# Patient Record
Sex: FEMALE | Race: WHITE | NOT HISPANIC OR LATINO | Employment: STUDENT | URBAN - METROPOLITAN AREA
[De-identification: names, ages, dates, MRNs, and addresses within clinical notes are randomized per-mention and may not be internally consistent; named-entity substitution may affect disease eponyms.]

---
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
## 2019-01-03 ENCOUNTER — HOSPITAL ENCOUNTER (EMERGENCY)
Facility: HOSPITAL | Age: 19
Discharge: HOME | End: 2019-01-04
Attending: EMERGENCY MEDICINE
Payer: COMMERCIAL

## 2019-01-03 ENCOUNTER — APPOINTMENT (EMERGENCY)
Dept: CT IMAGING | Facility: HOSPITAL | Age: 19
End: 2019-01-03
Payer: COMMERCIAL

## 2019-01-03 ENCOUNTER — APPOINTMENT (EMERGENCY)
Dept: RADIOLOGY | Facility: HOSPITAL | Age: 19
End: 2019-01-03
Payer: COMMERCIAL

## 2019-01-03 DIAGNOSIS — K31.84 GASTROPARESIS: Primary | ICD-10-CM

## 2019-01-03 LAB
ALBUMIN SERPL BCP-MCNC: 4 G/DL (ref 3.5–5)
ALP SERPL-CCNC: 80 U/L (ref 46–384)
ALT SERPL W P-5'-P-CCNC: 21 U/L (ref 12–78)
ANION GAP SERPL CALCULATED.3IONS-SCNC: 11 MMOL/L (ref 4–13)
AST SERPL W P-5'-P-CCNC: 13 U/L (ref 5–45)
BASOPHILS # BLD AUTO: 0.05 THOUSANDS/ΜL (ref 0–0.1)
BASOPHILS NFR BLD AUTO: 1 % (ref 0–1)
BILIRUB SERPL-MCNC: 0.2 MG/DL (ref 0.2–1)
BILIRUB UR QL STRIP: NEGATIVE
BUN SERPL-MCNC: 10 MG/DL (ref 5–25)
CALCIUM SERPL-MCNC: 9.6 MG/DL (ref 8.3–10.1)
CHLORIDE SERPL-SCNC: 104 MMOL/L (ref 100–108)
CLARITY UR: CLEAR
CO2 SERPL-SCNC: 25 MMOL/L (ref 21–32)
COLOR UR: YELLOW
CREAT SERPL-MCNC: 0.88 MG/DL (ref 0.6–1.3)
EOSINOPHIL # BLD AUTO: 0.04 THOUSAND/ΜL (ref 0–0.61)
EOSINOPHIL NFR BLD AUTO: 0 % (ref 0–6)
ERYTHROCYTE [DISTWIDTH] IN BLOOD BY AUTOMATED COUNT: 11.8 % (ref 11.6–15.1)
EXT PREG TEST URINE: NEGATIVE
GFR SERPL CREATININE-BSD FRML MDRD: 96 ML/MIN/1.73SQ M
GLUCOSE SERPL-MCNC: 103 MG/DL (ref 65–140)
GLUCOSE UR STRIP-MCNC: NEGATIVE MG/DL
HCT VFR BLD AUTO: 43 % (ref 34.8–46.1)
HGB BLD-MCNC: 14.5 G/DL (ref 11.5–15.4)
HGB UR QL STRIP.AUTO: NEGATIVE
IMM GRANULOCYTES # BLD AUTO: 0.02 THOUSAND/UL (ref 0–0.2)
IMM GRANULOCYTES NFR BLD AUTO: 0 % (ref 0–2)
KETONES UR STRIP-MCNC: NEGATIVE MG/DL
LEUKOCYTE ESTERASE UR QL STRIP: NEGATIVE
LIPASE SERPL-CCNC: 220 U/L (ref 73–393)
LYMPHOCYTES # BLD AUTO: 2.79 THOUSANDS/ΜL (ref 0.6–4.47)
LYMPHOCYTES NFR BLD AUTO: 31 % (ref 14–44)
MCH RBC QN AUTO: 28.7 PG (ref 26.8–34.3)
MCHC RBC AUTO-ENTMCNC: 33.7 G/DL (ref 31.4–37.4)
MCV RBC AUTO: 85 FL (ref 82–98)
MONOCYTES # BLD AUTO: 0.53 THOUSAND/ΜL (ref 0.17–1.22)
MONOCYTES NFR BLD AUTO: 6 % (ref 4–12)
NEUTROPHILS # BLD AUTO: 5.71 THOUSANDS/ΜL (ref 1.85–7.62)
NEUTS SEG NFR BLD AUTO: 62 % (ref 43–75)
NITRITE UR QL STRIP: NEGATIVE
NRBC BLD AUTO-RTO: 0 /100 WBCS
PH UR STRIP.AUTO: 7.5 [PH] (ref 4.5–8)
PLATELET # BLD AUTO: 286 THOUSANDS/UL (ref 149–390)
PMV BLD AUTO: 9.5 FL (ref 8.9–12.7)
POTASSIUM SERPL-SCNC: 3.4 MMOL/L (ref 3.5–5.3)
PROT SERPL-MCNC: 8.3 G/DL (ref 6.4–8.2)
PROT UR STRIP-MCNC: NEGATIVE MG/DL
RBC # BLD AUTO: 5.06 MILLION/UL (ref 3.81–5.12)
SODIUM SERPL-SCNC: 140 MMOL/L (ref 136–145)
SP GR UR STRIP.AUTO: 1.02 (ref 1–1.03)
UROBILINOGEN UR QL STRIP.AUTO: 0.2 E.U./DL
WBC # BLD AUTO: 9.14 THOUSAND/UL (ref 4.31–10.16)

## 2019-01-03 PROCEDURE — 81025 URINE PREGNANCY TEST: CPT | Performed by: PHYSICIAN ASSISTANT

## 2019-01-03 PROCEDURE — 80053 COMPREHEN METABOLIC PANEL: CPT | Performed by: PHYSICIAN ASSISTANT

## 2019-01-03 PROCEDURE — 71046 X-RAY EXAM CHEST 2 VIEWS: CPT

## 2019-01-03 PROCEDURE — 81003 URINALYSIS AUTO W/O SCOPE: CPT

## 2019-01-03 PROCEDURE — 85025 COMPLETE CBC W/AUTO DIFF WBC: CPT | Performed by: PHYSICIAN ASSISTANT

## 2019-01-03 PROCEDURE — 93005 ELECTROCARDIOGRAM TRACING: CPT

## 2019-01-03 PROCEDURE — 99285 EMERGENCY DEPT VISIT HI MDM: CPT

## 2019-01-03 PROCEDURE — 96375 TX/PRO/DX INJ NEW DRUG ADDON: CPT

## 2019-01-03 PROCEDURE — 74176 CT ABD & PELVIS W/O CONTRAST: CPT

## 2019-01-03 PROCEDURE — 83690 ASSAY OF LIPASE: CPT | Performed by: PHYSICIAN ASSISTANT

## 2019-01-03 PROCEDURE — 36415 COLL VENOUS BLD VENIPUNCTURE: CPT | Performed by: PHYSICIAN ASSISTANT

## 2019-01-03 PROCEDURE — 96374 THER/PROPH/DIAG INJ IV PUSH: CPT

## 2019-01-03 RX ORDER — ONDANSETRON 2 MG/ML
4 INJECTION INTRAMUSCULAR; INTRAVENOUS ONCE
Status: COMPLETED | OUTPATIENT
Start: 2019-01-03 | End: 2019-01-03

## 2019-01-03 RX ORDER — KETOROLAC TROMETHAMINE 30 MG/ML
30 INJECTION, SOLUTION INTRAMUSCULAR; INTRAVENOUS ONCE
Status: COMPLETED | OUTPATIENT
Start: 2019-01-03 | End: 2019-01-03

## 2019-01-03 RX ADMIN — KETOROLAC TROMETHAMINE 30 MG: 30 INJECTION, SOLUTION INTRAMUSCULAR at 23:18

## 2019-01-03 RX ADMIN — ONDANSETRON 4 MG: 2 INJECTION INTRAMUSCULAR; INTRAVENOUS at 23:17

## 2019-01-04 ENCOUNTER — TELEPHONE (OUTPATIENT)
Dept: ADMINISTRATIVE | Facility: OTHER | Age: 19
End: 2019-01-04

## 2019-01-04 ENCOUNTER — TELEPHONE (OUTPATIENT)
Dept: GASTROENTEROLOGY | Facility: CLINIC | Age: 19
End: 2019-01-04

## 2019-01-04 VITALS
OXYGEN SATURATION: 98 % | RESPIRATION RATE: 20 BRPM | SYSTOLIC BLOOD PRESSURE: 128 MMHG | TEMPERATURE: 98.6 F | HEART RATE: 84 BPM | DIASTOLIC BLOOD PRESSURE: 83 MMHG

## 2019-01-04 LAB
ATRIAL RATE: 79 BPM
P AXIS: 49 DEGREES
PR INTERVAL: 138 MS
QRS AXIS: 41 DEGREES
QRSD INTERVAL: 92 MS
QT INTERVAL: 364 MS
QTC INTERVAL: 417 MS
T WAVE AXIS: 46 DEGREES
VENTRICULAR RATE: 79 BPM

## 2019-01-04 PROCEDURE — 93010 ELECTROCARDIOGRAM REPORT: CPT | Performed by: INTERNAL MEDICINE

## 2019-01-04 RX ORDER — FAMOTIDINE 20 MG/1
20 TABLET, FILM COATED ORAL ONCE
Status: COMPLETED | OUTPATIENT
Start: 2019-01-04 | End: 2019-01-04

## 2019-01-04 RX ORDER — SUCRALFATE 1 G/1
1 TABLET ORAL 3 TIMES DAILY
Qty: 9 TABLET | Refills: 0 | Status: SHIPPED | OUTPATIENT
Start: 2019-01-04 | End: 2019-01-09 | Stop reason: ALTCHOICE

## 2019-01-04 RX ORDER — OMEPRAZOLE 20 MG/1
20 CAPSULE, DELAYED RELEASE ORAL DAILY
Qty: 10 CAPSULE | Refills: 0 | Status: SHIPPED | OUTPATIENT
Start: 2019-01-04 | End: 2019-01-09 | Stop reason: DRUGHIGH

## 2019-01-04 RX ORDER — SUCRALFATE ORAL 1 G/10ML
1000 SUSPENSION ORAL ONCE
Status: COMPLETED | OUTPATIENT
Start: 2019-01-04 | End: 2019-01-04

## 2019-01-04 RX ORDER — MAGNESIUM HYDROXIDE/ALUMINUM HYDROXICE/SIMETHICONE 120; 1200; 1200 MG/30ML; MG/30ML; MG/30ML
30 SUSPENSION ORAL ONCE
Status: COMPLETED | OUTPATIENT
Start: 2019-01-04 | End: 2019-01-04

## 2019-01-04 RX ADMIN — SUCRALFATE 1000 MG: 1 SUSPENSION ORAL at 01:03

## 2019-01-04 RX ADMIN — LIDOCAINE HYDROCHLORIDE 15 ML: 20 SOLUTION ORAL; TOPICAL at 00:38

## 2019-01-04 RX ADMIN — ALUMINUM HYDROXIDE, MAGNESIUM HYDROXIDE, AND SIMETHICONE 30 ML: 200; 200; 20 SUSPENSION ORAL at 00:38

## 2019-01-04 RX ADMIN — FAMOTIDINE 20 MG: 20 TABLET ORAL at 00:37

## 2019-01-04 NOTE — TELEPHONE ENCOUNTER
Mom called stating pt was in the ER yesterday evening they diagnosed her with Gastroparesis/Gastritis, mom would like to know if you can give her a referral to a GI doctor in [place], [place] while the patient is away at school  She leaves again tomorrow morning for the second semester   Thank you

## 2019-01-04 NOTE — TELEPHONE ENCOUNTER
PT'S DAD CALLED AND WANTED TO FOLLOW UP WITH REQUEST FOR REFERRAL THAT MOM ASKED FOR EARLIER  DAD STATES HE WOULD LIKE TO KNOW A SPECIFIC DOC'S NAME FOR HIS DAUGHTER TO BE SEEN, NOT NECESSARILY A REFERRAL      [phone]

## 2019-01-04 NOTE — TELEPHONE ENCOUNTER
I do not know the adult docs at [facility] at [address]  would be a good person as a resource but I do not know if he would see a 21 yo as new pt  There ar emultiple good docs in that group

## 2019-01-04 NOTE — DISCHARGE INSTRUCTIONS
Gastroparesis, Ambulatory Care   GENERAL INFORMATION:   Gastroparesis  is a condition that causes food to move more slowly than normal from the stomach to the intestines  Gastroparesis is not caused by blockage  Often, the cause may not be known  It may be caused by damage to the vagus nerve  The vagus nerve controls the abigail and relaxing of the stomach and pyloric muscles  These muscles move food through to your small intestines  Common symptoms include:   · Nausea and vomiting    · Feeling full sooner than normal or after eating less than usual    · Abdominal bloating and pain  Seek immediate care for the following symptoms:   · Confusion or trouble thinking clearly    · Faster than usual heartbeat and breathing    · Unable to be awakened  Treatment for gastroparesis  may include medicine to control your nausea and vomiting  You may also need medicines that help food move through your stomach at a more normal rate  Nutrition support may be given as liquid supplements  You may need to eat soft foods, or a tube feeding may be placed past your stomach into your intestine  You may need to see a dietitian for help with a nutrition plan  Gastric electrical stimulation (GES) may be done when symptoms cannot be controlled by other treatments  GES sends electrical pulses to the nerves in your stomach to help food move through to your intestines  It may also help control nausea and vomiting  Manage gastroparesis:  Your healthcare provider may suggest any of the following:  · Chew your food well  Smaller bites of food are easier for your body to digest  You may need to eat several, small, low-fat, low-fiber meals throughout the day  · Avoid raw fruits, vegetables, and whole grains  These can cause you to have undigested food in your stomach  The undigested food can form into a blockage that can become life-threatening  · Drink liquids as directed    Ask your healthcare provider how much liquid to drink each day, and which liquids are best for you  You may also need to drink an oral rehydration solution (ORS)  An ORS has the right amounts of sugar, salt, and minerals in water to replace body fluids  · Do not lie down for 2 hours after your meals  Walking and sitting after meals help with digestion  · Control your blood sugar levels if you have diabetes  High blood sugar levels may make your symptoms worse  Ask your healthcare provider how to control your blood sugar levels  Follow up with your healthcare provider as directed: You may need tests to check if treatment is working  You may need to see a dietitian for help with a nutrition plan  Write down your questions so you remember to ask them during your visits  CARE AGREEMENT:   You have the right to help plan your care  Learn about your health condition and how it may be treated  Discuss treatment options with your caregivers to decide what care you want to receive  You always have the right to refuse treatment  The above information is an  only  It is not intended as medical advice for individual conditions or treatments  Talk to your doctor, nurse or pharmacist before following any medical regimen to see if it is safe and effective for you  © 2014 [address] is for End User's use only and may not be sold, redistributed or otherwise used for commercial purposes  All illustrations and images included in CareNotes® are the copyrighted property of A D A M , Inc  or Abdiaziz Younger

## 2019-01-04 NOTE — ED PROVIDER NOTES
History  Chief Complaint   Patient presents with    Abdominal Pain     pt reports hx gastroparesis and having abdominal pain starting about 7 hours PTA, pt also reports intermittent SOB/CP that has been ongoing "for months", pt reports all symtoms are worse today than before, no meds PTA     23 y/o F presents to ED due to Abdominal Pain  Pt reports Abdominal pain began 7 hours PTA, states this is located on her L side and describes it as dull, with occasional sharp pains  Pt reports no association with food, position, activity, defecation, urination  Pt reports associated nausea, though denies vomiting and states she has been able to eat/drink  Pt also c/o CP, SOB intermittently that has been ongoing for months, states she was previously seen for this issue and was told she had costochondritis, denies any cough, f/c/s, LE swelling, palpitations, lightheadedness, near-syncope, and no specific aggravating/alleviating factors--this pain just resolves on own  Pt reports history of Gastroparesis, states that she has GI, last seen 1 year ago with eval and removal of medications including omeprazole  Pt reports she is sexually active with male partner, states she uses protection, is on BC pills, denies pelvic pain, vaginal bleeding, discharge, pruritis, rashes, flank pain  Patient's last menstrual period was 12/26/2018  Denies new meds, prior abdominal surgery, sick contacts, suspicious food intake, excessive alcohol use, dietary changes, f/c/s, and no other complaints at this time              History provided by:  Patient and parent   used: No    Abdominal Pain   Pain location:  LUQ  Pain quality: dull and sharp    Pain radiates to:  Does not radiate  Pain severity:  Mild  Onset quality:  Gradual  Timing:  Constant  Progression:  Waxing and waning  Chronicity:  New  Context: not alcohol use, not awakening from sleep, not diet changes, not eating, not laxative use, not medication withdrawal, not previous surgeries, not recent illness, not recent sexual activity, not recent travel, not retching, not sick contacts, not suspicious food intake and not trauma    Relieved by:  None tried  Worsened by:  Nothing  Ineffective treatments:  None tried  Associated symptoms: chest pain and nausea    Associated symptoms: no anorexia, no belching, no chills, no constipation, no cough, no diarrhea, no dysuria, no fatigue, no fever, no flatus, no hematemesis, no hematochezia, no hematuria, no melena, no shortness of breath, no sore throat, no vaginal bleeding, no vaginal discharge and no vomiting    Chest pain:     Quality: sharp      Severity:  Mild    Onset quality:  Sudden    Timing:  Intermittent    Progression:  Waxing and waning    Chronicity:  New  Nausea:     Severity:  Mild    Onset quality:  Gradual    Timing:  Constant    Progression:  Unchanged  Risk factors: no alcohol abuse, no aspirin use, not elderly, has not had multiple surgeries, no NSAID use, not obese, not pregnant and no recent hospitalization        Prior to Admission Medications   Prescriptions Last Dose Informant Patient Reported? Taking?    LORazepam (ATIVAN) 1 mg tablet   No Yes   Sig: Take 1 tablet (1 mg total) by mouth daily as needed for anxiety   MARLISSA 0 15-30 MG-MCG per tablet Unknown at Unknown time  No No   Sig: TAKE 1 TABLET BY MOUTH DAILY   albuterol (PROAIR HFA) 90 mcg/act inhaler  Self Yes Yes   Sig: Inhale   levonorgestrel-ethinyl estradiol (NORDETTE) 0 15-30 MG-MCG per tablet  Self No Yes   Sig: Take 1 tablet by mouth daily   sertraline (ZOLOFT) 50 mg tablet   No Yes   Sig: Take half tablet daily for 1 week, then take full tablet daily      Facility-Administered Medications: None       Past Medical History:   Diagnosis Date    Anemia     Anxiety     Depression     Exertional shortness of breath     Resolved 12/23/2015     Gastritis     Gastroparesis     Resolved 10/21/2016     Lyme disease     Syncope     Resolved 11/18/2015 Past Surgical History:   Procedure Laterality Date    ESOPHAGOGASTRODUODENOSCOPY      EGD with bipsy       Family History   Problem Relation Age of Onset    Anxiety disorder Mother     Bipolar disorder Father     Mental illness Father     Anxiety disorder Maternal Grandmother     Hyperlipidemia Maternal Grandmother     Hypertension Maternal Grandmother     Thyroid disease Maternal Grandmother     Schizophrenia Paternal Grandmother     Lung cancer Maternal Grandfather     Kidney disease Paternal Grandfather     Prostate cancer Paternal Grandfather     Substance Abuse Neg Hx      I have reviewed and agree with the history as documented  Social History   Substance Use Topics    Smoking status: Never Smoker    Smokeless tobacco: Never Used    Alcohol use No        Review of Systems   Constitutional: Negative for activity change, appetite change, chills, diaphoresis, fatigue and fever  HENT: Negative for sore throat  Respiratory: Negative for apnea, cough, choking, chest tightness, shortness of breath, wheezing and stridor  Cardiovascular: Positive for chest pain  Negative for palpitations and leg swelling  Gastrointestinal: Positive for abdominal pain and nausea  Negative for anorexia, constipation, diarrhea, flatus, hematemesis, hematochezia, melena and vomiting  Genitourinary: Negative for decreased urine volume, difficulty urinating, dyspareunia, dysuria, flank pain, hematuria, menstrual problem, vaginal bleeding, vaginal discharge and vaginal pain  Musculoskeletal: Negative for arthralgias, joint swelling and myalgias  Skin: Negative for rash and wound  Neurological: Negative for dizziness, light-headedness, numbness and headaches  Physical Exam  Physical Exam   Constitutional: She is oriented to person, place, and time  She appears well-developed and well-nourished  No distress  HENT:   Head: Normocephalic and atraumatic     Right Ear: External ear normal    Left Ear: External ear normal    Mouth/Throat: Oropharynx is clear and moist  No oropharyngeal exudate  Eyes: Pupils are equal, round, and reactive to light  Neck: Normal range of motion  Neck supple  No JVD present  Cardiovascular: Normal rate, regular rhythm, normal heart sounds and intact distal pulses  Exam reveals no gallop and no friction rub  No murmur heard  Pulmonary/Chest: Effort normal and breath sounds normal  No stridor  No respiratory distress  She has no wheezes  She has no rales  She exhibits tenderness (over costochondral junction)  Abdominal: Soft  Bowel sounds are normal  She exhibits no mass  There is no hepatosplenomegaly  There is tenderness in the epigastric area and left upper quadrant  There is no rigidity, no rebound, no guarding, no CVA tenderness, no tenderness at McBurney's point and negative Sandoval's sign  Hernia confirmed negative in the ventral area  Musculoskeletal: Normal range of motion  Lymphadenopathy:     She has no cervical adenopathy  Neurological: She is alert and oriented to person, place, and time  Skin: Skin is warm  Capillary refill takes less than 2 seconds  Nursing note and vitals reviewed        Vital Signs  ED Triage Vitals [01/03/19 2019]   Temperature Pulse Respirations Blood Pressure SpO2   98 6 °F (37 °C) 95 18 140/68 98 %      Temp Source Heart Rate Source Patient Position - Orthostatic VS BP Location FiO2 (%)   Oral Monitor Sitting Left arm --      Pain Score       7           Vitals:    01/04/19 0015 01/04/19 0030 01/04/19 0041 01/04/19 0045   BP:   128/83 128/83   Pulse: 88 88 82 84   Patient Position - Orthostatic VS:   Lying        Visual Acuity      ED Medications  Medications   ketorolac (TORADOL) injection 30 mg (30 mg Intravenous Given 1/3/19 2318)   ondansetron (ZOFRAN) injection 4 mg (4 mg Intravenous Given 1/3/19 2317)   lidocaine viscous (XYLOCAINE) 2 % mucosal solution 15 mL (15 mL Swish & Spit Given 1/4/19 0038) aluminum-magnesium hydroxide-simethicone (MYLANTA) 200-200-20 mg/5 mL oral suspension 30 mL (30 mL Oral Given 1/4/19 0038)   famotidine (PEPCID) tablet 20 mg (20 mg Oral Given 1/4/19 0037)   sucralfate (CARAFATE) oral suspension 1,000 mg (1,000 mg Oral Given 1/4/19 0103)       Diagnostic Studies  Results Reviewed     Procedure Component Value Units Date/Time    CMP [[id number]]  (Abnormal) Collected:  01/03/19 2316    Lab Status:  Final result Specimen:  Blood from Arm, Left Updated:  01/03/19 2344     Sodium 140 mmol/L      Potassium 3 4 (L) mmol/L      Chloride 104 mmol/L      CO2 25 mmol/L      ANION GAP 11 mmol/L      BUN 10 mg/dL      Creatinine 0 88 mg/dL      Glucose 103 mg/dL      Calcium 9 6 mg/dL      AST 13 U/L      ALT 21 U/L      Alkaline Phosphatase 80 U/L      Total Protein 8 3 (H) g/dL      Albumin 4 0 g/dL      Total Bilirubin 0 20 mg/dL      eGFR 96 ml/min/1 73sq m     Narrative:         National Kidney Disease Education Program recommendations are as follows:  GFR calculation is accurate only with a steady state creatinine  Chronic Kidney disease less than 60 ml/min/1 73 sq  meters  Kidney failure less than 15 ml/min/1 73 sq  meters      Lipase [[id number]]  (Normal) Collected:  01/03/19 2316    Lab Status:  Final result Specimen:  Blood from Arm, Left Updated:  01/03/19 2344     Lipase 220 u/L     CBC and differential [[id number]] Collected:  01/03/19 2316    Lab Status:  Final result Specimen:  Blood from Arm, Left Updated:  01/03/19 2327     WBC 9 14 Thousand/uL      RBC 5 06 Million/uL      Hemoglobin 14 5 g/dL      Hematocrit 43 0 %      MCV 85 fL      MCH 28 7 pg      MCHC 33 7 g/dL      RDW 11 8 %      MPV 9 5 fL      Platelets 894 Thousands/uL      nRBC 0 /100 WBCs      Neutrophils Relative 62 %      Immat GRANS % 0 %      Lymphocytes Relative 31 %      Monocytes Relative 6 %      Eosinophils Relative 0 %      Basophils Relative 1 %      Neutrophils Absolute 5 71 Thousands/µL      Immature Grans Absolute 0 02 Thousand/uL      Lymphocytes Absolute 2 79 Thousands/µL      Monocytes Absolute 0 53 Thousand/µL      Eosinophils Absolute 0 04 Thousand/µL      Basophils Absolute 0 05 Thousands/µL     POCT pregnancy, urine [[id number]]  (Normal) Resulted:  01/03/19 2315    Lab Status:  Final result Specimen:  Urine Updated:  01/03/19 2315     EXT PREG TEST UR (Ref: Negative) Negative    ED Urine Macroscopic [[id number]] Collected:  01/03/19 2304    Lab Status:  Final result Specimen:  Urine Updated:  01/03/19 2304     Color, UA Yellow     Clarity, UA Clear     pH, UA 7 5     Leukocytes, UA Negative     Nitrite, UA Negative     Protein, UA Negative mg/dl      Glucose, UA Negative mg/dl      Ketones, UA Negative mg/dl      Urobilinogen, UA 0 2 E U /dl      Bilirubin, UA Negative     Blood, UA Negative     Specific Gravity, UA 1 020    Narrative:       CLIN[facility] RESULT                 XR chest 2 views   Final Result by Dameon Day MD (01/04 9744)      No acute cardiopulmonary disease  Workstation performed: [id number]         CT abdomen pelvis wo contrast   Final Result by Adry Vasquez DO (01/04 0001)      No acute findings               Workstation performed: [id number]                    Procedures  Procedures       Phone Contacts  ED Phone Contact    ED Course  ED Course as of Jan 06 0247   Thu Jan 03, 2019   2353 CT abdomen pelvis wo contrast                               MDM  Number of Diagnoses or Management Options  Gastroparesis: new and requires workup  Diagnosis management comments: 23 y/o F presents to ED due to Abdominal Pain  Pt reports Abdominal pain began 7 hours PTA, states this is located on her L side and describes it as dull, with occasional sharp pains  Pt reports no association with food, position, activity, defecation, urination  Pt reports associated nausea, though denies vomiting and states she has been able to eat/drink   Pt also c/o CP, SOB intermittently that has been ongoing for months, states she was previously seen for this issue and was told she had costochondritis, denies any cough, f/c/s, LE swelling, palpitations, lightheadedness, near-syncope, and no specific aggravating/alleviating factors--this pain just resolves on own  Pt reports history of Gastroparesis, states that she has GI, last seen 1 year ago with eval and removal of medications including omeprazole  Pt reports she is sexually active with male partner, states she uses protection, is on BC pills, denies pelvic pain, vaginal bleeding, discharge, pruritis, rashes, flank pain  Patient's last menstrual period was 12/26/2018  Denies new meds, prior abdominal surgery, sick contacts, suspicious food intake, excessive alcohol use, dietary changes, f/c/s, and no other complaints at this time  VS reviewed and WNL  Exam reveals TTP over costochondral junction, TTP over the LUQ, epigastric region without rebound, guarding, negative reed's negative peritoneal signs and no other significant findings  Will give IVF+analgesia, antiemetic, check labs, CT abd to r/o acute process  Reassess  Labs, CT without significant findings  Discussed course with pt, will attempt GI cocktail, Reassess  Pt improved with GI cocktail, will send home with scripts (recommendations to take Carafate >45 min after PPI) and recommend f/u GI specialist--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 [place], recommended gathering pt records if unable to f/u before return to school and establish care nearby school  Pt sts she understands and agrees with plan, return to ED instructions given regarding new/worsening sxs           Amount and/or Complexity of Data Reviewed  Tests in the radiology section of CPT®: reviewed and ordered  Tests in the medicine section of CPT®: reviewed and ordered  Review and summarize past medical records: yes    Risk of Complications, Morbidity, and/or Mortality  Presenting problems: moderate  Diagnostic procedures: moderate  Management options: moderate    Patient Progress  Patient progress: improved    CritCare Time    Disposition  Final diagnoses:   Gastroparesis     Time reflects when diagnosis was documented in both MDM as applicable and the Disposition within this note     Time User Action Codes Description Comment    1/4/2019 12:52 AM Bennett Diez Add [K31 [id number]] Gastroparesis       ED Disposition     ED Disposition Condition Comment    Discharge  Earnestine Underwood discharge to home/self care  Condition at discharge: Good        Follow-up Information     Follow up With Specialties Details Why Contact Info Additional Information    Yaron Medina MD Family Medicine  As needed [address]       [address] Emergency Department Emergency Medicine  If symptoms worsen [address] [address]  [phone] [facility],  [address], [place], [place], [address]    Your GI Specialist  Schedule an appointment as soon as possible for a visit             Discharge Medication List as of 1/4/2019  1:02 AM      START taking these medications    Details   omeprazole (PriLOSEC) 20 mg delayed release capsule Take 1 capsule (20 mg total) by mouth daily for 10 days, Starting Fri 1/4/2019, Until Mon 1/14/2019, Print      sucralfate (CARAFATE) 1 g tablet Take 1 tablet (1 g total) by mouth 3 (three) times a day for 3 days, Starting Fri 1/4/2019, Until Mon 1/7/2019, Print         CONTINUE these medications which have NOT CHANGED    Details   albuterol (PROAIR HFA) 90 mcg/act inhaler Inhale, Starting Thu 10/15/2015, Historical Med      !! levonorgestrel-ethinyl estradiol (NORDETTE) 0 15-30 MG-MCG per tablet Take 1 tablet by mouth daily, Starting Fri 10/5/2018, Normal      LORazepam (ATIVAN) 1 mg tablet Take 1 tablet (1 mg total) by mouth daily as needed for anxiety, Starting Fri 12/14/2018, Normal      !!  MARLISSA 0 15-30 MG-MCG per tablet TAKE 1 TABLET BY MOUTH DAILY, Starting Thu 12/13/2018, Normal      sertraline (ZOLOFT) 50 mg tablet Take half tablet daily for 1 week, then take full tablet daily, Normal       !! - Potential duplicate medications found  Please discuss with provider  No discharge procedures on file      ED Provider  Electronically Signed by           Isatu Lopez PA-C  01/06/19 6140

## 2019-01-04 NOTE — ED NOTES
Patient resting at bedside with mother   No distress or new complaints noted       [id number] [facility] 28, RN  01/03/19 4596

## 2019-01-04 NOTE — TELEPHONE ENCOUNTER
I know some Pediatric people but they are not likely to see a new pt who is 18  I would suggest she see one of the physicians at the [facility] in [place]

## 2019-01-04 NOTE — TELEPHONE ENCOUNTER
Patient called back and her mom called [facility] gastro to get a referral for a gastro in [place] where her daughter goes to school  Patient returning to [place] tomorrow

## 2019-01-07 ENCOUNTER — TELEPHONE (OUTPATIENT)
Dept: BEHAVIORAL/MENTAL HEALTH CLINIC | Facility: CLINIC | Age: 19
End: 2019-01-07

## 2019-01-07 NOTE — TELEPHONE ENCOUNTER
Kin Aviles,    Patient's father called wanting to get Magdalene Renteria into see Dr Janis robb  I explained he is not in this week and will be back next week  He says that Magdalene Renteria had a crisis over the weekend due to her stomach issues (you can read what is charted at last visit ) She then refused to go back to school which is in [place] and missed her flight  She now says she made a mistake and would like to go back to school  Mother and Father would like for her to be seen sometime this week before she goes back  Would you be willing to see this patient in Dr Pavan Mcallister absence?

## 2019-01-07 NOTE — TELEPHONE ENCOUNTER
Yes, do you think she can come in today? Preferably in the morning, before noon? Please let me know! Thank you so much!

## 2019-01-08 ENCOUNTER — TELEPHONE (OUTPATIENT)
Dept: PSYCHIATRY | Facility: CLINIC | Age: 19
End: 2019-01-08

## 2019-01-08 ENCOUNTER — OFFICE VISIT (OUTPATIENT)
Dept: PSYCHIATRY | Facility: CLINIC | Age: 19
End: 2019-01-08
Payer: COMMERCIAL

## 2019-01-08 DIAGNOSIS — F32.A DEPRESSIVE DISORDER: ICD-10-CM

## 2019-01-08 DIAGNOSIS — F41.9 ANXIETY DISORDER, UNSPECIFIED TYPE: Primary | ICD-10-CM

## 2019-01-08 PROCEDURE — 3725F SCREEN DEPRESSION PERFORMED: CPT | Performed by: PHYSICIAN ASSISTANT

## 2019-01-08 PROCEDURE — 99214 OFFICE O/P EST MOD 30 MIN: CPT | Performed by: PHYSICIAN ASSISTANT

## 2019-01-08 NOTE — PSYCH
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), no significant PPH, no past psychiatric hospitalizations, no past suicide attempts, no h/o self-injurious behaviors, no h/o physical altercations, PMH significant for h/o lyme disease, gastritis, h/o esophageal candidiasis, no active substance use, presents to [place] [address] outpatient clinic on referral from gastroenterologist for psychosomatic contributions to chronic abdominal pain with patient reporting "I need help controlling the pain" with father reporting "she's had stress and anxiety "     On problem-focused interview:  1  Somatic Symptoms- Patient reports that she was in the Emergency Room last week due to stomach pain  She was started on a trial of Prilosec, and is following up with GI tomorrow       2  Mood/Anxiety- Patient reports that life has been "really stressful " She states that last week, she had a severe flare of her gastroparesis, and she went to the Emergency Room  This recent stomach flare "almost completely ruined" her desire to return back to college in [place]  She reports that when it was time for her to go to the airport for her flight back to school, she had a panic attack and began crying uncontrollably  As a result, her father scheduled an emergency appointment for her  She reports that the reason she wants to stay home right now is so that she can be close to her GI doctors  She says that when she was first diagnosed with her stomach conditions, she missed 7 5 months of high school  As a result, she is always fearful of leaving her doctors, in the event that another flare will start       She states that her friends are now back at college and she misses them, and she is currently planning on returning this weekend  She says that if she were to establish care with a GI doctor in [place], she would have to be set up with [facility]  The providers are reportedly 45 minutes from campus and she does not have a car  She is currently studying medical microbiology  She reports that she recently changed into this major last semester, and it is a better fit for her overall  She has aspirations to work in research, and she is interested in vaccines and infectious diseases  She reports that she lives alone in a studio apartment at school and it becomes isolating  She spent most of her time this past semester studying alone  She plans on joining a sorority this upcoming spring semester so that she can "make herself" be social  She states that she "really needs female friends" at college, as "all" of her female friends are located back home  She states that her friends at school are "all frat guys " She states that she does consistently go out to various social events, at least twice per month  She likes attending school football games and parties  She feels safe at school  She occasionally drinks alcohol, and denies drinking to the point of blacking out  She denies smoking cigarettes or marijuana  She states that it is hard for her to fall asleep, but once she falls asleep, she stays deeply asleep for eight or more hours  She says it takes 90 minutes to fall asleep each night  She states that she has racing thoughts, and her "body won't calm down at night," which she attributes to her stomach ailments  She states that she has tried melatonin, without any improvement  She states her appetite had been "fine" prior to this recent flare up of her gastroparesis  She states that she has had difficulty concentrating in her classes, and it became worse at the beginning of the fall semester   She states that she has had to leave class out of frustration that she cannot concentrate  She states that she has trouble focusing because her mind is occupied by several things at one time  She states that the first week of taking the Zoloft was very rough, where she felt like "a zombie" and "not present " She states that shortly after starting it, she had her stomach flare up, and it has been hard to assess her level of response to it  She feels she is getting better, though  She states that her mood is more "leveled " She states that she often gets "depressed at night " She states that at night when she has these depressive episodes, she often has feelings of worthlessness and feeling as though she is a burden  She states that she has had passive feelings of suicidality and wanting to harm herself, however she states that she has never had any active thoughts or intent of wanting to harm herself or kill herself  She states that her anxiety was "pretty good" while she was home over break  She states that she started to "completely worry over everything" over the week leading up to going back to school  She admits to having anxiety anticipating the spring semester  She states that she "likes the Zoloft a lot more" than when she was taking Prozac  She does feel that it is helping her, but she still experiences depressive symptoms  She states that she gets "overwhelmed a lot," but she is able to calm herself down before she gets into a full blown panic attack  She states that she has been taking her Ativan at most once weekly, and it helps improve her anxiety symptoms  On psychiatric review of systems, she denies any auditory or visual hallucinations  She does admit to episodes of racing thoughts and elevated mood and motivation, however she states that these episodes last "half a day" and then she "crashes " She admits to fleeting passive suicidal ideation, but denies active suicidal ideation with intent or plan                  Review Of Systems: Constitutional Negative   ENT Negative   Cardiovascular Negative   Respiratory Negative   Gastrointestinal As Noted in HPI and Abdominal Pain   Genitourinary Negative   Musculoskeletal Negative   Integumentary Negative   Neurological Negative   Endocrine Negative     Past Medical History:   Patient Active Problem List   Diagnosis    Anxiety disorder    Headache, migraine    Irregular menses    Rapid or irregular heartbeat    Functional dyspepsia    Fatigue    Costochondritis    Depressive disorder              Allergies: Allergies   Allergen Reactions    Pineapple Anaphylaxis    Penicillins          Past Surgical History:   Past Surgical History:   Procedure Laterality Date    ESOPHAGOGASTRODUODENOSCOPY      EGD with bipsy         Past Psychiatric History:   No significant PPH, no past psychiatric hospitalizations, no past suicide attempts, no h/o self-injurious behaviors, no h/o physical altercations       Past Med Trials: Fluoxetine up to 40 mg daily (ineffective)       Family Psychiatric History:   Father- Bipolar II d/o (on Prozac)  Pat  Great Grandmother- Schizophrenia  Mother- Panic Attacks (Xanax, previously on Zoloft)  Mat  Grandmother- Anxiety  Maternal Side- Chronic abdominal pain     No FH of suicide       Social History:   Domiciled with parents, brother (15 y/o) in [place]  Mother employed in [employer], father employed as pharmaceutical consultant  Reports infrequent caffeine use  No active substance use  Substance Abuse History:   Alcohol- drinking occasionally every other weekend, a couple of drinks      Traumatic History: Denies any h/o physical or sexual abuse  The following portions of the patient's history were reviewed and updated as appropriate: allergies, current medications, past family history, past medical history, past social history, past surgical history and problem list     Objective: There were no vitals filed for this visit        Weight (last 2 days)     None Mental status:  Appearance sitting comfortably in chair, dressed in casual clothing, adequate hygiene and grooming, cooperative with interview, good eye contact, well related, appeared mildly anxious   Mood "more leveled"   Affect Appears mildly constricted in depressed range, stable, mood-congruent   Speech Normal rate, rhythm, and volume   Thought Processes Linear and goal directed   Associations intact associations   Hallucinations Denies any auditory or visual hallucinations and Denies CAH to harm self or others  Thought Content Fleeting passive suicidal ideation, No active suicidal ideation, intent, or plan, help-seeking and Future-oriented   Orientation Oriented to person, place, time, and situation   Recent and Remote Memory grossly intact   Attention Span concentration intact   Intellect Appears to be of Average Intelligence   Insight Insight intact   Judgement judgment was intact   Muscle Strength Muscle strength and tone were normal   Language Within normal limits   Fund of Knowledge Age appropriate   Pain none     Assessment:       Diagnoses and all orders for this visit:    Anxiety disorder, unspecified type  -     sertraline (ZOLOFT) 50 mg tablet; Take One and One-Half tablets by mouth daily  (Total daily dose is 75 mg)  Depressive disorder  -     sertraline (ZOLOFT) 50 mg tablet; Take One and One-Half tablets by mouth daily  (Total daily dose is 75 mg)              Treatment Recommendations:    19-4 y/o Female, domiciled with parents, brother (15 y/o) in [facility], 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), no significant PPH, no past psychiatric hospitalizations, no past suicide attempts, no h/o self-injurious behaviors, no h/o physical altercations, PMH significant for h/o lyme disease, gastritis, h/o esophageal candidiasis, no active substance use, presents to [place] [address] outpatient clinic on referral from gastroenterologist for psychosomatic contributions to chronic abdominal pain with patient reporting "I need help controlling the pain" with father reporting "she's had stress and anxiety "       On assessment today, patient with continued worsening of depressive and anxiety symptoms since her fall semester, currently in moderate-severe range of depression and moderate anxiety, having abdominal somatic symptoms secondary to stress, using Ativan once per week, in psychosocial context of in sophomore year of college, living far away from home, living alone in own studio apartment off-campus this year  Patient stated that she has felt improvement since starting the Zoloft  Recommended increasing Zoloft from 50 mg to 75 mg  Discussed trying melatonin for her difficulty initiating sleeping  Discussed the importance of setting up consistent and regular psychotherapy when she returns to school  Recommended that she visit the [facility] when she returns for the spring semester  Patient will follow up at her upcoming spring break, but was instructed to call if she experiences any worsening depression or increase in suicidal thoughts  On suicide risk assessment: Fleeting passive SI, no current active SI, intent, or plan  Patient endorses frequent feelings of "worthlessness" and feeling as though she is a burden  She states that at night, she has passive suicidal ideation, but adamantly denies any active suicidal thoughts or desire or intent to harm herself or kill herself  Patient is help-seeking and future-oriented  She is looking forward to the spring semester and joining a sorority  Patient states that she currently feels safe  She states that if she feels any worsening signs of depression or increased thoughts of suicide, she will call her parents and report to the nearest Emergency Room   Based on these criteria, patient is not an imminent risk of harm to self or others, and outpatient level of care is deemed appropriate at this current time  Diagnosis: 1  Unspecified anxiety disorder, r/o generalized anxiety disorder, 2  r/o unspecified somatic symptom disorder  Plan:  1) Depression, Anxiety, Chronic Abdominal Pain- Patient displayed continued severe depressive and anxiety symptoms and admitted to fleeting passive suicidal ideation  Recommended increasing Zoloft from 50 mg to 75 mg daily to target mood and anxiety symptoms  Will continue Ativan up to 1 mg daily prn severe anxiety or panic attacks  PHQ-A score of 21, severe depressive symptoms, (1/8/2019)   JADEN-7 score of 17, severe anxiety symptoms (1/8/2019)  Recommended she establish care with regular and consistent Psychotherapy once she returns to school  Discussed different options and recommended that she consider speaking to a counselor at school when she returns  2) Medical- Continue to f/u with GI doctors for management of abdominal pain  F/u with PCP for on-going medical issues  3) F/u with Psychiatrist on next college break   Advised to call this Provider for further assessment of symptoms if no improvement in about 6-8 weeks  4) Patient endorsed fleeting passive suicidal ideation but adamantly denied any active intent or plan to harm herself or kill herself  Instructed patient to call her parents and report to the Emergency Room if she feels unsafe or experiences any worsening thoughts of wanting to harm herself  Patient agreed and understood  Risks, Benefits And Possible Side Effects Of Medications:  Risks, benefits, and possible side effects of medications explained to patient and family, they verbalize understanding, Reviewed risks/benefits and side effects of antidepressant medications including black box warning on antidepressants, patient and family verbalize understanding  and Reviewed risks/benefits and side effects of benzodiazepine medications including risks of dependence, patient and family verbalize understanding      Controlled Medication Discussion: Discussed with patient the risks of sedation, respiratory depression, impairment of ability to drive and potential for abuse and addiction related to treatment with benzodiazepine medications  The patient understands risk of treatment with benzodiazepine medications, agrees to not drive if feels impaired and agrees to take medications as prescribed   and The patient has been filling controlled prescriptions on time as prescribed to [facility]

## 2019-01-08 NOTE — PSYCH
TREATMENT PLAN (Medication Management Only)        [employer] [facility] [employer]    Name and Date of Birth:  Julia Nolasco 22 y o  2000  Date of Treatment Plan: January 8, 2019  Diagnosis/Diagnoses:    1  Anxiety disorder, unspecified type    2  Depressive disorder      Strengths/Personal Resources for Self-Care: Meditation, exercise, journaling/documenting  Area/Areas of need (in own words): Controlling and identifying triggers to depressive episodes  1  Long Term Goal: Reduce Anxiety and Depression symptoms   Target Date: 1 year - 1/8/2020  Person/Persons responsible for completion of goal: Grace  2  Short Term Objective (s) - How will we reach this goal?:   A  Provider new recommended medication/dosage changes and/or continue medication(s): continue current medications as prescribed (Zoloft)  B   Getting involved in social activities at school  C  Try to see a counselor at school when anxiety or depressive symptoms are increasing  Target Date: 3 months - 4/8/2019  Person/Persons Responsible for Completion of Goal: Grace  Progress Towards Goals: continuing treatment  Treatment Modality: medication management every 3 months  Review due 90 to 120 days from date of this plan: 3 months - 4/8/2019  Expected length of service: ongoing treatment     My Physician/PA/NP and I have developed this plan together and I agree to work on the goals and objectives  I understand the treatment goals that were developed for my treatment      Signature:       Date and time:      Signature of parent/guardian if under age of 15 years: Date and time:      Signature of provider:      Date and time:      Signature of Supervising Physician:    Date and time: 1/8/2019      Traci Whaley PA-C

## 2019-01-09 ENCOUNTER — OFFICE VISIT (OUTPATIENT)
Dept: GASTROENTEROLOGY | Facility: CLINIC | Age: 19
End: 2019-01-09
Payer: COMMERCIAL

## 2019-01-09 VITALS
BODY MASS INDEX: 21.74 KG/M2 | HEART RATE: 78 BPM | HEIGHT: 65 IN | DIASTOLIC BLOOD PRESSURE: 68 MMHG | SYSTOLIC BLOOD PRESSURE: 108 MMHG | RESPIRATION RATE: 12 BRPM | WEIGHT: 130.51 LBS | TEMPERATURE: 98.7 F

## 2019-01-09 DIAGNOSIS — G43.909 MIGRAINE WITHOUT STATUS MIGRAINOSUS, NOT INTRACTABLE, UNSPECIFIED MIGRAINE TYPE: ICD-10-CM

## 2019-01-09 DIAGNOSIS — K30 FUNCTIONAL DYSPEPSIA: Primary | ICD-10-CM

## 2019-01-09 PROCEDURE — 99214 OFFICE O/P EST MOD 30 MIN: CPT | Performed by: PEDIATRICS

## 2019-01-09 RX ORDER — OMEPRAZOLE 40 MG/1
40 CAPSULE, DELAYED RELEASE ORAL DAILY
Qty: 30 CAPSULE | Refills: 2 | Status: SHIPPED | OUTPATIENT
Start: 2019-01-09 | End: 2019-04-24 | Stop reason: SDUPTHER

## 2019-01-09 NOTE — PATIENT INSTRUCTIONS
As we discussed today, I would like to increase the omeprazole dosage to 40 mg a day  I have sent a prescription to the pharmacy that I would like you to  prior to her leaving for [facility] tomorrow  Please continue that dosage until you return home for spring break  At that time, we will likely begin to taper the dosage  If this dosage is not effective or if symptoms intensify, please do not hesitate to give us a call

## 2019-01-09 NOTE — PROGRESS NOTES
Assessment/Plan:    No problem-specific Assessment & Plan notes found for this encounter  Diagnoses and all orders for this visit:    Functional dyspepsia  -     omeprazole (PriLOSEC) 40 MG capsule; Take 1 capsule (40 mg total) by mouth daily    Migraine without status migrainosus, not intractable, unspecified migraine type        I do concur with the emergency department staff that she may have gastritis  I have recommended that we increase the omeprazole dosage to 40 mg daily and that we continue that dose until she returns home for spring break  At that point if she is doing well, we plan to taper the medication and later in the summer taper to a p r n  schedule  If there are difficulties, I have asked family to give us call  Subjective:      Patient ID: Mendel Hoh is a 22 y o  female  Miles Atrah was seen today in follow-up in the GI office regarding abdominal pain  As you know been following for for dyspepsia and migraine for an extended interval   She had a particularly difficult time last week and was started on omeprazole and sucralfate by the emergency department staff  Since beginning those medications, her symptoms have markedly improved  She has since discontinued sucralfate and is feeling much better  She is planning on returning to [place] to resume her schooling tomorrow  Abdominal Pain   Associated symptoms include headaches and vomiting  Pertinent negatives include no arthralgias, constipation, diarrhea, dysuria or nausea  The following portions of the patient's history were reviewed and updated as appropriate: allergies, current medications, past family history, past medical history, past social history, past surgical history and problem list     Review of Systems   Constitutional: Negative for activity change, appetite change and unexpected weight change  HENT: Negative for congestion, mouth sores, rhinorrhea and trouble swallowing      Eyes: Negative for photophobia and visual disturbance  Respiratory: Negative for apnea, cough and wheezing  Cardiovascular: Negative for chest pain and palpitations  Gastrointestinal: Positive for abdominal pain and vomiting  Negative for abdominal distention, anal bleeding, blood in stool, constipation, diarrhea, nausea and rectal pain  Genitourinary: Negative for dysuria, menstrual problem, vaginal bleeding and vaginal discharge  Musculoskeletal: Negative for arthralgias and joint swelling  Skin: Negative for color change  Allergic/Immunologic: Negative for environmental allergies and food allergies  Neurological: Positive for headaches  Negative for seizures  Hematological: Negative for adenopathy  Psychiatric/Behavioral: Negative for behavioral problems and sleep disturbance  Objective:      /68 (BP Location: Left arm, Patient Position: Sitting, Cuff Size: Adult)   Pulse 78   Temp 98 7 °F (37 1 °C) (Temporal)   Resp 12   Ht 5' 4 88" (1 648 m)   Wt 59 2 kg (130 lb 8 2 oz)   LMP 12/26/2018   BMI 21 80 kg/m²          Physical Exam   Constitutional: She appears well-developed and well-nourished  Cardiovascular: Normal rate, regular rhythm and normal heart sounds  Pulmonary/Chest: Effort normal and breath sounds normal    Abdominal: Soft   Bowel sounds are normal

## 2019-03-07 ENCOUNTER — OFFICE VISIT (OUTPATIENT)
Dept: GASTROENTEROLOGY | Facility: CLINIC | Age: 19
End: 2019-03-07
Payer: COMMERCIAL

## 2019-03-07 VITALS
SYSTOLIC BLOOD PRESSURE: 108 MMHG | HEIGHT: 65 IN | WEIGHT: 126.54 LBS | TEMPERATURE: 97.5 F | DIASTOLIC BLOOD PRESSURE: 70 MMHG | BODY MASS INDEX: 21.08 KG/M2

## 2019-03-07 DIAGNOSIS — K30 FUNCTIONAL DYSPEPSIA: Primary | ICD-10-CM

## 2019-03-07 DIAGNOSIS — G43.909 MIGRAINE WITHOUT STATUS MIGRAINOSUS, NOT INTRACTABLE, UNSPECIFIED MIGRAINE TYPE: ICD-10-CM

## 2019-03-07 PROCEDURE — 99213 OFFICE O/P EST LOW 20 MIN: CPT | Performed by: PEDIATRICS

## 2019-03-07 NOTE — PATIENT INSTRUCTIONS
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

## 2019-03-07 NOTE — PROGRESS NOTES
Assessment/Plan:    No problem-specific Assessment & Plan notes found for this encounter  Diagnoses and all orders for this visit:    Functional dyspepsia    Migraine without status migrainosus, not intractable, unspecified migraine type        I am pleased that Tenzin Desir is doing so well  We plan to taper acid suppression after the semester has been completed  If we are not successful, we plan to perform an EGD to make sure that we are not missing any mucosal lesions  Follow-up is scheduled for May  Subjective:      Patient ID: Frederic Garcia is a 22 y o  female  Tenzin Desir was seen today in follow-up in the GI office regarding dyspepsia  She also has migraines  Since her last visit, she has been using omeprazole on a daily basis and with the exception of times where she misses medication doses for has dietary indiscretions, she does extremely well  She has maintained a healthy weight, has kept up with her studies and 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  The following portions of the patient's history were reviewed and updated as appropriate: allergies, current medications, past family history, past medical history, past social history, past surgical history and problem list     Review of Systems   Constitutional: Negative for activity change, appetite change and unexpected weight change  HENT: Negative for congestion, mouth sores, rhinorrhea and trouble swallowing  Eyes: Negative for photophobia and visual disturbance  Respiratory: Negative for apnea, cough and wheezing  Cardiovascular: Negative for chest pain and palpitations  Gastrointestinal: Negative for abdominal distention, abdominal pain, anal bleeding, blood in stool, constipation, diarrhea, nausea, rectal pain and vomiting  Genitourinary: Negative for dysuria, menstrual problem, vaginal bleeding and vaginal discharge  Musculoskeletal: Negative for arthralgias and joint swelling  Skin: Negative for color change  Allergic/Immunologic: Negative for environmental allergies and food allergies  Neurological: Negative for seizures and headaches  Hematological: Negative for adenopathy  Psychiatric/Behavioral: Negative for behavioral problems and sleep disturbance  Objective:      /70   Temp 97 5 °F (36 4 °C)   Ht 5' 4 65" (1 642 m)   Wt 57 4 kg (126 lb 8 7 oz)   BMI 21 29 kg/m²          Physical Exam   Constitutional: She appears well-developed and well-nourished  Cardiovascular: Normal rate, regular rhythm and normal heart sounds  No murmur heard  Pulmonary/Chest: Effort normal and breath sounds normal    Abdominal: Soft   Bowel sounds are normal

## 2019-04-24 DIAGNOSIS — K30 FUNCTIONAL DYSPEPSIA: ICD-10-CM

## 2019-04-24 RX ORDER — OMEPRAZOLE 40 MG/1
40 CAPSULE, DELAYED RELEASE ORAL DAILY
Qty: 30 CAPSULE | Refills: 2 | Status: SHIPPED | OUTPATIENT
Start: 2019-04-24 | End: 2019-07-19 | Stop reason: SDUPTHER

## 2019-05-24 ENCOUNTER — DOCUMENTATION (OUTPATIENT)
Dept: PSYCHIATRY | Facility: CLINIC | Age: 19
End: 2019-05-24

## 2019-05-28 ENCOUNTER — OFFICE VISIT (OUTPATIENT)
Dept: PSYCHIATRY | Facility: CLINIC | Age: 19
End: 2019-05-28
Payer: COMMERCIAL

## 2019-05-28 DIAGNOSIS — F41.9 ANXIETY DISORDER, UNSPECIFIED TYPE: ICD-10-CM

## 2019-05-28 DIAGNOSIS — F32.A DEPRESSIVE DISORDER: Primary | ICD-10-CM

## 2019-05-28 PROCEDURE — 90833 PSYTX W PT W E/M 30 MIN: CPT | Performed by: STUDENT IN AN ORGANIZED HEALTH CARE EDUCATION/TRAINING PROGRAM

## 2019-05-28 PROCEDURE — 99214 OFFICE O/P EST MOD 30 MIN: CPT | Performed by: STUDENT IN AN ORGANIZED HEALTH CARE EDUCATION/TRAINING PROGRAM

## 2019-05-28 RX ORDER — SERTRALINE HYDROCHLORIDE 100 MG/1
100 TABLET, FILM COATED ORAL DAILY
Qty: 90 TABLET | Refills: 0 | Status: SHIPPED | OUTPATIENT
Start: 2019-05-28 | End: 2019-06-27 | Stop reason: SDUPTHER

## 2019-05-29 VITALS — WEIGHT: 126 LBS | HEIGHT: 64 IN | BODY MASS INDEX: 21.51 KG/M2

## 2019-05-31 ENCOUNTER — OFFICE VISIT (OUTPATIENT)
Dept: GASTROENTEROLOGY | Facility: CLINIC | Age: 19
End: 2019-05-31
Payer: COMMERCIAL

## 2019-05-31 VITALS
DIASTOLIC BLOOD PRESSURE: 68 MMHG | HEIGHT: 64 IN | TEMPERATURE: 97.6 F | WEIGHT: 125.44 LBS | SYSTOLIC BLOOD PRESSURE: 98 MMHG | BODY MASS INDEX: 21.42 KG/M2

## 2019-05-31 DIAGNOSIS — R68.81 EARLY SATIETY: ICD-10-CM

## 2019-05-31 DIAGNOSIS — R10.84 GENERALIZED POSTPRANDIAL ABDOMINAL PAIN: Primary | ICD-10-CM

## 2019-05-31 DIAGNOSIS — K30 FUNCTIONAL DYSPEPSIA: ICD-10-CM

## 2019-05-31 PROCEDURE — 99214 OFFICE O/P EST MOD 30 MIN: CPT | Performed by: NURSE PRACTITIONER

## 2019-06-03 ENCOUNTER — TELEPHONE (OUTPATIENT)
Dept: GASTROENTEROLOGY | Facility: CLINIC | Age: 19
End: 2019-06-03

## 2019-06-04 ENCOUNTER — ANESTHESIA (OUTPATIENT)
Dept: GASTROENTEROLOGY | Facility: HOSPITAL | Age: 19
End: 2019-06-04

## 2019-06-04 ENCOUNTER — TELEPHONE (OUTPATIENT)
Dept: OTHER | Facility: OTHER | Age: 19
End: 2019-06-04

## 2019-06-04 ENCOUNTER — ANESTHESIA EVENT (OUTPATIENT)
Dept: GASTROENTEROLOGY | Facility: HOSPITAL | Age: 19
End: 2019-06-04

## 2019-06-04 ENCOUNTER — HOSPITAL ENCOUNTER (OUTPATIENT)
Dept: GASTROENTEROLOGY | Facility: CLINIC | Age: 19
Setting detail: OUTPATIENT SURGERY
Discharge: HOME | End: 2019-06-04
Attending: PEDIATRICS | Admitting: PEDIATRICS
Payer: COMMERCIAL

## 2019-06-04 VITALS
HEIGHT: 64 IN | DIASTOLIC BLOOD PRESSURE: 76 MMHG | OXYGEN SATURATION: 100 % | SYSTOLIC BLOOD PRESSURE: 111 MMHG | HEART RATE: 87 BPM | BODY MASS INDEX: 21.34 KG/M2 | RESPIRATION RATE: 18 BRPM | WEIGHT: 125 LBS | TEMPERATURE: 97.6 F

## 2019-06-04 DIAGNOSIS — G43.909 MIGRAINE WITHOUT STATUS MIGRAINOSUS, NOT INTRACTABLE, UNSPECIFIED MIGRAINE TYPE: ICD-10-CM

## 2019-06-04 DIAGNOSIS — K30 FUNCTIONAL DYSPEPSIA: ICD-10-CM

## 2019-06-04 DIAGNOSIS — R10.84 GENERALIZED POSTPRANDIAL ABDOMINAL PAIN: ICD-10-CM

## 2019-06-04 LAB — EXT PREGNANCY TEST URINE: NEGATIVE

## 2019-06-04 PROCEDURE — 43239 EGD BIOPSY SINGLE/MULTIPLE: CPT | Performed by: PEDIATRICS

## 2019-06-04 PROCEDURE — 88305 TISSUE EXAM BY PATHOLOGIST: CPT | Performed by: PATHOLOGY

## 2019-06-04 PROCEDURE — 81025 URINE PREGNANCY TEST: CPT | Performed by: ANESTHESIOLOGY

## 2019-06-04 RX ORDER — PROPOFOL 10 MG/ML
INJECTION, EMULSION INTRAVENOUS CONTINUOUS PRN
Status: DISCONTINUED | OUTPATIENT
Start: 2019-06-04 | End: 2019-06-04 | Stop reason: SURG

## 2019-06-04 RX ORDER — SODIUM CHLORIDE 9 MG/ML
125 INJECTION, SOLUTION INTRAVENOUS CONTINUOUS
Status: DISCONTINUED | OUTPATIENT
Start: 2019-06-04 | End: 2019-06-08 | Stop reason: HOSPADM

## 2019-06-04 RX ORDER — PROPOFOL 10 MG/ML
INJECTION, EMULSION INTRAVENOUS AS NEEDED
Status: DISCONTINUED | OUTPATIENT
Start: 2019-06-04 | End: 2019-06-04 | Stop reason: SURG

## 2019-06-04 RX ORDER — SODIUM CHLORIDE 9 MG/ML
20 INJECTION, SOLUTION INTRAVENOUS CONTINUOUS
Status: CANCELLED | OUTPATIENT
Start: 2019-06-04

## 2019-06-04 RX ORDER — LIDOCAINE HYDROCHLORIDE 10 MG/ML
INJECTION, SOLUTION INFILTRATION; PERINEURAL AS NEEDED
Status: DISCONTINUED | OUTPATIENT
Start: 2019-06-04 | End: 2019-06-04 | Stop reason: SURG

## 2019-06-04 RX ADMIN — LIDOCAINE HYDROCHLORIDE 50 MG: 10 INJECTION, SOLUTION INFILTRATION; PERINEURAL at 12:13

## 2019-06-04 RX ADMIN — PROPOFOL 150 MG: 10 INJECTION, EMULSION INTRAVENOUS at 12:13

## 2019-06-04 RX ADMIN — PROPOFOL 20 MG: 10 INJECTION, EMULSION INTRAVENOUS at 12:18

## 2019-06-04 RX ADMIN — PROPOFOL 250 MCG/KG/MIN: 10 INJECTION, EMULSION INTRAVENOUS at 12:14

## 2019-06-04 RX ADMIN — SODIUM CHLORIDE 125 ML/HR: 0.9 INJECTION, SOLUTION INTRAVENOUS at 11:24

## 2019-06-05 ENCOUNTER — TELEPHONE (OUTPATIENT)
Dept: GASTROENTEROLOGY | Facility: CLINIC | Age: 19
End: 2019-06-05

## 2019-06-13 ENCOUNTER — OFFICE VISIT (OUTPATIENT)
Dept: GASTROENTEROLOGY | Facility: CLINIC | Age: 19
End: 2019-06-13
Payer: COMMERCIAL

## 2019-06-13 VITALS
SYSTOLIC BLOOD PRESSURE: 106 MMHG | DIASTOLIC BLOOD PRESSURE: 76 MMHG | HEIGHT: 65 IN | TEMPERATURE: 98.1 F | WEIGHT: 125.44 LBS | BODY MASS INDEX: 20.9 KG/M2

## 2019-06-13 DIAGNOSIS — R68.81 EARLY SATIETY: ICD-10-CM

## 2019-06-13 DIAGNOSIS — K30 FUNCTIONAL DYSPEPSIA: Primary | ICD-10-CM

## 2019-06-13 DIAGNOSIS — R63.0 POOR APPETITE: ICD-10-CM

## 2019-06-13 PROCEDURE — 99214 OFFICE O/P EST MOD 30 MIN: CPT | Performed by: NURSE PRACTITIONER

## 2019-06-13 RX ORDER — CYPROHEPTADINE HYDROCHLORIDE 4 MG/1
4 TABLET ORAL
Qty: 30 TABLET | Refills: 2 | Status: SHIPPED | OUTPATIENT
Start: 2019-06-13 | End: 2019-06-27

## 2019-06-17 ENCOUNTER — TELEPHONE (OUTPATIENT)
Dept: GASTROENTEROLOGY | Facility: CLINIC | Age: 19
End: 2019-06-17

## 2019-06-27 ENCOUNTER — OFFICE VISIT (OUTPATIENT)
Dept: PSYCHIATRY | Facility: CLINIC | Age: 19
End: 2019-06-27
Payer: COMMERCIAL

## 2019-06-27 VITALS
DIASTOLIC BLOOD PRESSURE: 80 MMHG | HEART RATE: 86 BPM | SYSTOLIC BLOOD PRESSURE: 117 MMHG | WEIGHT: 127.2 LBS | BODY MASS INDEX: 21.22 KG/M2

## 2019-06-27 DIAGNOSIS — F41.9 ANXIETY DISORDER, UNSPECIFIED TYPE: Primary | ICD-10-CM

## 2019-06-27 DIAGNOSIS — F32.A DEPRESSIVE DISORDER: ICD-10-CM

## 2019-06-27 PROCEDURE — 99214 OFFICE O/P EST MOD 30 MIN: CPT | Performed by: STUDENT IN AN ORGANIZED HEALTH CARE EDUCATION/TRAINING PROGRAM

## 2019-06-27 PROCEDURE — 3725F SCREEN DEPRESSION PERFORMED: CPT | Performed by: STUDENT IN AN ORGANIZED HEALTH CARE EDUCATION/TRAINING PROGRAM

## 2019-06-27 PROCEDURE — 90833 PSYTX W PT W E/M 30 MIN: CPT | Performed by: STUDENT IN AN ORGANIZED HEALTH CARE EDUCATION/TRAINING PROGRAM

## 2019-06-27 RX ORDER — QUETIAPINE FUMARATE 50 MG/1
50 TABLET, EXTENDED RELEASE ORAL
Qty: 30 TABLET | Refills: 1 | Status: SHIPPED | OUTPATIENT
Start: 2019-06-27 | End: 2019-07-15 | Stop reason: SDUPTHER

## 2019-06-27 RX ORDER — SERTRALINE HYDROCHLORIDE 100 MG/1
100 TABLET, FILM COATED ORAL DAILY
Qty: 90 TABLET | Refills: 0 | Status: SHIPPED | OUTPATIENT
Start: 2019-06-27 | End: 2019-10-26 | Stop reason: SDUPTHER

## 2019-07-03 ENCOUNTER — HOSPITAL ENCOUNTER (OUTPATIENT)
Dept: RADIOLOGY | Facility: HOSPITAL | Age: 19
Discharge: HOME | End: 2019-07-03
Payer: COMMERCIAL

## 2019-07-03 ENCOUNTER — TRANSCRIBE ORDERS (OUTPATIENT)
Dept: RADIOLOGY | Facility: HOSPITAL | Age: 19
End: 2019-07-03

## 2019-07-03 DIAGNOSIS — R68.81 EARLY SATIETY: ICD-10-CM

## 2019-07-03 DIAGNOSIS — K31.84 GASTROPARESIS: Primary | ICD-10-CM

## 2019-07-03 DIAGNOSIS — R10.84 GENERALIZED POSTPRANDIAL ABDOMINAL PAIN: ICD-10-CM

## 2019-07-03 PROCEDURE — 78264 GASTRIC EMPTYING IMG STUDY: CPT

## 2019-07-03 PROCEDURE — A9541 TC99M SULFUR COLLOID: HCPCS

## 2019-07-03 RX ORDER — BETHANECHOL CHLORIDE 5 MG
5 TABLET ORAL
Qty: 60 TABLET | Refills: 3 | Status: SHIPPED | OUTPATIENT
Start: 2019-07-03 | End: 2019-07-19 | Stop reason: SDUPTHER

## 2019-07-03 NOTE — TELEPHONE ENCOUNTER
Left a detailed message on Rimma Mcnamara is answering machine regarding new onset gastroparesis and to take bethanechol 5 milligrams twice daily before lunch and before dinner

## 2019-07-15 ENCOUNTER — OFFICE VISIT (OUTPATIENT)
Dept: PSYCHIATRY | Facility: CLINIC | Age: 19
End: 2019-07-15
Payer: COMMERCIAL

## 2019-07-15 VITALS
SYSTOLIC BLOOD PRESSURE: 118 MMHG | HEART RATE: 116 BPM | HEIGHT: 65 IN | DIASTOLIC BLOOD PRESSURE: 88 MMHG | BODY MASS INDEX: 21.13 KG/M2 | WEIGHT: 126.8 LBS

## 2019-07-15 DIAGNOSIS — F32.A DEPRESSIVE DISORDER: Primary | ICD-10-CM

## 2019-07-15 DIAGNOSIS — F41.9 ANXIETY DISORDER, UNSPECIFIED TYPE: ICD-10-CM

## 2019-07-15 PROCEDURE — 90833 PSYTX W PT W E/M 30 MIN: CPT | Performed by: STUDENT IN AN ORGANIZED HEALTH CARE EDUCATION/TRAINING PROGRAM

## 2019-07-15 PROCEDURE — 99214 OFFICE O/P EST MOD 30 MIN: CPT | Performed by: STUDENT IN AN ORGANIZED HEALTH CARE EDUCATION/TRAINING PROGRAM

## 2019-07-15 PROCEDURE — 3725F SCREEN DEPRESSION PERFORMED: CPT | Performed by: STUDENT IN AN ORGANIZED HEALTH CARE EDUCATION/TRAINING PROGRAM

## 2019-07-15 RX ORDER — QUETIAPINE 150 MG/1
150 TABLET, FILM COATED, EXTENDED RELEASE ORAL
Qty: 30 TABLET | Refills: 1 | Status: SHIPPED | OUTPATIENT
Start: 2019-07-15 | End: 2019-09-02 | Stop reason: SDUPTHER

## 2019-07-15 NOTE — PSYCH
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), no significant PPH, no past psychiatric hospitalizations, no past suicide attempts, no h/o self-injurious behaviors, no h/o physical altercations, PMH significant for h/o lyme disease, gastritis, h/o esophageal candidiasis, no active substance use, presents to [place] [address] outpatient clinic on referral from gastroenterologist for psychosomatic contributions to chronic abdominal pain with patient reporting "I need help controlling the pain" with father reporting "she's had stress and anxiety "      On problem-focused interview:  1  Somatic Symptoms- She reports that she has a lot nausea lately, recently had a gastric emptying study showing that her gastroparesis has been worse  She reports still having chest pains        2  Mood/Anxiety- She reports that it has been harder to fall asleep and stay asleep  She reports that she uses a CBD pen to help fall asleep  She reports generally sleeping about 6 hours per night  Patient reports her mood has been "irritable, annoyed, about the same," father reports that she continues to get annoyed  Father reports that mother-in-law has been a trigger for the some of the stress at home  Patient reports that she is triggered by small things  She reports that she still feels very anxious especially when she isn't doing anything  Patient rates her anxiety about 7/10 intensity on most days  Patient reports that the visual hallucinations have gotten better, reports that she feels she sees people in corners at staring at her, happens during the day or night     She reports that she hears jibberish at times  Patient reports her appetite has bene okay  Patient denies any passive or active suicidal ideation, intent, or plan  She reports that she helps out with chores at home  Medication helping with symptoms, family and school stressors are main exacerbating factors  Review Of Systems:     Constitutional Negative   ENT Negative   Cardiovascular Palpitations   Respiratory Negative   Gastrointestinal Negative   Genitourinary Negative   Musculoskeletal Negative   Integumentary Negative   Neurological Headache   Endocrine Negative     Past Medical History:   Patient Active Problem List   Diagnosis    Anxiety disorder    Headache, migraine    Irregular menses    Rapid or irregular heartbeat    Functional dyspepsia    Fatigue    Depressive disorder    Gastroparesis       Allergies: Allergies   Allergen Reactions    Pineapple Anaphylaxis    Penicillins Hives and Vomiting       Past Surgical History:   Past Surgical History:   Procedure Laterality Date    ESOPHAGOGASTRODUODENOSCOPY      EGD with bipsy       Past Psychiatric History:  No significant PPH, no past psychiatric hospitalizations, no past suicide attempts, no h/o self-injurious behaviors, no h/o physical altercations       Past Med Trials: Fluoxetine up to 40 mg daily (ineffective)      Family Psychiatric History:   Father- Bipolar II d/o (on Prozac, Oxcarbazepine)  Pat  Great Grandmother- Schizophrenia  Mother- Panic Attacks (Xanax, previously on Zoloft)  Mat  Grandmother- Anxiety  Maternal Side- Chronic abdominal pain     No FH of suicide      Social History:   Domiciled with parents, brother (15 y/o) in [place]  Mother employed in [email or web address], father employed as pharmaceutical consultant  Reports infrequent caffeine use   No active substance use       Substance Abuse History:   Alcohol- drinking occasionally on weekends, a couple of drinks  Cannabis- occasional use, about once per month     Traumatic History: Denies any h/o physical or sexual abuse      The following portions of the patient's history were reviewed and updated as appropriate: allergies, current medications, past family history, past medical history, past social history, past surgical history and problem list     Objective:  Vitals:    07/15/19 1031   BP: 118/88   Pulse: (!) 116     Height: 5' 5" (165 1 cm)   Weight (last 2 days)     Date/Time   Weight    07/15/19 1031   57 5 (126 8)              Mental status:  Appearance sitting comfortably in chair, dressed in casual clothing, cooperative with interview, fairly well related   Mood "irritable, annoyed, about the same"   Affect Appears mildly constricted in depressed range, stable, mood-congruent   Speech Normal rate, rhythm, and volume   Thought Processes Linear and goal directed   Associations intact associations   Hallucinations Denies any auditory or visual hallucinations   Thought Content No passive or active suicidal or homicidal ideation, intent, or plan  Orientation Oriented to person, place, time, and situation   Recent and Remote Memory grossly intact   Attention Span and Concentration concentration intact   Intellect Appears to be Above Average Intelligence   Insight Insight intact   Judgement judgment was intact   Muscle Strength Muscle strength and tone were normal   Language Within normal limits   Fund of Knowledge Age appropriate   Pain none     Assessment/Plan:       Diagnoses and all orders for this visit:    Depressive disorder  -     ECG 12 lead; Future  -     QUEtiapine (SEROquel XR) 150 mg 24 hr tablet; Take 1 tablet (150 mg total) by mouth daily at bedtime          Diagnosis: 1   Unspecified anxiety disorder, r/o generalized anxiety disorder, 2  Unspecified Depressive Disorder, r/o bipolar disorder, 3  r/o unspecified somatic symptom disorder       Treatment Recommendations:    19-12 y/o Female, domiciled with parents, brother (15 y/o) in [address] in microbiology), no significant PPH, no past psychiatric hospitalizations, no past suicide attempts, no h/o self-injurious behaviors, no h/o physical altercations, PMH significant for h/o lyme disease, gastritis, h/o esophageal candidiasis, no active substance use, [facility] on referral from gastroenterologist for psychosomatic contributions to chronic abdominal pain with patient reporting "I need help controlling the pain" with father reporting "she's had stress and anxiety "      On assessment today, patient continues to have worsening of mood and anxiety symptoms, frequently irritable, continues to have vague visual and auditory perceptual disturbances, difficulty sleeping, in psychosocial context of strong FH of mental health problems, living far away from home   No current passive or active SI, intent, or plan  Currently, patient is not an imminent risk of harm to self or others and is appropriate for outpatient level of care at this time      Plan:  1  Depression, Anxiety, Chronic Abdominal Pain- Will continue Zoloft 100 mg daily for mood and anxiety symptoms   Continue titration of Seroquel XR to 150 mg qhs to help with mood stabilization, anxiety, perceptual disturbances  Will continue Ativan up to 1 mg daily prn severe anxiety or panic attacks  PHQ-A score of 19, severe depression, (7/15/19), JADEN-7 score of 20,severe anxiety symptoms (7/15/19)  2  Medical- Encouraged to obtain metabolic labwork  Ordered EKG given concerns about tachycardia, QTc monitoring  Continue to f/u with GI doctors for management of abdominal pain  F/u with PCP for on-going medical issues  3  F/u with this provider in 3 weeks      Risks, Benefits And Possible Side Effects Of Medications:  Reviewed risks/benefits and side effects of antidepressant medications including black box warning on antidepressants, patient and family verbalize understanding       Psychotherapy Provided: Family psychotherapy provided  Counseling was provided during the session today for 20 minutes  Medications, treatment progress and treatment plan reviewed with Tesfaye Booth  Recent stressor including family problems, school stress, everyday stressors and ongoing anxiety discussed with Tesfaye Booth  Coping strategies including deep/slow breathing, improving self-esteem, prioritize important tasks, relaxation, stress reduction, spending time with family and spending time with friends reviewed with Tesfaye Booth  Reassurance and supportive therapy provided

## 2019-07-15 NOTE — BH TREATMENT PLAN
TREATMENT PLAN (Medication Management Only)        [address]    Name and Date of Birth:  Joelle Meadows 22 y o  2000  Date of Treatment Plan: July 15, 2019  Diagnosis/Diagnoses:    1  Depressive disorder    2  Anxiety disorder, unspecified type      Strengths/Personal Resources for Self-Care: "Insight into feelings and anxiety, smart, intelligent"  Area/Areas of need (in own words): "Controlling irritability, depression"  1  Long Term Goal: Re-gain control of emotional state  Target Date: 1 year - 7/15/2020  Person/Persons responsible for completion of goal: RONALD Rendon   2   Short Term Objective (s) - How will we reach this goal?:   A  Provider new recommended medication/dosage changes and/or continue medication(s): Will titrate Seroquel XR, continue Zoloft  B   Work on sleep hygiene, prepare for return to school       Target Date: 3 months - 10/15/2019  Person/Persons Responsible for Completion of Goal: RONALD Rendon  Progress Towards Goals: continuing treatment  Treatment Modality: medication management every 3 months  Review due 90 to 120 days from date of this plan: 3 months - 10/15/2019  Expected length of service: maintenance  My Physician/PA/NP and I have developed this plan together and I agree to work on the goals and objectives  I understand the treatment goals that were developed for my treatment

## 2019-07-19 ENCOUNTER — OFFICE VISIT (OUTPATIENT)
Dept: GASTROENTEROLOGY | Facility: CLINIC | Age: 19
End: 2019-07-19
Payer: COMMERCIAL

## 2019-07-19 VITALS
WEIGHT: 127.87 LBS | HEART RATE: 82 BPM | BODY MASS INDEX: 21.83 KG/M2 | TEMPERATURE: 97.5 F | RESPIRATION RATE: 12 BRPM | SYSTOLIC BLOOD PRESSURE: 110 MMHG | DIASTOLIC BLOOD PRESSURE: 68 MMHG | HEIGHT: 64 IN

## 2019-07-19 DIAGNOSIS — K31.84 GASTROPARESIS: Primary | ICD-10-CM

## 2019-07-19 DIAGNOSIS — K21.9 GASTROESOPHAGEAL REFLUX DISEASE WITHOUT ESOPHAGITIS: ICD-10-CM

## 2019-07-19 DIAGNOSIS — K30 FUNCTIONAL DYSPEPSIA: ICD-10-CM

## 2019-07-19 PROCEDURE — 99214 OFFICE O/P EST MOD 30 MIN: CPT | Performed by: NURSE PRACTITIONER

## 2019-07-19 RX ORDER — OMEPRAZOLE 40 MG/1
40 CAPSULE, DELAYED RELEASE ORAL DAILY
Qty: 30 CAPSULE | Refills: 5 | Status: SHIPPED | OUTPATIENT
Start: 2019-07-19 | End: 2019-12-30

## 2019-07-19 RX ORDER — BETHANECHOL CHLORIDE 10 MG/1
10 TABLET ORAL
Qty: 60 TABLET | Refills: 5 | Status: SHIPPED | OUTPATIENT
Start: 2019-07-19 | End: 2020-06-18

## 2019-07-19 NOTE — PROGRESS NOTES
Assessment/Plan:    Tricia Tucker has new onset gastroparesis  She had a good response to the bethanechol which was started over the interval and today we would like to increase her to 10 mg twice daily before meals  We have asked that she continue omeprazole 40 mg daily  Follow-up is planned in December during her [place] break from college  Diagnoses and all orders for this visit:    Gastroparesis  -     bethanechol (URECHOLINE) 10 mg tablet; Take 1 tablet (10 mg total) by mouth 2 (two) times a day before meals - lunch and dinner    Gastroesophageal reflux disease without esophagitis    Functional dyspepsia  -     omeprazole (PriLOSEC) 40 MG capsule; Take 1 capsule (40 mg total) by mouth daily          Subjective:      Patient ID: Keyshawn Vora is a 20 y o  female  Tricia Tucker was seen in follow-up after a 1 month interval for dyspepsia, poor appetite pots, and early satiety  Her nuclear medicine scan returned indicating that her gastroparesis has been exacerbated  She is having slower gastric emptying than her last nuclear medicine scan only emptying 35% at 2:00 a m  and 67% by 4 hours  She was started on bethanechol when we receive the results and she reports that she is eating much easier although still having somewhat of a difficult time with abdominal pain after meals  She has had no vomiting  She has continued to take omeprazole daily  As you know, her EGD was performed can was negative  Today we discussed that we can increase the dose of the bethanechol to assist with better emptying  Her bowel movements have remained regular  Today she reports that she will be going back to college in [place] in 2 weeks        The following portions of the patient's history were reviewed and updated as appropriate: allergies, current medications, past family history, past medical history, past social history, past surgical history and problem list     Review of Systems   Constitutional: Negative for activity change, appetite change (improved), fatigue and unexpected weight change (2 lb weight gain over the past month)  HENT: Negative for congestion, rhinorrhea and trouble swallowing  Eyes: Negative  Respiratory: Negative for cough and wheezing  Gastrointestinal: Positive for abdominal pain ( improving since starting bethanechol)  Negative for abdominal distention, blood in stool, constipation, diarrhea, nausea and vomiting  Genitourinary: Negative  Musculoskeletal: Negative for arthralgias and myalgias  Skin: Negative for pallor  Allergic/Immunologic: Negative for environmental allergies and food allergies  Neurological: Negative for light-headedness and headaches  Psychiatric/Behavioral: Negative for behavioral problems and sleep disturbance  The patient is not nervous/anxious  Objective:      /68 (BP Location: Left arm, Patient Position: Sitting, Cuff Size: Adult)   Pulse 82   Temp 97 5 °F (36 4 °C) (Temporal)   Resp 12   Ht 5' 4 49" (1 638 m)   Wt 58 kg (127 lb 13 9 oz)   BMI 21 62 kg/m²          Physical Exam   Constitutional: She is oriented to person, place, and time  She appears well-developed and well-nourished  No distress  HENT:   Head: Normocephalic and atraumatic  Eyes: Conjunctivae are normal    Cardiovascular: Normal rate, regular rhythm and normal heart sounds  No murmur heard  Pulmonary/Chest: Effort normal and breath sounds normal  No respiratory distress  Abdominal: Soft  She exhibits no distension  There is no hepatomegaly  There is no tenderness  There is no guarding  Neurological: She is alert and oriented to person, place, and time  Skin: Skin is warm and dry  No rash noted  No pallor  Psychiatric: She has a normal mood and affect  Her behavior is normal    Nursing note and vitals reviewed

## 2019-07-19 NOTE — PATIENT INSTRUCTIONS
Drake Patient has new onset gastroparesis  She had a good response to the bethanechol which was started over the interval and today we would like to increase her to 10 mg twice daily before meals  We have asked that she continue omeprazole 40 mg daily  Follow-up is planned in December during her [employer] break from [facility]

## 2019-07-26 ENCOUNTER — APPOINTMENT (OUTPATIENT)
Dept: LAB | Facility: CLINIC | Age: 19
End: 2019-07-26
Payer: COMMERCIAL

## 2019-07-26 DIAGNOSIS — F32.A DEPRESSIVE DISORDER: ICD-10-CM

## 2019-07-26 LAB
ALBUMIN SERPL BCP-MCNC: 3.9 G/DL (ref 3.5–5)
ALP SERPL-CCNC: 63 U/L (ref 46–116)
ALT SERPL W P-5'-P-CCNC: 13 U/L (ref 12–78)
ANION GAP SERPL CALCULATED.3IONS-SCNC: 7 MMOL/L (ref 4–13)
AST SERPL W P-5'-P-CCNC: 12 U/L (ref 5–45)
BASOPHILS # BLD AUTO: 0.04 THOUSANDS/ΜL (ref 0–0.1)
BASOPHILS NFR BLD AUTO: 1 % (ref 0–1)
BILIRUB SERPL-MCNC: 0.32 MG/DL (ref 0.2–1)
BUN SERPL-MCNC: 11 MG/DL (ref 5–25)
CALCIUM SERPL-MCNC: 9.2 MG/DL (ref 8.3–10.1)
CHLORIDE SERPL-SCNC: 108 MMOL/L (ref 100–108)
CHOLEST SERPL-MCNC: 188 MG/DL (ref 50–200)
CO2 SERPL-SCNC: 26 MMOL/L (ref 21–32)
CREAT SERPL-MCNC: 0.93 MG/DL (ref 0.6–1.3)
EOSINOPHIL # BLD AUTO: 0.06 THOUSAND/ΜL (ref 0–0.61)
EOSINOPHIL NFR BLD AUTO: 1 % (ref 0–6)
ERYTHROCYTE [DISTWIDTH] IN BLOOD BY AUTOMATED COUNT: 12.7 % (ref 11.6–15.1)
EST. AVERAGE GLUCOSE BLD GHB EST-MCNC: 97 MG/DL
GFR SERPL CREATININE-BSD FRML MDRD: 89 ML/MIN/1.73SQ M
GLUCOSE P FAST SERPL-MCNC: 100 MG/DL (ref 65–99)
HBA1C MFR BLD: 5 % (ref 4.2–6.3)
HCT VFR BLD AUTO: 40.6 % (ref 34.8–46.1)
HDLC SERPL-MCNC: 67 MG/DL (ref 40–60)
HGB BLD-MCNC: 12.9 G/DL (ref 11.5–15.4)
IMM GRANULOCYTES # BLD AUTO: 0.03 THOUSAND/UL (ref 0–0.2)
IMM GRANULOCYTES NFR BLD AUTO: 0 % (ref 0–2)
LDLC SERPL CALC-MCNC: 97 MG/DL (ref 0–100)
LYMPHOCYTES # BLD AUTO: 2.69 THOUSANDS/ΜL (ref 0.6–4.47)
LYMPHOCYTES NFR BLD AUTO: 30 % (ref 14–44)
MCH RBC QN AUTO: 28 PG (ref 26.8–34.3)
MCHC RBC AUTO-ENTMCNC: 31.8 G/DL (ref 31.4–37.4)
MCV RBC AUTO: 88 FL (ref 82–98)
MONOCYTES # BLD AUTO: 0.55 THOUSAND/ΜL (ref 0.17–1.22)
MONOCYTES NFR BLD AUTO: 6 % (ref 4–12)
NEUTROPHILS # BLD AUTO: 5.48 THOUSANDS/ΜL (ref 1.85–7.62)
NEUTS SEG NFR BLD AUTO: 62 % (ref 43–75)
NONHDLC SERPL-MCNC: 121 MG/DL
NRBC BLD AUTO-RTO: 0 /100 WBCS
PLATELET # BLD AUTO: 285 THOUSANDS/UL (ref 149–390)
PMV BLD AUTO: 10.5 FL (ref 8.9–12.7)
POTASSIUM SERPL-SCNC: 3.6 MMOL/L (ref 3.5–5.3)
PROT SERPL-MCNC: 7.5 G/DL (ref 6.4–8.2)
RBC # BLD AUTO: 4.6 MILLION/UL (ref 3.81–5.12)
SODIUM SERPL-SCNC: 141 MMOL/L (ref 136–145)
TRIGL SERPL-MCNC: 121 MG/DL
TSH SERPL DL<=0.05 MIU/L-ACNC: 3.93 UIU/ML (ref 0.46–3.98)
WBC # BLD AUTO: 8.85 THOUSAND/UL (ref 4.31–10.16)

## 2019-07-26 PROCEDURE — 84443 ASSAY THYROID STIM HORMONE: CPT

## 2019-07-26 PROCEDURE — 80053 COMPREHEN METABOLIC PANEL: CPT

## 2019-07-26 PROCEDURE — 85025 COMPLETE CBC W/AUTO DIFF WBC: CPT

## 2019-07-26 PROCEDURE — 36415 COLL VENOUS BLD VENIPUNCTURE: CPT

## 2019-07-26 PROCEDURE — 83036 HEMOGLOBIN GLYCOSYLATED A1C: CPT

## 2019-07-26 PROCEDURE — 80061 LIPID PANEL: CPT

## 2019-08-08 ENCOUNTER — OFFICE VISIT (OUTPATIENT)
Dept: URGENT CARE | Facility: CLINIC | Age: 19
End: 2019-08-08
Payer: COMMERCIAL

## 2019-08-08 VITALS
HEIGHT: 65 IN | HEART RATE: 91 BPM | BODY MASS INDEX: 21.69 KG/M2 | OXYGEN SATURATION: 98 % | SYSTOLIC BLOOD PRESSURE: 124 MMHG | DIASTOLIC BLOOD PRESSURE: 81 MMHG | WEIGHT: 130.2 LBS | TEMPERATURE: 97.5 F

## 2019-08-08 DIAGNOSIS — L23.89 ALLERGIC CONTACT DERMATITIS DUE TO OTHER AGENTS: Primary | ICD-10-CM

## 2019-08-08 PROCEDURE — 99213 OFFICE O/P EST LOW 20 MIN: CPT | Performed by: PHYSICIAN ASSISTANT

## 2019-08-08 RX ORDER — PREDNISONE 20 MG/1
40 TABLET ORAL DAILY
Qty: 8 TABLET | Refills: 0 | Status: SHIPPED | OUTPATIENT
Start: 2019-08-08 | End: 2019-08-12

## 2019-08-08 NOTE — PROGRESS NOTES
[facility] [facility]        NAME: Eldon Abel is a 20 y o  female  : 2000    MRN: [id number]  DATE: 2019  TIME: 7:49 PM    Assessment and Plan   Allergic contact dermatitis due to other agents [L23 89]  1  Allergic contact dermatitis due to other agents  predniSONE 20 mg tablet         Patient Instructions    Please take steroids as directed   may continue to use hydrocortisone cream on the rash   may continue to use Benadryl for itching    Follow up with PCP in 3-5 days  Proceed to  ER if symptoms worsen  Chief Complaint     Chief Complaint   Patient presents with    Rash     pt c o x2days wore a necklace that she hasnt worn  Yesterday at 5pm noticed a rash around neck  Burning, slight itching and tender touch  History of Present Illness        Patient is a 24-year-old female who comes in with complaints of rash around her neck  She states it is very itchy  She wore her grandmother's costume jewelry yesterday and then the rash became at present  She has tried Benadryl, oatmeal, Cortaid with little relief  She denies any lip or tongue swelling, shortness of breath, chest tightness  Review of Systems   Review of Systems   Constitutional: Negative for fever  HENT: Negative for trouble swallowing  Respiratory: Negative for shortness of breath  Cardiovascular: Negative for chest pain  Gastrointestinal: Negative for abdominal pain  Skin: Positive for rash           Current Medications       Current Outpatient Medications:     albuterol (PROAIR HFA) 90 mcg/act inhaler, Inhale, Disp: , Rfl:     bethanechol (URECHOLINE) 10 mg tablet, Take 1 tablet (10 mg total) by mouth 2 (two) times a day before meals - lunch and dinner, Disp: 60 tablet, Rfl: 5    LORazepam (ATIVAN) 1 mg tablet, Take 1 tablet (1 mg total) by mouth daily as needed for anxiety, Disp: 30 tablet, Rfl: 2    MARLISSA 0 15-30 MG-MCG per tablet, TAKE 1 TABLET BY MOUTH DAILY, Disp: 84 tablet, Rfl: 3   omeprazole (PriLOSEC) 40 MG capsule, Take 1 capsule (40 mg total) by mouth daily, Disp: 30 capsule, Rfl: 5    QUEtiapine (SEROquel XR) 150 mg 24 hr tablet, Take 1 tablet (150 mg total) by mouth daily at bedtime, Disp: 30 tablet, Rfl: 1    sertraline (ZOLOFT) 100 mg tablet, Take 1 tablet (100 mg total) by mouth daily, Disp: 90 tablet, Rfl: 0    predniSONE 20 mg tablet, Take 2 tablets (40 mg total) by mouth daily for 4 days, Disp: 8 tablet, Rfl: 0    Current Allergies     Allergies as of 08/08/2019 - Reviewed 08/08/2019   Allergen Reaction Noted    Pineapple Anaphylaxis 01/03/2019    Penicillins Hives and Vomiting 01/20/2016            The following portions of the patient's history were reviewed and updated as appropriate: allergies, current medications, past family history, past medical history, past social history, past surgical history and problem list      Past Medical History:   Diagnosis Date    Anemia     Anxiety     Depression     Exertional shortness of breath     Resolved 12/23/2015     Gastritis     Gastroparesis     Resolved 10/21/2016     Lyme disease     Syncope     Resolved 11/18/2015        Past Surgical History:   Procedure Laterality Date    ESOPHAGOGASTRODUODENOSCOPY      EGD with bipsy       Family History   Problem Relation Age of Onset    Anxiety disorder Mother     Bipolar disorder Father     Sleep disorder Father     Anxiety disorder Maternal Grandmother     Hyperlipidemia Maternal Grandmother     Hypertension Maternal Grandmother     Thyroid disease Maternal Grandmother     Kidney disease Paternal Grandmother     Lung cancer Maternal Grandfather     Prostate cancer Paternal Grandfather     Substance Abuse Neg Hx          Medications have been verified          Objective   /81 (BP Location: Right arm, Patient Position: Sitting, Cuff Size: Standard)   Pulse 91   Temp 97 5 °F (36 4 °C)   Ht 5' 5" (1 651 m)   Wt 59 1 kg (130 lb 3 2 oz)   SpO2 98%   BMI 21 67 kg/m²        Physical Exam     Physical Exam   Constitutional: She appears well-developed and well-nourished  HENT:   Head: Normocephalic and atraumatic  Mouth/Throat: Oropharynx is clear and moist    Eyes: Pupils are equal, round, and reactive to light  Conjunctivae are normal    Cardiovascular: Normal rate and regular rhythm  Pulmonary/Chest: Effort normal and breath sounds normal    Skin: Skin is warm and dry  Rash noted

## 2019-08-08 NOTE — PATIENT INSTRUCTIONS
Please take steroids as directed   may continue to use hydrocortisone cream on the rash   may continue to use Benadryl for itching    Contact Dermatitis   WHAT YOU NEED TO KNOW:   Contact dermatitis is a skin rash  It develops when you touch something that irritates your skin or causes an allergic reaction  DISCHARGE INSTRUCTIONS:   Call 911 for any of the following:   · You have sudden trouble breathing  · Your throat swells and you have trouble eating  · Your face is swollen  Contact your healthcare provider if:   · You have a fever  · Your blisters are draining pus  · Your rash spreads or does not get better, even after treatment  · You have questions or concerns about your condition or care  Medicines:   · Medicines  help decrease itching and swelling  They will be given as a topical medicine to apply to your rash or as a pill  · Take your medicine as directed  Contact your healthcare provider if you think your medicine is not helping or if you have side effects  Tell him or her if you are allergic to any medicine  Keep a list of the medicines, vitamins, and herbs you take  Include the amounts, and when and why you take them  Bring the list or the pill bottles to follow-up visits  Carry your medicine list with you in case of an emergency  Manage contact dermatitis:   · Take short baths or showers in cool water  Use mild soap or soap-free cleansers  Add oatmeal, baking soda, or cornstarch to the bath water to help decrease skin irritation  · Avoid skin irritants , such as makeup, hair products, soaps, and cleansers  Use products that do not contain perfume or dye  · Apply a cool compress to your rash  This will help soothe your skin  · Keep your skin moist   Rub unscented cream or lotion on your skin to prevent dryness and itching  Do this right after a bath or shower when your skin is still damp    Follow up with your healthcare provider or dermatologist in 2 to 3 days: Write down your questions so you remember to ask them during your visits  © 2017 [address] [employer] Information is for End User's use only and may not be sold, redistributed or otherwise used for commercial purposes  All illustrations and images included in CareNotes® are the copyrighted property of A D A M , Inc  or [employer]iaz[employer] Younger  The above information is an  only  It is not intended as medical advice for individual conditions or treatments  Talk to your doctor, nurse or pharmacist before following any medical regimen to see if it is safe and effective for you

## 2019-08-13 ENCOUNTER — TELEPHONE (OUTPATIENT)
Dept: GASTROENTEROLOGY | Facility: CLINIC | Age: 19
End: 2019-08-13

## 2019-08-13 NOTE — TELEPHONE ENCOUNTER
Father states that omeprazole 40mgs needs a prior [facility]  Call[facility][phone]  Mother aware

## 2019-09-02 DIAGNOSIS — F32.A DEPRESSIVE DISORDER: ICD-10-CM

## 2019-09-03 RX ORDER — QUETIAPINE 150 MG/1
150 TABLET, FILM COATED, EXTENDED RELEASE ORAL
Qty: 30 TABLET | Refills: 1 | Status: SHIPPED | OUTPATIENT
Start: 2019-09-03 | End: 2019-11-27 | Stop reason: SDUPTHER

## 2019-10-26 DIAGNOSIS — F41.9 ANXIETY DISORDER, UNSPECIFIED TYPE: ICD-10-CM

## 2019-10-26 DIAGNOSIS — F32.A DEPRESSIVE DISORDER: ICD-10-CM

## 2019-10-27 RX ORDER — SERTRALINE HYDROCHLORIDE 100 MG/1
TABLET, FILM COATED ORAL
Qty: 90 TABLET | Refills: 0 | Status: SHIPPED | OUTPATIENT
Start: 2019-10-27 | End: 2020-03-22

## 2019-11-27 DIAGNOSIS — F32.A DEPRESSIVE DISORDER: ICD-10-CM

## 2019-11-27 RX ORDER — QUETIAPINE 150 MG/1
150 TABLET, FILM COATED, EXTENDED RELEASE ORAL
Qty: 30 TABLET | Refills: 0 | Status: SHIPPED | OUTPATIENT
Start: 2019-11-27 | End: 2020-01-27 | Stop reason: SDUPTHER

## 2019-12-30 ENCOUNTER — TELEPHONE (OUTPATIENT)
Dept: GASTROENTEROLOGY | Facility: CLINIC | Age: 19
End: 2019-12-30

## 2019-12-30 DIAGNOSIS — K30 FUNCTIONAL DYSPEPSIA: ICD-10-CM

## 2019-12-30 RX ORDER — OMEPRAZOLE 40 MG/1
40 CAPSULE, DELAYED RELEASE ORAL DAILY
Qty: 90 CAPSULE | Refills: 1 | Status: SHIPPED | OUTPATIENT
Start: 2019-12-30 | End: 2020-05-08 | Stop reason: SDUPTHER

## 2020-01-20 DIAGNOSIS — Z30.41 USES ORAL CONTRACEPTION: ICD-10-CM

## 2020-01-21 RX ORDER — LEVONORGESTREL AND ETHINYL ESTRADIOL 0.15-0.03
1 KIT ORAL DAILY
Qty: 84 TABLET | Refills: 0 | Status: SHIPPED | OUTPATIENT
Start: 2020-01-21 | End: 2020-04-13

## 2020-01-24 DIAGNOSIS — F32.A DEPRESSIVE DISORDER: ICD-10-CM

## 2020-01-27 DIAGNOSIS — F32.A DEPRESSIVE DISORDER: ICD-10-CM

## 2020-01-27 RX ORDER — QUETIAPINE 150 MG/1
TABLET, FILM COATED, EXTENDED RELEASE ORAL
Qty: 30 TABLET | Refills: 0 | OUTPATIENT
Start: 2020-01-27

## 2020-01-27 RX ORDER — QUETIAPINE 150 MG/1
150 TABLET, FILM COATED, EXTENDED RELEASE ORAL
Qty: 30 TABLET | Refills: 2 | Status: SHIPPED | OUTPATIENT
Start: 2020-01-27 | End: 2020-05-05 | Stop reason: SDUPTHER

## 2020-01-27 RX ORDER — QUETIAPINE 150 MG/1
150 TABLET, FILM COATED, EXTENDED RELEASE ORAL
Qty: 30 TABLET | Refills: 0 | Status: CANCELLED | OUTPATIENT
Start: 2020-01-27

## 2020-01-27 NOTE — PROGRESS NOTES
A refill was provided for the patient's Seroquel  mg to cover until upcoming scheduled appointment on 5/11/2020 with Dr Jeanie Finn

## 2020-01-27 NOTE — TELEPHONE ENCOUNTER
She is away for school so she needs it sent to the [facility] [address]  The phone number is [phone]

## 2020-03-22 DIAGNOSIS — F32.A DEPRESSIVE DISORDER: ICD-10-CM

## 2020-03-22 DIAGNOSIS — F41.9 ANXIETY DISORDER, UNSPECIFIED TYPE: ICD-10-CM

## 2020-03-22 RX ORDER — SERTRALINE HYDROCHLORIDE 100 MG/1
TABLET, FILM COATED ORAL
Qty: 90 TABLET | Refills: 0 | Status: SHIPPED | OUTPATIENT
Start: 2020-03-22 | End: 2020-05-11 | Stop reason: SDUPTHER

## 2020-04-13 DIAGNOSIS — Z30.41 USES ORAL CONTRACEPTION: ICD-10-CM

## 2020-04-13 RX ORDER — LEVONORGESTREL AND ETHINYL ESTRADIOL 0.15-0.03
KIT ORAL
Qty: 84 TABLET | Refills: 0 | Status: SHIPPED | OUTPATIENT
Start: 2020-04-13 | End: 2020-07-21

## 2020-04-16 DIAGNOSIS — F32.A DEPRESSIVE DISORDER: ICD-10-CM

## 2020-04-16 RX ORDER — QUETIAPINE 150 MG/1
TABLET, FILM COATED, EXTENDED RELEASE ORAL
Qty: 30 TABLET | Refills: 0 | OUTPATIENT
Start: 2020-04-16

## 2020-04-27 DIAGNOSIS — F32.A DEPRESSIVE DISORDER: ICD-10-CM

## 2020-04-27 RX ORDER — QUETIAPINE 150 MG/1
TABLET, FILM COATED, EXTENDED RELEASE ORAL
Qty: 30 TABLET | Refills: 2 | OUTPATIENT
Start: 2020-04-27

## 2020-05-01 ENCOUNTER — TELEPHONE (OUTPATIENT)
Dept: OTHER | Facility: OTHER | Age: 20
End: 2020-05-01

## 2020-05-05 DIAGNOSIS — F32.A DEPRESSIVE DISORDER: ICD-10-CM

## 2020-05-05 RX ORDER — QUETIAPINE 150 MG/1
150 TABLET, FILM COATED, EXTENDED RELEASE ORAL
Qty: 30 TABLET | Refills: 0 | Status: SHIPPED | OUTPATIENT
Start: 2020-05-05 | End: 2020-05-11

## 2020-05-07 ENCOUNTER — DOCUMENTATION (OUTPATIENT)
Dept: PSYCHIATRY | Facility: CLINIC | Age: 20
End: 2020-05-07

## 2020-05-08 DIAGNOSIS — K30 FUNCTIONAL DYSPEPSIA: ICD-10-CM

## 2020-05-11 ENCOUNTER — TELEMEDICINE (OUTPATIENT)
Dept: PSYCHIATRY | Facility: CLINIC | Age: 20
End: 2020-05-11
Payer: COMMERCIAL

## 2020-05-11 DIAGNOSIS — F32.A DEPRESSIVE DISORDER: ICD-10-CM

## 2020-05-11 DIAGNOSIS — F41.9 ANXIETY DISORDER, UNSPECIFIED TYPE: Primary | ICD-10-CM

## 2020-05-11 PROCEDURE — 99214 OFFICE O/P EST MOD 30 MIN: CPT | Performed by: STUDENT IN AN ORGANIZED HEALTH CARE EDUCATION/TRAINING PROGRAM

## 2020-05-11 RX ORDER — OMEPRAZOLE 40 MG/1
40 CAPSULE, DELAYED RELEASE ORAL DAILY
Qty: 90 CAPSULE | Refills: 0 | Status: SHIPPED | OUTPATIENT
Start: 2020-05-11 | End: 2020-12-22

## 2020-05-11 RX ORDER — SERTRALINE HYDROCHLORIDE 100 MG/1
100 TABLET, FILM COATED ORAL DAILY
Qty: 90 TABLET | Refills: 0 | Status: SHIPPED | OUTPATIENT
Start: 2020-05-11 | End: 2020-10-20 | Stop reason: SDUPTHER

## 2020-05-11 RX ORDER — ARIPIPRAZOLE 2 MG/1
2 TABLET ORAL DAILY
Qty: 30 TABLET | Refills: 1 | Status: SHIPPED | OUTPATIENT
Start: 2020-05-11 | End: 2020-07-13 | Stop reason: SDUPTHER

## 2020-05-11 RX ORDER — HYDROXYZINE 50 MG/1
50 TABLET, FILM COATED ORAL
Qty: 30 TABLET | Refills: 1 | Status: SHIPPED | OUTPATIENT
Start: 2020-05-11 | End: 2020-12-22

## 2020-05-22 ENCOUNTER — TELEPHONE (OUTPATIENT)
Dept: GASTROENTEROLOGY | Facility: AMBULARY SURGERY CENTER | Age: 20
End: 2020-05-22

## 2020-06-04 ENCOUNTER — TELEMEDICINE (OUTPATIENT)
Dept: GASTROENTEROLOGY | Facility: AMBULARY SURGERY CENTER | Age: 20
End: 2020-06-04
Payer: COMMERCIAL

## 2020-06-04 DIAGNOSIS — K31.84 GASTROPARESIS: Primary | ICD-10-CM

## 2020-06-04 DIAGNOSIS — K21.9 GASTROESOPHAGEAL REFLUX DISEASE, ESOPHAGITIS PRESENCE NOT SPECIFIED: ICD-10-CM

## 2020-06-04 PROCEDURE — 99213 OFFICE O/P EST LOW 20 MIN: CPT | Performed by: INTERNAL MEDICINE

## 2020-06-12 ENCOUNTER — TELEPHONE (OUTPATIENT)
Dept: GASTROENTEROLOGY | Facility: AMBULARY SURGERY CENTER | Age: 20
End: 2020-06-12

## 2020-06-12 NOTE — TELEPHONE ENCOUNTER
Left message with Central Scheduling number, so she can schedule EKG and get number to a dietician, asked for patient to give us a call to schedule 3 month f/u with Miguel Douglas

## 2020-06-12 NOTE — TELEPHONE ENCOUNTER
----- Message from [facility], MD sent at 6/4/2020 11:34 PM EDT -----  Please schedule pt for follow up in GI clinic in 3 mos  Please help pt set up EKG appt  Please schedule appt with dietician        Thank you,  Saniya Romeo

## 2020-06-18 ENCOUNTER — OFFICE VISIT (OUTPATIENT)
Dept: FAMILY MEDICINE CLINIC | Facility: CLINIC | Age: 20
End: 2020-06-18
Payer: COMMERCIAL

## 2020-06-18 ENCOUNTER — TELEMEDICINE (OUTPATIENT)
Dept: PSYCHIATRY | Facility: CLINIC | Age: 20
End: 2020-06-18
Payer: COMMERCIAL

## 2020-06-18 VITALS
BODY MASS INDEX: 22.82 KG/M2 | DIASTOLIC BLOOD PRESSURE: 78 MMHG | RESPIRATION RATE: 16 BRPM | TEMPERATURE: 98 F | OXYGEN SATURATION: 98 % | WEIGHT: 137 LBS | SYSTOLIC BLOOD PRESSURE: 120 MMHG | HEART RATE: 94 BPM | HEIGHT: 65 IN

## 2020-06-18 DIAGNOSIS — Z11.3 SCREENING EXAMINATION FOR STD (SEXUALLY TRANSMITTED DISEASE): ICD-10-CM

## 2020-06-18 DIAGNOSIS — Z11.4 SCREENING FOR HIV (HUMAN IMMUNODEFICIENCY VIRUS): ICD-10-CM

## 2020-06-18 DIAGNOSIS — Z00.00 ANNUAL PHYSICAL EXAM: Primary | ICD-10-CM

## 2020-06-18 DIAGNOSIS — R07.9 CHEST PAIN, UNSPECIFIED TYPE: ICD-10-CM

## 2020-06-18 DIAGNOSIS — F32.A DEPRESSIVE DISORDER: ICD-10-CM

## 2020-06-18 DIAGNOSIS — F41.9 ANXIETY DISORDER, UNSPECIFIED TYPE: Primary | ICD-10-CM

## 2020-06-18 DIAGNOSIS — Z23 NEED FOR HPV VACCINATION: ICD-10-CM

## 2020-06-18 PROCEDURE — 90471 IMMUNIZATION ADMIN: CPT | Performed by: FAMILY MEDICINE

## 2020-06-18 PROCEDURE — 90651 9VHPV VACCINE 2/3 DOSE IM: CPT | Performed by: FAMILY MEDICINE

## 2020-06-18 PROCEDURE — 99214 OFFICE O/P EST MOD 30 MIN: CPT | Performed by: STUDENT IN AN ORGANIZED HEALTH CARE EDUCATION/TRAINING PROGRAM

## 2020-06-18 PROCEDURE — 93000 ELECTROCARDIOGRAM COMPLETE: CPT | Performed by: FAMILY MEDICINE

## 2020-06-18 PROCEDURE — 99395 PREV VISIT EST AGE 18-39: CPT | Performed by: FAMILY MEDICINE

## 2020-06-18 RX ORDER — LORAZEPAM 1 MG/1
1 TABLET ORAL DAILY PRN
Qty: 30 TABLET | Refills: 2 | Status: SHIPPED | OUTPATIENT
Start: 2020-06-18 | End: 2022-05-31 | Stop reason: SDUPTHER

## 2020-06-30 ENCOUNTER — APPOINTMENT (OUTPATIENT)
Dept: LAB | Facility: CLINIC | Age: 20
End: 2020-06-30
Payer: COMMERCIAL

## 2020-06-30 DIAGNOSIS — F32.A DEPRESSIVE DISORDER: ICD-10-CM

## 2020-06-30 LAB
ALBUMIN SERPL BCP-MCNC: 3.7 G/DL (ref 3.5–5)
ALP SERPL-CCNC: 68 U/L (ref 46–116)
ALT SERPL W P-5'-P-CCNC: 16 U/L (ref 12–78)
ANION GAP SERPL CALCULATED.3IONS-SCNC: 7 MMOL/L (ref 4–13)
AST SERPL W P-5'-P-CCNC: 15 U/L (ref 5–45)
BASOPHILS # BLD AUTO: 0.04 THOUSANDS/ΜL (ref 0–0.1)
BASOPHILS NFR BLD AUTO: 1 % (ref 0–1)
BILIRUB SERPL-MCNC: 0.27 MG/DL (ref 0.2–1)
BUN SERPL-MCNC: 9 MG/DL (ref 5–25)
CALCIUM SERPL-MCNC: 9.2 MG/DL (ref 8.3–10.1)
CHLORIDE SERPL-SCNC: 104 MMOL/L (ref 100–108)
CHOLEST SERPL-MCNC: 243 MG/DL (ref 50–200)
CO2 SERPL-SCNC: 26 MMOL/L (ref 21–32)
CREAT SERPL-MCNC: 0.78 MG/DL (ref 0.6–1.3)
EOSINOPHIL # BLD AUTO: 0.05 THOUSAND/ΜL (ref 0–0.61)
EOSINOPHIL NFR BLD AUTO: 1 % (ref 0–6)
ERYTHROCYTE [DISTWIDTH] IN BLOOD BY AUTOMATED COUNT: 12.4 % (ref 11.6–15.1)
EST. AVERAGE GLUCOSE BLD GHB EST-MCNC: 100 MG/DL
GFR SERPL CREATININE-BSD FRML MDRD: 110 ML/MIN/1.73SQ M
GLUCOSE P FAST SERPL-MCNC: 68 MG/DL (ref 65–99)
HBA1C MFR BLD: 5.1 %
HCT VFR BLD AUTO: 42.9 % (ref 34.8–46.1)
HDLC SERPL-MCNC: 69 MG/DL
HGB BLD-MCNC: 13.6 G/DL (ref 11.5–15.4)
IMM GRANULOCYTES # BLD AUTO: 0.01 THOUSAND/UL (ref 0–0.2)
IMM GRANULOCYTES NFR BLD AUTO: 0 % (ref 0–2)
LDLC SERPL CALC-MCNC: 147 MG/DL (ref 0–100)
LYMPHOCYTES # BLD AUTO: 2.64 THOUSANDS/ΜL (ref 0.6–4.47)
LYMPHOCYTES NFR BLD AUTO: 43 % (ref 14–44)
MCH RBC QN AUTO: 27.8 PG (ref 26.8–34.3)
MCHC RBC AUTO-ENTMCNC: 31.7 G/DL (ref 31.4–37.4)
MCV RBC AUTO: 88 FL (ref 82–98)
MONOCYTES # BLD AUTO: 0.43 THOUSAND/ΜL (ref 0.17–1.22)
MONOCYTES NFR BLD AUTO: 7 % (ref 4–12)
NEUTROPHILS # BLD AUTO: 2.97 THOUSANDS/ΜL (ref 1.85–7.62)
NEUTS SEG NFR BLD AUTO: 48 % (ref 43–75)
NONHDLC SERPL-MCNC: 174 MG/DL
NRBC BLD AUTO-RTO: 0 /100 WBCS
PLATELET # BLD AUTO: 300 THOUSANDS/UL (ref 149–390)
PMV BLD AUTO: 9.9 FL (ref 8.9–12.7)
POTASSIUM SERPL-SCNC: 3.8 MMOL/L (ref 3.5–5.3)
PROT SERPL-MCNC: 8.1 G/DL (ref 6.4–8.2)
RBC # BLD AUTO: 4.89 MILLION/UL (ref 3.81–5.12)
SODIUM SERPL-SCNC: 137 MMOL/L (ref 136–145)
TRIGL SERPL-MCNC: 135 MG/DL
TSH SERPL DL<=0.05 MIU/L-ACNC: 2.58 UIU/ML (ref 0.46–3.98)
WBC # BLD AUTO: 6.14 THOUSAND/UL (ref 4.31–10.16)

## 2020-06-30 PROCEDURE — 36415 COLL VENOUS BLD VENIPUNCTURE: CPT

## 2020-06-30 PROCEDURE — 80061 LIPID PANEL: CPT

## 2020-06-30 PROCEDURE — 83036 HEMOGLOBIN GLYCOSYLATED A1C: CPT

## 2020-06-30 PROCEDURE — 84443 ASSAY THYROID STIM HORMONE: CPT

## 2020-06-30 PROCEDURE — 85025 COMPLETE CBC W/AUTO DIFF WBC: CPT

## 2020-06-30 PROCEDURE — 80053 COMPREHEN METABOLIC PANEL: CPT

## 2020-07-01 ENCOUNTER — CLINICAL SUPPORT (OUTPATIENT)
Dept: NUTRITION | Facility: HOSPITAL | Age: 20
End: 2020-07-01
Attending: INTERNAL MEDICINE
Payer: COMMERCIAL

## 2020-07-01 VITALS — BODY MASS INDEX: 23.5 KG/M2 | WEIGHT: 141.2 LBS

## 2020-07-01 DIAGNOSIS — K31.84 GASTROPARESIS: ICD-10-CM

## 2020-07-01 PROCEDURE — 97802 MEDICAL NUTRITION INDIV IN: CPT | Performed by: DIETITIAN, REGISTERED

## 2020-07-01 NOTE — PROGRESS NOTES
Initial Nutrition Assessment Form    Patient Name: Kashmir Moseley    YOB: 2000    Sex: Female     Assessment Date: 7/1/2020  Start Time: 1:05 Stop Time: 1:35 Total Minutes: 30      Data:  Present at session: self   Parent/Patient Concerns: Gastroparesis   Medical Dx/Reason for Referral: K31 [id number] Gastroparesis   Past Medical History:   Diagnosis Date    Anemia     Anxiety     Depression     Exertional shortness of breath     Resolved 12/23/2015     Gastritis     Gastroparesis     Resolved 10/21/2016     Lyme disease     Syncope     Resolved 11/18/2015        Current Outpatient Medications   Medication Sig Dispense Refill    ARIPiprazole (ABILIFY) 2 mg tablet Take 1 tablet (2 mg total) by mouth daily 30 tablet 1    hydrOXYzine HCL (ATARAX) 50 mg tablet Take 1 tablet (50 mg total) by mouth daily at bedtime as needed (Take up to full tablet qhs prn insomnia) 30 tablet 1    LORazepam (ATIVAN) 1 mg tablet Take 1 tablet (1 mg total) by mouth daily as needed for anxiety 30 tablet 2    MARLISSA 0 15-30 MG-MCG per tablet TAKE 1 TABLET BY MOUTH EVERY DAY 84 tablet 0    omeprazole (PriLOSEC) 40 MG capsule Take 1 capsule (40 mg total) by mouth daily 90 capsule 0    sertraline (ZOLOFT) 100 mg tablet Take 1 tablet (100 mg total) by mouth daily 90 tablet 0     No current facility-administered medications for this visit           Additional Meds/Supplements:    Special Learning Needs:    Height: HC Readings from Last 3 Encounters:   No data found for [facility]       Weight: Wt Readings from Last 10 Encounters:   07/01/20 64 kg (141 lb 3 2 oz)   06/18/20 62 1 kg (137 lb)   08/08/19 59 1 kg (130 lb 3 2 oz)   07/19/19 58 kg (127 lb 13 9 oz) (49 %, Z= -0 02)*   06/13/19 56 9 kg (125 lb 7 1 oz) (45 %, Z= -0 13)*   06/04/19 56 7 kg (125 lb) (44 %, Z= -0 16)*   05/31/19 56 9 kg (125 lb 7 1 oz) (45 %, Z= -0 13)*   03/07/19 57 4 kg (126 lb 8 7 oz) (48 %, Z= -0 06)*   01/09/19 59 2 kg (130 lb 8 2 oz) (56 %, Z= 0 14)* 12/11/18 59 9 kg (132 lb) (58 %, Z= 0 21)*     * Growth percentiles are based on CDC (Girls, 2-20 Years) data  Estimated body mass index is 23 5 kg/m² as calculated from the following:    Height as of 6/18/20: 5' 5" (1 651 m)  Weight as of this encounter: 64 kg (141 lb 3 2 oz)  Recent Weight Change: []Yes     [x]No  Amount:       Energy Needs: No calculation needed   Allergies   Allergen Reactions    Pineapple Anaphylaxis    Penicillins Hives and Vomiting       Social History     Substance and Sexual Activity   Alcohol Use Yes    Frequency: 2-4 times a month       Social History     Tobacco Use   Smoking Status Passive Smoke Exposure - Never Smoker   Smokeless Tobacco Never Used       Who shops? patient and mother   Who cooks? patient and mother   Exercise: Pt exercises daily   Prior Counseling? []Yes     [x]No  When:      Why:         Diet Hx:  Breakfast:  a m  Everything bagel with veggie cream cheese and black iced coffee   Lunch:  p m  Spinach wrap with luncheon meat, hot mustard and green peppers or leftovers with water or iced green tea     Dinner: p m  Pork/chicken/salmon with vegetable and potato       Snacks: Pickle flavored potato chips or fruit        Nutrition Diagnosis:   Food and nutrition related knowledge deficit  related to Lack of prior exposure to accurate nutrition related information as evidenced by [employer] inaccurate or incomplete information       Medical Nutrition Therapy Intervention:  []Individualized Meal Plan []Understanding Lab Values   []Basic Pathophysiology of Disease []Food/Medication Interactions   [x]Food Diary []Exercise   []Lifestyle/Behavior Modification Techniques []Medication, Mechanism of Action   []Label Reading []Self Blood Glucose Monitoring   [x]Weight/BMI Goals []Other -    Other Notes: Pt states she was diagnosed with gastroparesis at 11years old after bout with gastritis  She states that she lost a lot of weight at that time    She states that currently she has been able to maintain her weight  Pt states that she can have pain after eating or the feeling like a lead ball is in stomach or overall chest or stomach pain  She states that she has been avoiding dairy, raw fruits and veggies and is more conscious overall of what she eats  Provided her with a handout on gastroparesis and what to avoid  Also recommended eating several small meals per day as opposed to 3 large  Also discussed watching her fat and fiber intake  Also discussed watching her weight as if she can't eat enough due to feeling full, it might cause her to lose weight  If she starts to lose weight, suggested she consider adding shakes or [place] Instant Breakfast        Comprehension: []Excellent  [x]Very Good  []Good  []Fair   []Poor    Receptivity: []Excellent  [x]Very Good  []Good  []Fair   []Poor    Expected Compliance: []Excellent  [x]Very Good  []Good  []Fair   []Poor        Goals:  1  Journal intake daily for at least a month   2  Weigh self weekly or every other week to ensure weight maintainance   3  No follow-ups on file    Labs:  CMP  Lab Results   Component Value Date     01/04/2016    K 3 8 06/30/2020     06/30/2020    CO2 26 06/30/2020    ANIONGAP 9 01/04/2016    BUN 9 06/30/2020    CREATININE 0 78 06/30/2020    GLUCOSE 75 01/04/2016    GLUF 68 06/30/2020    CALCIUM 9 2 06/30/2020    AST 15 06/30/2020    ALT 16 06/30/2020    ALKPHOS 68 06/30/2020    PROT 8 3 (H) 01/04/2016    BILITOT 0 23 01/04/2016    EGFR 110 06/30/2020       BMP  Lab Results   Component Value Date    GLUCOSE 75 01/04/2016    CALCIUM 9 2 06/30/2020     01/04/2016    K 3 8 06/30/2020    CO2 26 06/30/2020     06/30/2020    BUN 9 06/30/2020    CREATININE 0 78 06/30/2020       Lipids  No results found for: CHOL  Lab Results   Component Value Date    HDL 69 06/30/2020    HDL 67 (H) 07/26/2019     Lab Results   Component Value Date    LDLCALC 147 (H) 06/30/2020    LDLCALC 97 07/26/2019     Lab Results   Component Value Date    TRIG 135 06/30/2020    TRIG 121 07/26/2019     No results found for: CHOLHDL    Hemoglobin A1C  Lab Results   Component Value Date    HGBA1C 5 1 06/30/2020       Fasting Glucose  Lab Results   Component Value Date    GLUF 68 06/30/2020       Insulin     Thyroid  No results found for: TSH, W6QSATC, W3EEYCS, THYROIDAB    Hepatic Function Panel  Lab Results   Component Value Date    ALT 16 06/30/2020    AST 15 06/30/2020    ALKPHOS 68 06/30/2020    BILITOT 0 23 01/04/2016       Celiac Disease Antibody Panel  GAMMAGLOBULIN; IGA   Date Value Ref Range Status   01/04/2016 185 77 - 278 mg/dL Final     Comment:     Performed at:  [facility]  [place] [address], [facility], [address] [place] [address]  [id number]  : Fuad Barton MD, Phone:  [phone]  The above 1 analytes were performed by [facility]  Account # [de-identified] [facility] Garth Aviles [id number]        Iron  Lab Results   Component Value Date    IRON 37 04/20/2017    FERRITIN 11 (L) 04/20/2017       Vitamins  No results found for: VITAMIN B2   No results found for: NICOTINAMIDE, NICOTINIC ACID   No results found for: Maine Eis  No results found for: Espinoza Bates  No results found for: VITB5  No results found for: G7SEFQGZ  No results found for: Hunter Watts  No results found for: VITAMIN K   No results found for: 25-HYDROXY VIT D   No components found for: VITAMINE     Erica Gan, MS, RDN, LDN  [address] [id number]

## 2020-07-13 DIAGNOSIS — F32.A DEPRESSIVE DISORDER: ICD-10-CM

## 2020-07-13 RX ORDER — ARIPIPRAZOLE 2 MG/1
2 TABLET ORAL DAILY
Qty: 90 TABLET | Refills: 0 | Status: SHIPPED | OUTPATIENT
Start: 2020-07-13 | End: 2020-10-04

## 2020-07-13 NOTE — TELEPHONE ENCOUNTER
Received a call from the patient stating that her pharmacy will not refill her prescription for Abilify 2mg tablet  She uses he [facility] pharmacy on [address] in [facility]

## 2020-07-13 NOTE — TELEPHONE ENCOUNTER
I spoke with the pharmacist at [facility] in [address] [place] [address]  He said the notes indicated it was the pharmacy in [place] that was working on this and I should follow up with them  I [facility]  The medication needs a refill  I spoke with Tenzin Desir to clarify where she needed the prescription to be sent  Pleas send to [facility] in [facility]  I verified her next office visit for 7/24/20  Tenzin Desir gets a notification when a prescription is ready and does not need an additional call if prescription renewed as previously prescribed

## 2020-07-21 DIAGNOSIS — Z30.41 USES ORAL CONTRACEPTION: ICD-10-CM

## 2020-07-21 RX ORDER — LEVONORGESTREL AND ETHINYL ESTRADIOL 0.15-0.03
KIT ORAL
Qty: 84 TABLET | Refills: 0 | Status: SHIPPED | OUTPATIENT
Start: 2020-07-21 | End: 2020-10-14

## 2020-07-24 ENCOUNTER — OFFICE VISIT (OUTPATIENT)
Dept: PSYCHIATRY | Facility: CLINIC | Age: 20
End: 2020-07-24
Payer: COMMERCIAL

## 2020-07-24 VITALS
WEIGHT: 141 LBS | HEIGHT: 65 IN | DIASTOLIC BLOOD PRESSURE: 77 MMHG | SYSTOLIC BLOOD PRESSURE: 115 MMHG | BODY MASS INDEX: 23.49 KG/M2 | HEART RATE: 105 BPM

## 2020-07-24 DIAGNOSIS — F32.A DEPRESSIVE DISORDER: ICD-10-CM

## 2020-07-24 DIAGNOSIS — F41.9 ANXIETY DISORDER, UNSPECIFIED TYPE: Primary | ICD-10-CM

## 2020-07-24 PROCEDURE — 3008F BODY MASS INDEX DOCD: CPT | Performed by: FAMILY MEDICINE

## 2020-07-24 PROCEDURE — 1036F TOBACCO NON-USER: CPT | Performed by: STUDENT IN AN ORGANIZED HEALTH CARE EDUCATION/TRAINING PROGRAM

## 2020-07-24 PROCEDURE — 90833 PSYTX W PT W E/M 30 MIN: CPT | Performed by: STUDENT IN AN ORGANIZED HEALTH CARE EDUCATION/TRAINING PROGRAM

## 2020-07-24 PROCEDURE — 3008F BODY MASS INDEX DOCD: CPT | Performed by: STUDENT IN AN ORGANIZED HEALTH CARE EDUCATION/TRAINING PROGRAM

## 2020-07-24 PROCEDURE — 99213 OFFICE O/P EST LOW 20 MIN: CPT | Performed by: STUDENT IN AN ORGANIZED HEALTH CARE EDUCATION/TRAINING PROGRAM

## 2020-07-24 NOTE — Clinical Note
Met with Dannie Encarnacion today  She is doing well but has gained a little weight and elevated lipids since starting Abilify in 5/2019  She was asking if a statin was warranted, told her I'd defer to you, looks like the lipids probably can just be monitored with lifestyle changes at this point  Let her know if you feel differently  Will re-check the labs in 12/2020  Thanks

## 2020-07-24 NOTE — PSYCH
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), no significant PPH, no past psychiatric hospitalizations, no past suicide attempts, no h/o self-injurious behaviors, no h/o physical altercations, PMH significant for h/o lyme disease, gastritis, h/o esophageal candidiasis, no active substance use, presents to [facility] outpatient clinic on referral from gastroenterologist for psychosomatic contributions to chronic abdominal pain with patient reporting "I need help controlling the pain" with father reporting "she's had stress and anxiety "      On problem-focused interview:  1  Somatic Symptoms- She reports that she saw a dietician recently regarding her diet  She reports that she may have gained weight  She continues to have issues with gastroparesis        2  Mood/Anxiety- Patient reports spending most of her time studying for the [employer], reports that she took it a few days ago  She reports that the spring semester went well, was an adjustment to the virtual classes  She reports that she took some classes over the summer  She reports her grades were in the A's and B's range  She reports some weight gain  She reports her mood has been "a lot better "  She reports her irritability has been better, reports can be on edge when she is stressed  She reports using the medical cannabis to help her to sleep, reports that she sleeps 8-9 hours per night  She reports that she has been painting a lot  She reports her interest have been about the same  She reports that she has energy level lowers in the afternoon, usually normal energy levels    She reports her focus could be better, feels distractible at times  She reports that she has eating problems, has gastroparesis  She reports that she has been a bit more anxious recently, feels it may have been due to recent MCAT exam, rates her anxiety about 8/10 intensity on most day, has been lower since taking the MCAT  She reports occasional whispers but tolerating them okay  She reports that she had one panic attack recently  She reports spending time with friends  She denies working over the summer  Medication helping with symptoms, academic stressors is main exacerbating factor  Patient reports her relationship is going well  Medication helping with symptom, academic stressors is main exacerbating factor  Review Of Systems:     Constitutional Negative   ENT Negative   Cardiovascular Negative   Respiratory Negative   Gastrointestinal Abdominal Pain   Genitourinary Negative   Musculoskeletal Negative   Integumentary Negative   Neurological Negative   Endocrine Negative     Past Medical History:   Patient Active Problem List   Diagnosis    Anxiety disorder    Headache, migraine    Irregular menses    Rapid or irregular heartbeat    Fatigue    Depressive disorder    Gastroparesis    Gastroesophageal reflux disease    Chest pain    Annual physical exam    Need for HPV vaccination       Allergies: Allergies   Allergen Reactions    Pineapple Anaphylaxis    Penicillins Hives and Vomiting       Past Surgical History:   Past Surgical History:   Procedure Laterality Date    ESOPHAGOGASTRODUODENOSCOPY      EGD with bipsy       Past Psychiatric History:  No significant PPH, no past psychiatric hospitalizations, no past suicide attempts, no h/o self-injurious behaviors, no h/o physical altercations       Past Med Trials: Fluoxetine up to 40 mg daily (ineffective), Seroquel XR up to 150 mg qhs (drowsiness)      Family Psychiatric History:   Father- Bipolar II d/o (on Prozac, Oxcarbazepine)  Pat   Great Grandmother- Schizophrenia  Mother- Panic Attacks (Xanax, previously on Zoloft)  Mat  Grandmother- Anxiety  Maternal Side- Chronic abdominal pain     No FH of suicide      Social History:   Domiciled with parents, brother (15 y/o) in [place]  Mother employed in [employer], father employed as pharmaceutical consultant  Reports infrequent caffeine use  No active substance use  Continues to be with boyfriend of about 3 years        Substance Abuse History:   Alcohol- drinking occasionally on weekends, a couple of drinks, about once per week, limited alcohol use  Cannabis- occasional use, about once per month, currently using medical marijuana     Traumatic History: Denies any h/o physical or sexual abuse    The following portions of the patient's history were reviewed and updated as appropriate: allergies, current medications, past family history, past medical history, past social history, past surgical history and problem list     Objective:  Vitals:    07/24/20 1457   BP: 115/77   Pulse: 105     Height: 5' 4 5" (163 8 cm)   Weight (last 2 days)     Date/Time   Weight    07/24/20 1457   64 (141)              Mental status:  Appearance sitting comfortably in chair, dressed in casual clothing, adequate hygiene and grooming, cooperative with interview, fairly well related   Mood "a lot better"   Affect Appears generally euthymic, stable, mood-congruent   Speech Normal rate, rhythm, and volume   Thought Processes Linear and goal directed   Associations intact associations   Hallucinations Some whispers at times   Thought Content No passive or active suicidal or homicidal ideation, intent, or plan     Orientation Oriented to person, place, time, and situation   Recent and Remote Memory Grossly intact   Attention Span and Concentration Concentration intact   Intellect Appears to have above average Intelligence   Insight Insight intact   Judgement judgment was intact   Muscle Strength Muscle strength and tone were normal   Language Within normal limits   Fund of Knowledge Age appropriate   Pain None     Assessment/Plan:       Diagnoses and all orders for this visit:    Anxiety disorder, unspecified type    Depressive disorder          [age] y/o Female, domiciled with parents, brother (15 y/o) in [address] [place] [address] (majoring in microbiology), no significant PPH, no past psychiatric hospitalizations, no past suicide attempts, no h/o self-injurious behaviors, no h/o physical altercations, PMH significant for h/o lyme disease, gastritis, h/o esophageal candidiasis, no active substance use, presents to [place] [id number] outpatient clinic on referral from gastroenterologist for psychosomatic contributions to chronic abdominal pain with patient reporting "I need help controlling the pain" with father reporting "she's had stress and anxiety "      On assessment today, patient with continued stability of mood symptoms with mild depressive symptoms, mild-moderate anxiety, doing well academically, recently taking MCAT which caused some increased stress, in psychosocial context of strong FH of mental health problems, living far away from home   No current passive or active SI, intent, or plan  Currently, patient is not an imminent risk of harm to self or others and is appropriate for outpatient level of care at this time      Plan:  1  Depression, Anxiety, Chronic Abdominal Pain- Will continue Zoloft 100 mg daily for mood and anxiety symptoms   Will continue Abilify 2 mg daily to help with augmentation treatment for depression, perceptual disturbances   Continue Hydroxyzine up to 50 mg qhs prn insomnia  Randolm [place] continue Ativan up to 1 mg daily prn severe anxiety or panic attacks  PHQ-A score of 12, moderate depression, (7/24/20), JADEN-7 score of 14, moderate anxiety symptoms (7/24/20)  2  Medical- Encouraged to obtain metabolic labwork (next labwork in 12/2020)   Continue to f/u with GI doctors for management of abdominal pain   F/u with PCP for on-going medical issues  3  F/u with this provider in 1 month    Risks, Benefits And Possible Side Effects Of Medications:  Risks, benefits, and possible side effects of medications explained to patient and family, they verbalize understanding and Reviewed risks/benefits and side effects of antidepressant medications including black box warning on antidepressants, patient and family verbalize understanding  Psychotherapy Provided: Supportive psychotherapy provided  Counseling was provided during the session today for 16 minutes  Medications, treatment progress and treatment plan reviewed with [facility]  Recent stressor including family issues, school stress, social difficulties, everyday stressors and occasional anxiety discussed with Arnaud Donato  Coping strategies including getting into a good routine, improving self-esteem, maintain healthy diet, maintain heathy sleeping hygiene, relaxation, spending time with family and spending time with friends reviewed with [facility]  Reassurance and supportive therapy provided  I spent 30 minutes directly with the patient during this visit    VIRTUAL VISIT DISCLAIMER    Julia Nolasco acknowledges that she has consented to an online visit or consultation  She understands that the online visit is based solely on information provided by her, and that, in the absence of a face-to-face physical evaluation by the physician, the diagnosis she receives is both limited and provisional in terms of accuracy and completeness  This is not intended to replace a full medical face-to-face evaluation by the physician  Julia Nolasco understands and accepts these terms

## 2020-07-27 NOTE — PROGRESS NOTES
Please inform the patient she can do lifestyle modification low-fat diet exercise and she does not need statins, her labs should be repeated in few months and her doctor is planning to do that as they are monitoring her lipid

## 2020-10-04 DIAGNOSIS — F32.A DEPRESSIVE DISORDER: ICD-10-CM

## 2020-10-04 RX ORDER — ARIPIPRAZOLE 2 MG/1
TABLET ORAL
Qty: 90 TABLET | Refills: 0 | Status: SHIPPED | OUTPATIENT
Start: 2020-10-04 | End: 2020-12-22 | Stop reason: SDUPTHER

## 2020-10-14 DIAGNOSIS — Z30.41 USES ORAL CONTRACEPTION: ICD-10-CM

## 2020-10-14 RX ORDER — LEVONORGESTREL AND ETHINYL ESTRADIOL 0.15-0.03
KIT ORAL
Qty: 84 TABLET | Refills: 0 | Status: SHIPPED | OUTPATIENT
Start: 2020-10-14 | End: 2021-01-25

## 2020-10-20 DIAGNOSIS — F32.A DEPRESSIVE DISORDER: ICD-10-CM

## 2020-10-20 DIAGNOSIS — F41.9 ANXIETY DISORDER, UNSPECIFIED TYPE: ICD-10-CM

## 2020-10-20 RX ORDER — SERTRALINE HYDROCHLORIDE 100 MG/1
100 TABLET, FILM COATED ORAL DAILY
Qty: 90 TABLET | Refills: 0 | Status: SHIPPED | OUTPATIENT
Start: 2020-10-20 | End: 2020-12-22 | Stop reason: SDUPTHER

## 2020-12-22 ENCOUNTER — TELEMEDICINE (OUTPATIENT)
Dept: PSYCHIATRY | Facility: CLINIC | Age: 20
End: 2020-12-22
Payer: COMMERCIAL

## 2020-12-22 ENCOUNTER — TELEPHONE (OUTPATIENT)
Dept: PSYCHIATRY | Facility: CLINIC | Age: 20
End: 2020-12-22

## 2020-12-22 DIAGNOSIS — F32.A DEPRESSIVE DISORDER: ICD-10-CM

## 2020-12-22 DIAGNOSIS — F41.9 ANXIETY DISORDER, UNSPECIFIED TYPE: Primary | ICD-10-CM

## 2020-12-22 PROCEDURE — 99214 OFFICE O/P EST MOD 30 MIN: CPT | Performed by: STUDENT IN AN ORGANIZED HEALTH CARE EDUCATION/TRAINING PROGRAM

## 2020-12-22 PROCEDURE — 90833 PSYTX W PT W E/M 30 MIN: CPT | Performed by: STUDENT IN AN ORGANIZED HEALTH CARE EDUCATION/TRAINING PROGRAM

## 2020-12-22 RX ORDER — ARIPIPRAZOLE 2 MG/1
2 TABLET ORAL DAILY
Qty: 90 TABLET | Refills: 1 | Status: SHIPPED | OUTPATIENT
Start: 2020-12-22 | End: 2021-07-21

## 2020-12-22 RX ORDER — SERTRALINE HYDROCHLORIDE 25 MG/1
25 TABLET, FILM COATED ORAL DAILY
Qty: 90 TABLET | Refills: 1 | Status: SHIPPED | OUTPATIENT
Start: 2020-12-22 | End: 2021-07-21

## 2020-12-22 RX ORDER — SERTRALINE HYDROCHLORIDE 100 MG/1
100 TABLET, FILM COATED ORAL DAILY
Qty: 90 TABLET | Refills: 1 | Status: SHIPPED | OUTPATIENT
Start: 2020-12-22 | End: 2021-07-21

## 2021-01-24 DIAGNOSIS — Z30.41 USES ORAL CONTRACEPTION: ICD-10-CM

## 2021-01-25 RX ORDER — LEVONORGESTREL AND ETHINYL ESTRADIOL 0.15-0.03
KIT ORAL
Qty: 84 TABLET | Refills: 0 | Status: SHIPPED | OUTPATIENT
Start: 2021-01-25 | End: 2021-04-13

## 2021-04-13 DIAGNOSIS — Z30.41 USES ORAL CONTRACEPTION: ICD-10-CM

## 2021-04-13 RX ORDER — LEVONORGESTREL AND ETHINYL ESTRADIOL 0.15-0.03
KIT ORAL
Qty: 84 TABLET | Refills: 0 | Status: SHIPPED | OUTPATIENT
Start: 2021-04-13 | End: 2021-06-29

## 2021-04-22 DIAGNOSIS — Z30.41 USES ORAL CONTRACEPTION: ICD-10-CM

## 2021-04-22 RX ORDER — LEVONORGESTREL AND ETHINYL ESTRADIOL 0.15-0.03
1 KIT ORAL DAILY
Qty: 84 TABLET | Refills: 0 | OUTPATIENT
Start: 2021-04-22

## 2021-04-22 NOTE — TELEPHONE ENCOUNTER
FYI  this was filled on 04/13/2021 by you,  This patient has a different PCP and had her cpx with them on 06/18/2020  She has not seen us since 12/2018

## 2021-04-30 ENCOUNTER — TELEPHONE (OUTPATIENT)
Dept: GASTROENTEROLOGY | Facility: AMBULARY SURGERY CENTER | Age: 21
End: 2021-04-30

## 2021-04-30 NOTE — TELEPHONE ENCOUNTER
Regarding: Non-Urgent Medical Question  ----- Message from Adrienne Mae sent at 4/30/2021  1:07 PM EDT -----       ----- Message from Romi sow [facility], MD sent at 4/22/2021  2:25 PM -----   Hello,  I am currently filling out a scholarship for my graduate studies and it is from [employer]  They are asking for my gastro to fill out this form to prove I have been diagnosed with a GI disorder  Is there any way you can fill this out in the next week and send it back so I can finish applying for this scholarship? Thank you!

## 2021-04-30 NOTE — TELEPHONE ENCOUNTER
Returned pts call  [facility] [address] [facility] [address]  [facility] [id number] per pt

## 2021-06-29 ENCOUNTER — ANNUAL EXAM (OUTPATIENT)
Dept: OBGYN CLINIC | Facility: CLINIC | Age: 21
End: 2021-06-29
Payer: COMMERCIAL

## 2021-06-29 VITALS
SYSTOLIC BLOOD PRESSURE: 120 MMHG | DIASTOLIC BLOOD PRESSURE: 70 MMHG | WEIGHT: 144 LBS | BODY MASS INDEX: 23.99 KG/M2 | HEIGHT: 65 IN

## 2021-06-29 DIAGNOSIS — Z01.419 WELL FEMALE EXAM WITH ROUTINE GYNECOLOGICAL EXAM: Primary | ICD-10-CM

## 2021-06-29 DIAGNOSIS — Z11.3 SCREENING FOR STDS (SEXUALLY TRANSMITTED DISEASES): ICD-10-CM

## 2021-06-29 DIAGNOSIS — Z23 NEED FOR HPV VACCINE: ICD-10-CM

## 2021-06-29 DIAGNOSIS — Z30.41 USES ORAL CONTRACEPTION: ICD-10-CM

## 2021-06-29 PROCEDURE — G0145 SCR C/V CYTO,THINLAYER,RESCR: HCPCS | Performed by: OBSTETRICS & GYNECOLOGY

## 2021-06-29 PROCEDURE — 87491 CHLMYD TRACH DNA AMP PROBE: CPT | Performed by: OBSTETRICS & GYNECOLOGY

## 2021-06-29 PROCEDURE — 87591 N.GONORRHOEAE DNA AMP PROB: CPT | Performed by: OBSTETRICS & GYNECOLOGY

## 2021-06-29 PROCEDURE — 90471 IMMUNIZATION ADMIN: CPT | Performed by: OBSTETRICS & GYNECOLOGY

## 2021-06-29 PROCEDURE — 90651 9VHPV VACCINE 2/3 DOSE IM: CPT | Performed by: OBSTETRICS & GYNECOLOGY

## 2021-06-29 PROCEDURE — 99385 PREV VISIT NEW AGE 18-39: CPT | Performed by: OBSTETRICS & GYNECOLOGY

## 2021-06-29 RX ORDER — LEVONORGESTREL AND ETHINYL ESTRADIOL 0.15-0.03
1 KIT ORAL DAILY
Qty: 84 TABLET | Refills: 3 | Status: SHIPPED | OUTPATIENT
Start: 2021-06-29 | End: 2022-06-02

## 2021-07-01 NOTE — PROGRESS NOTES
ASSESSMENT & PLAN: Aarti Diaz is a 23 y o  Yoegsh Govea with normal gynecologic exam     1   Routine well woman exam done today  2   Pap  was done today  Current ASCCP Guidelines reviewed  3   The patient accepted STD testing  chlamydia and gonorrhea testing performed  Safe sex practices have been discussed  4   Gardasil is recommended for patients from 526 years of age  The patient has had the Gardasil vaccine series x2, third was given today  5  The patient is sexually active  She uses OCPs for contraception and options have been discussed  6  The following were reviewed in today's visit: breast self exam, STD testing, use and side effects of OCPs, adequate intake of calcium and vitamin D, exercise and healthy diet  7  Patient to return to office in 12 months for annual      All questions have been answered to her satisfaction  CC:  Annual Gynecologic Examination    HPI: Aarti Diaz is a 23 y o  Yogesh Govea who presents for annual gynecologic examination  She has the following concerns:  none    Health Maintenance:    She exercises 3 days per week  She wears her seatbelt routinely  She does perform irregular monthly self breast exams  She feels safe at home  Patients does follow a reg diet  Past Medical History:   Diagnosis Date    Anemia     Anxiety     Depression     Exertional shortness of breath     Resolved 2015     Gastritis     Gastroparesis     Resolved 10/21/2016     Lyme disease     Syncope     Resolved 2015        Past Surgical History:   Procedure Laterality Date    ESOPHAGOGASTRODUODENOSCOPY      EGD with bipsy       Past OB/Gyn History:  Period Cycle (Days): 30  Period Duration (Days): 4-5  Period Pattern: Regular  Menstrual Flow: Moderate, Light  Dysmenorrhea: (!) Moderate  Dysmenorrhea Symptoms: Cramping, NauseaPatient's last menstrual period was 2021    Menstrual History:  OB History        0    Para   0    Term   0       0    AB 0    Living   0       SAB   0    TAB   0    Ectopic   0    Multiple   0    Live Births   0               History of sexually transmitted infection No  Patient is currently sexually active  heterosexual Birth control: combination OCPs  Family History   Problem Relation Age of Onset    Anxiety disorder Mother     Bipolar disorder Father     Sleep disorder Father     Anxiety disorder Maternal Grandmother     Hyperlipidemia Maternal Grandmother     Hypertension Maternal Grandmother     Thyroid disease Maternal Grandmother     Kidney disease Paternal Grandmother     Lung cancer Maternal Grandfather     Prostate cancer Paternal Grandfather     Substance Abuse Neg Hx        Social History:  Social History     Socioeconomic History    Marital status: Single     Spouse name: Not on file    Number of children: Not on file    Years of education: Not on file    Highest education level: Not on file   Occupational History    Not on file   Tobacco Use    Smoking status: Never Smoker    Smokeless tobacco: Never Used   Vaping Use    Vaping Use: Never used   Substance and Sexual Activity    Alcohol use: Yes     Comment: social    Drug use: Yes     Types: Marijuana     Comment: medical marijuana    Sexual activity: Yes     Partners: Male     Birth control/protection: OCP   Other Topics Concern    Not on file   Social History Narrative    Caffeine use    Currently in college    Poor dental hygiene    Tea      Social Determinants of Health     Financial Resource Strain:     Difficulty of Paying Living Expenses:    Food Insecurity:     Worried About Running Out of Food in the Last Year:     Ran Out of Food in the Last Year:    Transportation Needs:     Lack of Transportation (Medical):      Lack of Transportation (Non-Medical):    Physical Activity:     Days of Exercise per Week:     Minutes of Exercise per Session:    Stress:     Feeling of Stress :    Social Connections:     Frequency of Communication with Friends and Family:     Frequency of Social Gatherings with Friends and Family:     Attends Muslim Services:     Active Member of Clubs or Organizations:     Attends Club or Organization Meetings:     Marital Status:    Intimate Partner Violence:     Fear of Current or Ex-Partner:     Emotionally Abused:     Physically Abused:     Sexually Abused:      Presently lives with her family  Patient is single  Patient is currently employed   Allergies   Allergen Reactions    Pineapple - Food Allergy Anaphylaxis    Penicillins Hives and Vomiting       Current Outpatient Medications:     ARIPiprazole (ABILIFY) 2 mg tablet, Take 1 tablet (2 mg total) by mouth daily, Disp: 90 tablet, Rfl: 1    levonorgestrel-ethinyl estradiol (Marlissa) 0 15-30 MG-MCG per tablet, Take 1 tablet by mouth daily, Disp: 84 tablet, Rfl: 3    LORazepam (ATIVAN) 1 mg tablet, Take 1 tablet (1 mg total) by mouth daily as needed for anxiety, Disp: 30 tablet, Rfl: 2    sertraline (ZOLOFT) 100 mg tablet, Take 1 tablet (100 mg total) by mouth daily, Disp: 90 tablet, Rfl: 1    sertraline (ZOLOFT) 25 mg tablet, Take 1 tablet (25 mg total) by mouth daily, Disp: 90 tablet, Rfl: 1    Review of Systems:  Review of Systems   Constitutional: Negative  HENT: Negative  Respiratory: Negative  Cardiovascular: Negative  Gastrointestinal: Negative  Genitourinary: Negative  Neurological: Negative  Psychiatric/Behavioral: Negative  Physical Exam:  /70   Ht 5' 4 5" (1 638 m)   Wt 65 3 kg (144 lb)   LMP 06/13/2021   BMI 24 34 kg/m²    Physical Exam  Constitutional:       Appearance: Normal appearance  She is normal weight  Genitourinary:      Vulva, urethra, bladder, vagina, cervix, uterus, right adnexa and left adnexa normal       No vulval tenderness or Bartholin's cyst noted  No signs of labial injury  Vaginal rugosity present  No vaginal discharge, tenderness or bleeding  Cervix is nulliparous  Cervical pinkness present  No cervical polyp  Uterus is mobile  Uterus is not enlarged or tender  HENT:      Head: Normocephalic and atraumatic  Eyes:      Extraocular Movements: Extraocular movements intact  Conjunctiva/sclera: Conjunctivae normal       Pupils: Pupils are equal, round, and reactive to light  Cardiovascular:      Rate and Rhythm: Normal rate and regular rhythm  Heart sounds: Normal heart sounds  No murmur heard  Pulmonary:      Effort: Pulmonary effort is normal  No respiratory distress  Breath sounds: Normal breath sounds  No wheezing or rales  Chest:      Breasts:         Right: Normal          Left: Normal    Abdominal:      General: Abdomen is flat  There is no distension  Palpations: Abdomen is soft  Tenderness: There is no abdominal tenderness  There is no guarding  Neurological:      General: No focal deficit present  Mental Status: She is alert and oriented to person, place, and time  Skin:     General: Skin is warm and dry  Vitals and nursing note reviewed

## 2021-07-05 LAB
C TRACH DNA SPEC QL NAA+PROBE: NEGATIVE
N GONORRHOEA DNA SPEC QL NAA+PROBE: NEGATIVE

## 2021-07-06 ENCOUNTER — OFFICE VISIT (OUTPATIENT)
Dept: GASTROENTEROLOGY | Facility: CLINIC | Age: 21
End: 2021-07-06
Payer: COMMERCIAL

## 2021-07-06 VITALS
BODY MASS INDEX: 24.32 KG/M2 | HEART RATE: 91 BPM | DIASTOLIC BLOOD PRESSURE: 73 MMHG | SYSTOLIC BLOOD PRESSURE: 104 MMHG | WEIGHT: 146 LBS | HEIGHT: 65 IN

## 2021-07-06 DIAGNOSIS — R10.84 GENERALIZED ABDOMINAL PAIN: ICD-10-CM

## 2021-07-06 DIAGNOSIS — K31.84 GASTROPARESIS: Primary | ICD-10-CM

## 2021-07-06 PROCEDURE — 99214 OFFICE O/P EST MOD 30 MIN: CPT | Performed by: INTERNAL MEDICINE

## 2021-07-06 PROCEDURE — 1036F TOBACCO NON-USER: CPT | Performed by: INTERNAL MEDICINE

## 2021-07-06 PROCEDURE — 3008F BODY MASS INDEX DOCD: CPT | Performed by: INTERNAL MEDICINE

## 2021-07-06 RX ORDER — PANTOPRAZOLE SODIUM 20 MG/1
20 TABLET, DELAYED RELEASE ORAL DAILY
Qty: 30 TABLET | Refills: 3 | Status: SHIPPED | OUTPATIENT
Start: 2021-07-06 | End: 2021-08-06 | Stop reason: HOSPADM

## 2021-07-06 NOTE — PROGRESS NOTES
[facility] - Outpatient Follow-up Note  Job Vielka 23 y o  female MRN: [id number]  Encounter: [id number]          ASSESSMENT AND PLAN:      1  Gastroparesis  2  Generalized abdominal pain    Unclear if this is the entire cause of her current symptoms, though she feels that it is  Differential diagnosis would include peptic ulcer disease, functional dyspepsia, gastritis, small intestinal bacterial overgrowth, etc     - EGD; Future  - CBC; Future  - Comprehensive metabolic panel; Future  - Lipase; Future  - pantoprazole (PROTONIX) 20 mg tablet; Take 1 tablet (20 mg total) by mouth daily  Dispense: 30 tablet; Refill: 3    ______________________________________________________________________    SUBJECTIVE:   Patient presents with history of gastroparesis diagnosed on gastric emptying scan after an episode of acute gastroenteritis 5 years ago  She has had worsening symptoms in recent months comprising vomiting approximately 30 minutes or so after any meal    Does not vomit food eaten hours before, however  She  Had been following a gastroparesis diet but had strayed away from this to some degree  She has chronic, primarily lower abdominal pain which is intermittent but has been present since her diagnosis and is essentially unchanged  REVIEW OF SYSTEMS:    Review of Systems   Constitutional: Positive for weight gain  Negative for chills, decreased appetite, fever, malaise/fatigue and night sweats  Gastrointestinal: Positive for bloating, abdominal pain, change in bowel habit, diarrhea, nausea and vomiting  Negative for anorexia, dysphagia and jaundice            Historical Information   Past Medical History:   Diagnosis Date    Anemia     Anxiety     Depression     Exertional shortness of breath     Resolved 12/23/2015     Gastritis     Gastroparesis     Resolved 10/21/2016     GERD (gastroesophageal reflux disease)     Lyme disease     Syncope     Resolved 11/18/2015 Past Surgical History:   Procedure Laterality Date    ESOPHAGOGASTRODUODENOSCOPY      EGD with bipsy    UPPER GASTROINTESTINAL ENDOSCOPY       Social History   Social History     Substance and Sexual Activity   Alcohol Use Yes    Comment: social     Social History     Substance and Sexual Activity   Drug Use Yes    Types: Marijuana    Comment: medical marijuana     Social History     Tobacco Use   Smoking Status Never Smoker   Smokeless Tobacco Never Used   Tobacco Comment    medical marijuana     Family History   Problem Relation Age of Onset    Anxiety disorder Mother     Bipolar disorder Father     Sleep disorder Father     Anxiety disorder Maternal Grandmother     Hyperlipidemia Maternal Grandmother     Hypertension Maternal Grandmother     Thyroid disease Maternal Grandmother     Kidney disease Paternal Grandmother     Lung cancer Maternal Grandfather     Prostate cancer Paternal Grandfather     Substance Abuse Neg Hx        Meds/Allergies       Current Outpatient Medications:     ARIPiprazole (ABILIFY) 2 mg tablet    levonorgestrel-ethinyl estradiol (Marlissa) 0 15-30 MG-MCG per tablet    LORazepam (ATIVAN) 1 mg tablet    sertraline (ZOLOFT) 100 mg tablet    sertraline (ZOLOFT) 25 mg tablet    pantoprazole (PROTONIX) 20 mg tablet    Allergies   Allergen Reactions    Pineapple - Food Allergy Anaphylaxis    Penicillins Hives and Vomiting           Objective     Blood pressure 104/73, pulse 91, height 5' 5" (1 651 m), weight 66 2 kg (146 lb), last menstrual period 06/13/2021  Body mass index is 24 3 kg/m²  PHYSICAL EXAM:      Physical Exam  Vitals and nursing note reviewed  Constitutional:       General: She is not in acute distress  Appearance: She is not ill-appearing  HENT:      Head: Normocephalic and atraumatic  Eyes:      General: No scleral icterus  Extraocular Movements: Extraocular movements intact     Cardiovascular:      Rate and Rhythm: Normal rate and regular rhythm  Pulmonary:      Effort: Pulmonary effort is normal  No respiratory distress  Abdominal:      General: There is no distension  Palpations: Abdomen is soft  Tenderness: There is abdominal tenderness  There is no guarding or rebound  Comments: Mild tenderness in the lower abdomen right greater than left  No significant pain at McBurney's point   Skin:     General: Skin is warm and dry  Coloration: Skin is not cyanotic  Findings: No erythema  Neurological:      General: No focal deficit present  Mental Status: She is alert and oriented to person, place, and time  Psychiatric:         Mood and Affect: Mood normal          Behavior: Behavior normal           Lab Results:   No visits with results within 1 Day(s) from this visit  Latest known visit with results is:   Annual Exam on 06/29/2021   Component Date Value    N gonorrhoeae, DNA Probe 06/29/2021 Negative     Chlamydia trachomatis, D* 06/29/2021 Negative          Radiology Results:   No results found

## 2021-07-09 LAB
LAB AP GYN PRIMARY INTERPRETATION: NORMAL
Lab: NORMAL
PATH INTERP SPEC-IMP: NORMAL

## 2021-07-21 DIAGNOSIS — F41.9 ANXIETY DISORDER, UNSPECIFIED TYPE: ICD-10-CM

## 2021-07-21 DIAGNOSIS — F32.A DEPRESSIVE DISORDER: ICD-10-CM

## 2021-07-21 RX ORDER — SERTRALINE HYDROCHLORIDE 25 MG/1
TABLET, FILM COATED ORAL
Qty: 90 TABLET | Refills: 1 | Status: SHIPPED | OUTPATIENT
Start: 2021-07-21 | End: 2021-11-11 | Stop reason: SDUPTHER

## 2021-07-21 RX ORDER — SERTRALINE HYDROCHLORIDE 100 MG/1
TABLET, FILM COATED ORAL
Qty: 90 TABLET | Refills: 1 | Status: SHIPPED | OUTPATIENT
Start: 2021-07-21 | End: 2021-11-11 | Stop reason: SDUPTHER

## 2021-07-21 RX ORDER — ARIPIPRAZOLE 2 MG/1
TABLET ORAL
Qty: 90 TABLET | Refills: 1 | Status: SHIPPED | OUTPATIENT
Start: 2021-07-21 | End: 2022-03-11 | Stop reason: SDUPTHER

## 2021-08-06 ENCOUNTER — ANESTHESIA EVENT (OUTPATIENT)
Dept: GASTROENTEROLOGY | Facility: AMBULATORY SURGERY CENTER | Age: 21
End: 2021-08-06

## 2021-08-06 ENCOUNTER — HOSPITAL ENCOUNTER (OUTPATIENT)
Dept: GASTROENTEROLOGY | Facility: AMBULATORY SURGERY CENTER | Age: 21
Discharge: HOME | End: 2021-08-06
Payer: COMMERCIAL

## 2021-08-06 ENCOUNTER — ANESTHESIA (OUTPATIENT)
Dept: GASTROENTEROLOGY | Facility: AMBULATORY SURGERY CENTER | Age: 21
End: 2021-08-06

## 2021-08-06 VITALS
HEIGHT: 65 IN | OXYGEN SATURATION: 100 % | DIASTOLIC BLOOD PRESSURE: 77 MMHG | WEIGHT: 142 LBS | TEMPERATURE: 98 F | RESPIRATION RATE: 18 BRPM | BODY MASS INDEX: 23.66 KG/M2 | SYSTOLIC BLOOD PRESSURE: 107 MMHG | HEART RATE: 88 BPM

## 2021-08-06 DIAGNOSIS — K31.84 GASTROPARESIS: ICD-10-CM

## 2021-08-06 LAB
EXT PREGNANCY TEST URINE: NEGATIVE
EXT. CONTROL: NORMAL

## 2021-08-06 PROCEDURE — 43239 EGD BIOPSY SINGLE/MULTIPLE: CPT | Performed by: INTERNAL MEDICINE

## 2021-08-06 PROCEDURE — 00731 ANES UPR GI NDSC PX NOS: CPT | Performed by: NURSE ANESTHETIST, CERTIFIED REGISTERED

## 2021-08-06 RX ORDER — SODIUM CHLORIDE 9 MG/ML
20 INJECTION, SOLUTION INTRAVENOUS CONTINUOUS
Status: DISCONTINUED | OUTPATIENT
Start: 2021-08-06 | End: 2021-08-10 | Stop reason: HOSPADM

## 2021-08-06 RX ORDER — LIDOCAINE HYDROCHLORIDE 10 MG/ML
INJECTION, SOLUTION EPIDURAL; INFILTRATION; INTRACAUDAL; PERINEURAL AS NEEDED
Status: DISCONTINUED | OUTPATIENT
Start: 2021-08-06 | End: 2021-08-06

## 2021-08-06 RX ORDER — SODIUM CHLORIDE 9 MG/ML
INJECTION, SOLUTION INTRAVENOUS CONTINUOUS PRN
Status: DISCONTINUED | OUTPATIENT
Start: 2021-08-06 | End: 2021-08-06

## 2021-08-06 RX ORDER — SODIUM CHLORIDE 9 MG/ML
30 INJECTION, SOLUTION INTRAVENOUS CONTINUOUS
Status: DISCONTINUED | OUTPATIENT
Start: 2021-08-06 | End: 2021-08-10 | Stop reason: HOSPADM

## 2021-08-06 RX ORDER — PROPOFOL 10 MG/ML
INJECTION, EMULSION INTRAVENOUS AS NEEDED
Status: DISCONTINUED | OUTPATIENT
Start: 2021-08-06 | End: 2021-08-06

## 2021-08-06 RX ADMIN — PROPOFOL 150 MG: 10 INJECTION, EMULSION INTRAVENOUS at 11:47

## 2021-08-06 RX ADMIN — PROPOFOL 150 MG: 10 INJECTION, EMULSION INTRAVENOUS at 11:52

## 2021-08-06 RX ADMIN — SODIUM CHLORIDE: 9 INJECTION, SOLUTION INTRAVENOUS at 11:47

## 2021-08-06 RX ADMIN — LIDOCAINE HYDROCHLORIDE 50 MG: 10 INJECTION, SOLUTION EPIDURAL; INFILTRATION; INTRACAUDAL; PERINEURAL at 11:47

## 2021-08-06 NOTE — H&P
History and Physical - [facility]  Damien Lozoya 24 y o  female MRN: [id number]                  HPI: Damien Lozoya is a 24y o  year old female who presents for EGD for evaluation of abdominal pain, nausea, history of gastroparesis      REVIEW OF SYSTEMS: Per the HPI, and otherwise unremarkable      Historical Information   Past Medical History:   Diagnosis Date    Anemia     Anxiety     Asthma     hx of as child - outgrew     Depression     Exertional shortness of breath     Resolved 12/23/2015     Gastritis     Gastroparesis     Resolved 10/21/2016     GERD (gastroesophageal reflux disease)     Lyme disease     Syncope     Resolved 11/18/2015      Past Surgical History:   Procedure Laterality Date    ESOPHAGOGASTRODUODENOSCOPY      EGD with bipsy    UPPER GASTROINTESTINAL ENDOSCOPY       Social History   Social History     Substance and Sexual Activity   Alcohol Use Yes    Comment: social     Social History     Substance and Sexual Activity   Drug Use Yes    Types: Marijuana    Comment: medical marijuana - smokes      Social History     Tobacco Use   Smoking Status Never Smoker   Smokeless Tobacco Never Used   Tobacco Comment    medical marijuana     Family History   Problem Relation Age of Onset    Anxiety disorder Mother     Bipolar disorder Father     Sleep disorder Father     Anxiety disorder Maternal Grandmother     Hyperlipidemia Maternal Grandmother     Hypertension Maternal Grandmother     Thyroid disease Maternal Grandmother     Kidney disease Paternal Grandmother     Lung cancer Maternal Grandfather     Prostate cancer Paternal Grandfather     Substance Abuse Neg Hx        Meds/Allergies       Current Outpatient Medications:     ARIPiprazole (ABILIFY) 2 mg tablet    levonorgestrel-ethinyl estradiol (Marlissa) 0 15-30 MG-MCG per tablet    LORazepam (ATIVAN) 1 mg tablet    pantoprazole (PROTONIX) 20 mg tablet    sertraline (ZOLOFT) 100 mg tablet   sertraline (ZOLOFT) 25 mg tablet    Current Facility-Administered Medications:     sodium chloride 0 9 % infusion, 30 mL/hr, Intravenous, Continuous    Allergies   Allergen Reactions    Pineapple - Food Allergy Anaphylaxis    Penicillins Hives and Vomiting       Objective     /82   Pulse 92   Temp (!) 97 °F (36 1 °C) (Temporal)   Resp 18   Ht 5' 5" (1 651 m)   Wt 64 4 kg (142 lb)   SpO2 98%   BMI 23 63 kg/m²       PHYSICAL EXAM    Gen: NAD  Head: NCAT  CV: RRR  CHEST: Clear  ABD: soft, NT/ND  EXT: no edema      ASSESSMENT/PLAN:  This is a 24y o  year old female here for EGD for evaluation of nausea, abdominal pain, history of gastroparesis, and she is stable and optimized for her procedure

## 2021-08-06 NOTE — ANESTHESIA POSTPROCEDURE EVALUATION
Post-Op Assessment Note    CV Status:  Stable  Pain Score: 0    Pain management: adequate     Mental Status:  Sleepy   Hydration Status:  Stable   PONV Controlled:  None   Airway Patency:  Patent      Post Op Vitals Reviewed: Yes      Staff: CRNA         No complications documented      BP 90/50 (08/06/21 1156)    Temp 98 °F (36 7 °C) (08/06/21 1156)    Pulse 82 (08/06/21 1156)   Resp 20 (08/06/21 1156)    SpO2 99 % (08/06/21 1156)

## 2021-08-06 NOTE — ANESTHESIA PREPROCEDURE EVALUATION
Procedure:  EGD    Relevant Problems   CARDIO   (+) Chest pain      GI/HEPATIC   (+) Gastroesophageal reflux disease      NEURO/PSYCH   (+) Anxiety disorder   (+) Depressive disorder        Physical Exam    Airway    Mallampati score: II  TM Distance: <3 FB  Neck ROM: full     Dental   No notable dental hx     Cardiovascular  Rhythm: regular, Rate: normal, Cardiovascular exam normal    Pulmonary  Pulmonary exam normal     Other Findings        Anesthesia Plan  ASA Score- 2     Anesthesia Type- IV sedation with anesthesia with ASA Monitors  Additional Monitors:   Airway Plan:           Plan Factors-    Induction- intravenous  Postoperative Plan-     Informed Consent- Anesthetic plan and risks discussed with patient

## 2021-11-09 ENCOUNTER — TELEPHONE (OUTPATIENT)
Dept: PSYCHIATRY | Facility: CLINIC | Age: 21
End: 2021-11-09

## 2021-11-11 DIAGNOSIS — F41.9 ANXIETY DISORDER, UNSPECIFIED TYPE: ICD-10-CM

## 2021-11-11 DIAGNOSIS — F32.A DEPRESSIVE DISORDER: ICD-10-CM

## 2021-11-11 RX ORDER — SERTRALINE HYDROCHLORIDE 100 MG/1
100 TABLET, FILM COATED ORAL DAILY
Qty: 90 TABLET | Refills: 1 | Status: SHIPPED | OUTPATIENT
Start: 2021-11-11 | End: 2022-03-11

## 2021-11-15 ENCOUNTER — TELEPHONE (OUTPATIENT)
Dept: GASTROENTEROLOGY | Facility: CLINIC | Age: 21
End: 2021-11-15

## 2021-12-14 ENCOUNTER — APPOINTMENT (OUTPATIENT)
Dept: LAB | Facility: CLINIC | Age: 21
End: 2021-12-14
Payer: COMMERCIAL

## 2021-12-14 DIAGNOSIS — R10.84 GENERALIZED ABDOMINAL PAIN: ICD-10-CM

## 2021-12-14 LAB
ALBUMIN SERPL BCP-MCNC: 4.1 G/DL (ref 3.5–5)
ALP SERPL-CCNC: 80 U/L (ref 46–116)
ALT SERPL W P-5'-P-CCNC: 17 U/L (ref 12–78)
ANION GAP SERPL CALCULATED.3IONS-SCNC: 5 MMOL/L (ref 4–13)
AST SERPL W P-5'-P-CCNC: 12 U/L (ref 5–45)
BILIRUB SERPL-MCNC: 0.62 MG/DL (ref 0.2–1)
BUN SERPL-MCNC: 12 MG/DL (ref 5–25)
CALCIUM SERPL-MCNC: 10.6 MG/DL (ref 8.3–10.1)
CHLORIDE SERPL-SCNC: 108 MMOL/L (ref 100–108)
CO2 SERPL-SCNC: 27 MMOL/L (ref 21–32)
CREAT SERPL-MCNC: 0.85 MG/DL (ref 0.6–1.3)
ERYTHROCYTE [DISTWIDTH] IN BLOOD BY AUTOMATED COUNT: 13 % (ref 11.6–15.1)
GFR SERPL CREATININE-BSD FRML MDRD: 97 ML/MIN/1.73SQ M
GLUCOSE P FAST SERPL-MCNC: 86 MG/DL (ref 65–99)
HCT VFR BLD AUTO: 43.2 % (ref 34.8–46.1)
HGB BLD-MCNC: 13.5 G/DL (ref 11.5–15.4)
LIPASE SERPL-CCNC: 124 U/L (ref 73–393)
MCH RBC QN AUTO: 28.2 PG (ref 26.8–34.3)
MCHC RBC AUTO-ENTMCNC: 31.3 G/DL (ref 31.4–37.4)
MCV RBC AUTO: 90 FL (ref 82–98)
PLATELET # BLD AUTO: 274 THOUSANDS/UL (ref 149–390)
PMV BLD AUTO: 10.1 FL (ref 8.9–12.7)
POTASSIUM SERPL-SCNC: 4.2 MMOL/L (ref 3.5–5.3)
PROT SERPL-MCNC: 8.5 G/DL (ref 6.4–8.2)
RBC # BLD AUTO: 4.79 MILLION/UL (ref 3.81–5.12)
SODIUM SERPL-SCNC: 140 MMOL/L (ref 136–145)
WBC # BLD AUTO: 6.33 THOUSAND/UL (ref 4.31–10.16)

## 2021-12-14 PROCEDURE — 80053 COMPREHEN METABOLIC PANEL: CPT

## 2021-12-14 PROCEDURE — 36415 COLL VENOUS BLD VENIPUNCTURE: CPT

## 2021-12-14 PROCEDURE — 83690 ASSAY OF LIPASE: CPT

## 2021-12-14 PROCEDURE — 85027 COMPLETE CBC AUTOMATED: CPT

## 2021-12-15 ENCOUNTER — OFFICE VISIT (OUTPATIENT)
Dept: GASTROENTEROLOGY | Facility: CLINIC | Age: 21
End: 2021-12-15
Payer: COMMERCIAL

## 2021-12-15 VITALS
HEART RATE: 97 BPM | SYSTOLIC BLOOD PRESSURE: 132 MMHG | HEIGHT: 65 IN | WEIGHT: 148 LBS | BODY MASS INDEX: 24.66 KG/M2 | DIASTOLIC BLOOD PRESSURE: 78 MMHG

## 2021-12-15 DIAGNOSIS — R10.84 GENERALIZED ABDOMINAL PAIN: ICD-10-CM

## 2021-12-15 DIAGNOSIS — K31.84 GASTROPARESIS: Primary | ICD-10-CM

## 2021-12-15 PROCEDURE — 3008F BODY MASS INDEX DOCD: CPT | Performed by: INTERNAL MEDICINE

## 2021-12-15 PROCEDURE — 99214 OFFICE O/P EST MOD 30 MIN: CPT | Performed by: INTERNAL MEDICINE

## 2021-12-15 PROCEDURE — 1036F TOBACCO NON-USER: CPT | Performed by: INTERNAL MEDICINE

## 2021-12-15 RX ORDER — ONDANSETRON 4 MG/1
4 TABLET, FILM COATED ORAL EVERY 8 HOURS PRN
Qty: 20 TABLET | Refills: 0 | Status: SHIPPED | OUTPATIENT
Start: 2021-12-15 | End: 2022-06-17

## 2021-12-20 ENCOUNTER — HOSPITAL ENCOUNTER (OUTPATIENT)
Dept: ULTRASOUND IMAGING | Facility: HOSPITAL | Age: 21
Discharge: HOME | End: 2021-12-20
Attending: INTERNAL MEDICINE
Payer: COMMERCIAL

## 2021-12-20 DIAGNOSIS — R10.84 GENERALIZED ABDOMINAL PAIN: ICD-10-CM

## 2021-12-20 PROCEDURE — 76705 ECHO EXAM OF ABDOMEN: CPT

## 2022-01-10 ENCOUNTER — TELEPHONE (OUTPATIENT)
Dept: GASTROENTEROLOGY | Facility: CLINIC | Age: 22
End: 2022-01-10

## 2022-01-10 NOTE — TELEPHONE ENCOUNTER
MESSAGE FROM LUIS,  AUTHORIZATION NEEDS TO BE OBTAINED FOR CPT # 34091 SCHEDULED FOR TOMORROW 1/11/22  SHE CAN BE REACHED [phone]  THANKS

## 2022-01-11 ENCOUNTER — TELEMEDICINE (OUTPATIENT)
Dept: PSYCHIATRY | Facility: CLINIC | Age: 22
End: 2022-01-11
Payer: COMMERCIAL

## 2022-01-11 ENCOUNTER — HOSPITAL ENCOUNTER (OUTPATIENT)
Dept: RADIOLOGY | Facility: HOSPITAL | Age: 22
Discharge: HOME | End: 2022-01-11
Attending: INTERNAL MEDICINE
Payer: COMMERCIAL

## 2022-01-11 DIAGNOSIS — F41.9 ANXIETY DISORDER, UNSPECIFIED TYPE: Primary | ICD-10-CM

## 2022-01-11 DIAGNOSIS — F32.2 CURRENT SEVERE EPISODE OF MAJOR DEPRESSIVE DISORDER WITHOUT PSYCHOTIC FEATURES WITHOUT PRIOR EPISODE (HCC): ICD-10-CM

## 2022-01-11 DIAGNOSIS — R10.84 GENERALIZED ABDOMINAL PAIN: ICD-10-CM

## 2022-01-11 PROCEDURE — 90833 PSYTX W PT W E/M 30 MIN: CPT | Performed by: STUDENT IN AN ORGANIZED HEALTH CARE EDUCATION/TRAINING PROGRAM

## 2022-01-11 PROCEDURE — 99214 OFFICE O/P EST MOD 30 MIN: CPT | Performed by: STUDENT IN AN ORGANIZED HEALTH CARE EDUCATION/TRAINING PROGRAM

## 2022-01-11 PROCEDURE — 78227 HEPATOBIL SYST IMAGE W/DRUG: CPT

## 2022-01-11 PROCEDURE — A9537 TC99M MEBROFENIN: HCPCS

## 2022-01-11 RX ORDER — DULOXETIN HYDROCHLORIDE 30 MG/1
CAPSULE, DELAYED RELEASE ORAL
Qty: 21 CAPSULE | Refills: 0 | Status: SHIPPED | OUTPATIENT
Start: 2022-01-11 | End: 2022-03-11

## 2022-01-11 RX ORDER — DULOXETIN HYDROCHLORIDE 60 MG/1
CAPSULE, DELAYED RELEASE ORAL
Qty: 30 CAPSULE | Refills: 2 | Status: SHIPPED | OUTPATIENT
Start: 2022-01-11 | End: 2022-03-11 | Stop reason: SDUPTHER

## 2022-01-11 NOTE — PSYCH
Virtual Regular Visit    Verification of patient location:    Patient is located in the following state in which I hold an active license [place]      Assessment/Plan:    Problem List Items Addressed This Visit        Other    Anxiety disorder - Primary    Current severe episode of major depressive disorder without psychotic features without prior episode [facility])          Reason for visit is   Chief Complaint   Patient presents with    Anxiety    Depression        Encounter provider Aamir Ly MD    Provider located at [address] [address] [phone]      Recent Visits  No visits were found meeting these conditions  Showing recent visits within past 7 days and meeting all other requirements  Today's Visits  Date Type Provider Dept   01/11/22 Telemedicine Ananya Irene today's visits and meeting all other requirements  Future Appointments  No visits were found meeting these conditions  Showing future appointments within next 150 days and meeting all other requirements       The patient was identified by name and date of birth  Marnie Pinto was informed that this is a telemedicine visit and that the visit is being conducted through [facility] and patient was informed this is a secure, HIPAA-complaint platform  She agrees to proceed     My office door was closed  No one else was in the room  She acknowledged consent and understanding of privacy and security of the video platform  The patient has agreed to participate and understands they can discontinue the visit at any time  Patient is aware this is a billable service       Psychiatric Medication Management - Maddie 496 24 y o  female MRN: [id number]    Reason for Visit:   Chief Complaint   Patient presents with    Anxiety    Depression       Subjective:  [age] y/o Female, domiciled with parents, brother (20 y/o), mat  grandmother in [place], graduated from [place] in 5/2021 with a bachelor's in medical microbiology, started a [employer] program in [employer] at [address] [place] [address] in fall 2021 semester- withdrew after the semester, no significant PPH, no past psychiatric hospitalizations, no past suicide attempts, no h/o self-injurious behaviors, no h/o physical altercations, PMH significant for h/o lyme disease, gastritis, h/o esophageal candidiasis, no active substance use, presents to [facility] outpatient clinic on referral from gastroenterologist for psychosomatic contributions to chronic abdominal pain with patient reporting "I need help controlling the pain" with father reporting "she's had stress and anxiety "      On problem-focused interview:  1  Somatic Symptoms-  She reports still having a lot medical problems with stomach upset, denying other physical problems      2  Mood/Anxiety- She reports that she planned on doing a master's program in the fall semester at [address] [place] [address]  Patient reports that she was "very depressed" during the fall semester  She reports that it was challenging being in a new place, reports her health getting worse  She reports that she has frequent abdominal pain, nausea, vomiting, diarrhea/constipation, reports that she has been diagnosed with gastroparesis  Patient reports that her grades ranged from A's-C's  Patient reports that she reports that she has officially withdrawn from the semester  Patient reports that she will be getting a job but doesn't have anything lined up yet  Patient reports that she hasn't been as down but still feels irritable a lot of the time, describes her mood as "60% irritable, 40% depressed "  She reports that most of her days are low  She reports that she has been journaling, face masks, doing nails  She reports that she has been hanging out with friends    Patient reports that she would like to get started with a therapist   She reports that she has been having a lot of anxiety, reports that she has intrusive thoughts of people breaking in  Patient denies a significant change in her behavior  She denies any perceptual disturbances  Patient rates her anxiety about 6-7/10 intensity  Patient reports that she has one panic attack in past month  Patient reports that she engaged in self-injurious cutting behavior, first time she cut herself in a long time, to find relief from the stress she was feeling  She reports that she sleeps less than 6 hours per night, has trouble staying asleep  Patient reported weight: 146 lbs      Review Of Systems:     Constitutional Negative   ENT Negative   Cardiovascular Negative   Respiratory Negative   Gastrointestinal Abdominal Pain, Nausea and Diarrhea   Genitourinary Negative   Musculoskeletal Negative   Integumentary Negative   Neurological Negative   Endocrine Negative     Past Medical History:   Patient Active Problem List   Diagnosis    Anxiety disorder    Headache, migraine    Irregular menses    Rapid or irregular heartbeat    Fatigue    Current severe episode of major depressive disorder without psychotic features without prior episode ([facility] [address] )    Gastroparesis    Gastroesophageal reflux disease    Chest pain    Annual physical exam    Need for HPV vaccination       Allergies:    Allergies   Allergen Reactions    Pineapple - Food Allergy Anaphylaxis    Penicillins Hives and Vomiting       Past Surgical History:   Past Surgical History:   Procedure Laterality Date    ESOPHAGOGASTRODUODENOSCOPY      EGD with bipsy    UPPER GASTROINTESTINAL ENDOSCOPY         Past Psychiatric History:  No significant PPH, no past psychiatric hospitalizations, no past suicide attempts, no h/o self-injurious behaviors, no h/o physical altercations       Past Med Trials: Fluoxetine up to 40 mg daily (ineffective), Seroquel XR up to 150 mg qhs (drowsiness)      Family Psychiatric History:   Father- Bipolar I d/o (on Prozac, Seroquel)  Pat  Great Grandmother- Schizophrenia  Mother- Panic Attacks (Xanax, previously on Zoloft)  Mat  Grandmother- Anxiety  Maternal Side- Chronic abdominal pain     No FH of suicide      Social History:   Domiciled with parents, brother (15 y/o) in [place]  Mother employed in [employer], father employed as pharmaceutical consultant  Reports infrequent caffeine use  No active substance use  Reports that she broke up with boyfriend in 2/2021     Substance Abuse History:   Alcohol- drinking occasionally on weekends, a couple of drinks, about once per week, limited alcohol use  Cannabis- occasional use, about once per month, currently using medical marijuana     Traumatic History: Denies any h/o physical or sexual abuse    The following portions of the patient's history were reviewed and updated as appropriate: allergies, current medications, past family history, past medical history, past social history, past surgical history and problem list     Objective: There were no vitals filed for this visit        Weight (last 2 days)     None          Mental status:  Appearance sitting comfortably in chair, dressed in casual clothing, adequate hygiene and grooming, cooperative with interview, fairly well related   Mood  "60% irritable, 40% depressed "   Affect Appears constricted in depressed range, stable, mood-congruent   Speech Normal rate, rhythm, and volume   Thought Processes Linear and goal directed   Associations intact associations   Hallucinations Denies any auditory or visual hallucinations   Thought Content Fleeting passive suicidal ideation and No active suicidal ideation, intent, or plan   Orientation Oriented to person, place, time, and situation   Recent and Remote Memory Grossly intact   Attention Span and Concentration Concentration intact   Intellect Appears to have above average Intelligence   Insight Insight intact   Judgement judgment was intact   Muscle Strength Muscle strength and tone were normal   Language Within normal limits   Fund of Knowledge Age appropriate   Pain None     PHQ-A Depression Screening    Feeling down, depressed, irritable or hopeless: 2 - more than half the days  Little interest or pleasure in doing things: 3 - nearly every day  Trouble falling or staying asleep, or sleeping too much: 3 - nearly every day  Poor appetite or overeating: 3 - nearly every day  Feeling tired or having little energy: 3 - nearly every day  Feeling bad about yourself - or that you are a failure or have let yourself or your family down: 3 - nearly every day  Trouble concentrating on things, such as reading the newspaper or watching television: 3 - nearly every day  Moving or speaking so slowly that other people could have noticed   Or the opposite - being so fidgety or restless that you have been moving around a lot more than usual: 3 - nearly every day  Thoughts that you would be better off dead, or of hurting yourself in some way: 3 - nearly every day                 Assessment/Plan:       Diagnoses and all orders for this visit:    Anxiety disorder, unspecified type    Current severe episode of major depressive disorder without psychotic features without prior episode ([facility] )          [age] y/o Female, domiciled with parents, brother (20 y/o), mat  grandmother in [place], graduated from [place] in 5/2021 with a bachelor's in medical microbiology, started a [employer] program in [address] [place] [address] at [address] [place] [address] in fall 2021 semester- withdrew after the semester, no significant PPH, no past psychiatric hospitalizations, no past suicide attempts, no h/o self-injurious behaviors, no h/o physical altercations, PMH significant for h/o lyme disease, gastritis, h/o esophageal candidiasis, no active substance use, presents to [place] [address] outpatient clinic on referral from gastroenterologist for psychosomatic contributions to chronic abdominal pain with patient reporting "I need help controlling the pain" with father reporting "she's had stress and anxiety "      On assessment today, patient continues to have worsening of mood and anxiety symptoms currently in moderate-severe range, recent episode of self-injurious cutting behavior, took a leave from [employer] to due to mood and anxiety, intermittent passive SI, in psychosocial context of strong FH of mental health problems, living far away from home, transitional stressors   Intermittent passive SI, no current active SI, intent, or plan  On suicide risk assessment, patient with risk factors with moderate-severe depressive symptoms, intermittent passive SI, one episode of recent self-injurious behavior; however, no past suicide attempts, currently future-oriented and goal-directed, no access to firearms, no FH of suicide, no active substance abuse, no global insomnia or psychic anxiety  Therefore, despite risk factors, patient is not an imminent risk of harm to self and does not require inpatient psychiatric hospitalization at this time  Provider discussed a PHP level of care to help manage symptoms- patient declined at this time        Plan:  1  Depression, Anxiety, Chronic Abdominal Pain- Given limited benefit from increased dosages of Zoloft, will cross titrate to Cymbalta at this time  Will do a slow taper of Zoloft given past discontinuation side effects decreasing by 25 mg increments until off medication  Will start Cymbalta 30 mg daily for 3 weeks (once on Zoloft 75 mg), then titrate to Cymbalta 60 mg daily for mood and anxiety symptoms   Will continue Abilify 2 mg daily to help with augmentation treatment for depression, perceptual disturbances   Placed a referral for individual psychotherapy for mood and anxiety symptoms  Will continue Ativan up to 1 mg daily prn severe anxiety or panic attacks  PHQ-A score of 26, severe depression, (1/11/22), JADEN-7 score of 19, severe anxiety symptoms (1/11/22)  2  Medical- Will order metabolic labwork at next visit- continue to closely monitor lipid panel   Continue to f/u with GI doctors for management of abdominal pain  F/u with PCP for on-going medical issues  3  F/u with this provider in 2 months    Risks, Benefits And Possible Side Effects Of Medications:  Risks, benefits, and possible side effects of medications explained to patient and family, they verbalize understanding and Reviewed risks/benefits and side effects of antidepressant medications including black box warning on antidepressants, patient and family verbalize understanding  Psychotherapy Provided: Supportive psychotherapy provided  Counseling was provided during the session today for 16 minutes  Medications, treatment progress and treatment plan reviewed with nAgela Emmanuel  Recent stressor including school stress, social difficulties, everyday stressors and ongoing anxiety discussed with Angela Emmanuel  Coping strategies including getting into a good routine, improving self-esteem, increasing motivation, stress reduction, spending time with family and spending time with friends reviewed with Angela Emmanuel  Reassurance and supportive therapy provided  I spent 30 minutes directly with the patient during this visit    VIRTUAL VISIT DISCLAIMER      Marco Nkechi verbally agrees to participate in [employer]  Pt is aware that [employer] could be limited without vital signs or the ability to perform a full hands-on physical Mer Phipps understands she or the provider may request at any time to terminate the video visit and request the patient to seek care or treatment in person

## 2022-01-11 NOTE — BH TREATMENT PLAN
TREATMENT PLAN (Medication Management Only)        [facility]    Name and Date of Birth:  Pat Gomez 24 y o  2000  Date of Treatment Plan: January 11, 2022  Diagnosis/Diagnoses:    1  Anxiety disorder, unspecified type    2  Current severe episode of major depressive disorder without psychotic features without prior episode [facility])      Strengths/Personal Resources for Self-Care: supportive family, taking medications as prescribed, ability to adapt to life changes, ability to listen, ability to reason  Area/Areas of need (in own words): anxiety symptoms, depressive symptoms  1  Long Term Goal: improve anxiety, improve depression  Target Date: 1 year - 1/11/2023  Person/Persons responsible for completion of goal: RONALD Fang   2   Short Term Objective (s) - How will we reach this goal?:   A  Provider new recommended medication/dosage changes and/or continue medication(s): continue current medications as prescribed  B   Will refer for individual therapy  Target Date: 3 months - 4/11/2022  Person/Persons Responsible for Completion of Goal: RONALD Fang  Progress Towards Goals: continuing treatment  Treatment Modality: medication management every 3 months  Review due 6 months from date of this plan: 6 months - 7/11/2022  Expected length of service: maintenance unless revised  My Physician/PA/NP and I have developed this plan together and I agree to work on the goals and objectives  I understand the treatment goals that were developed for my treatment      Treatment Plan done but not signed at time of office visit due to:  Plan reviewed by phone or in person  and verbal consent given due to [place] social distancing

## 2022-01-20 NOTE — RESULT ENCOUNTER NOTE
Please call the patient regarding her result  [facility] HIDA scan was normal   No evidence of gallbladder abnormalities    Her prior blood work did show  slightly elevated calcium level and I will order labs to recheck this

## 2022-03-11 ENCOUNTER — TELEMEDICINE (OUTPATIENT)
Dept: PSYCHIATRY | Facility: CLINIC | Age: 22
End: 2022-03-11
Payer: COMMERCIAL

## 2022-03-11 DIAGNOSIS — F41.9 ANXIETY DISORDER, UNSPECIFIED TYPE: ICD-10-CM

## 2022-03-11 DIAGNOSIS — F32.2 CURRENT SEVERE EPISODE OF MAJOR DEPRESSIVE DISORDER WITHOUT PSYCHOTIC FEATURES WITHOUT PRIOR EPISODE (HCC): Primary | ICD-10-CM

## 2022-03-11 DIAGNOSIS — Z13.228 SCREENING FOR METABOLIC DISORDER: ICD-10-CM

## 2022-03-11 DIAGNOSIS — F32.A DEPRESSIVE DISORDER: ICD-10-CM

## 2022-03-11 PROBLEM — Z23 NEED FOR HPV VACCINATION: Status: RESOLVED | Noted: 2020-06-18 | Resolved: 2022-03-11

## 2022-03-11 PROCEDURE — 90833 PSYTX W PT W E/M 30 MIN: CPT | Performed by: STUDENT IN AN ORGANIZED HEALTH CARE EDUCATION/TRAINING PROGRAM

## 2022-03-11 PROCEDURE — 99214 OFFICE O/P EST MOD 30 MIN: CPT | Performed by: STUDENT IN AN ORGANIZED HEALTH CARE EDUCATION/TRAINING PROGRAM

## 2022-03-11 RX ORDER — DULOXETIN HYDROCHLORIDE 60 MG/1
60 CAPSULE, DELAYED RELEASE ORAL DAILY
Qty: 90 CAPSULE | Refills: 0 | Status: SHIPPED | OUTPATIENT
Start: 2022-03-11 | End: 2022-05-31 | Stop reason: SDUPTHER

## 2022-03-11 RX ORDER — ARIPIPRAZOLE 2 MG/1
2 TABLET ORAL DAILY
Qty: 90 TABLET | Refills: 1 | Status: SHIPPED | OUTPATIENT
Start: 2022-03-11 | End: 2022-05-31 | Stop reason: SDUPTHER

## 2022-03-11 NOTE — PSYCH
Virtual Regular Visit    Verification of patient location:    Patient is located in the following state in which I hold an active license [place]      Assessment/Plan:    Problem List Items Addressed This Visit        Other    Anxiety disorder    Relevant Medications    DULoxetine (CYMBALTA) 60 mg delayed release capsule    Current severe episode of major depressive disorder without psychotic features without prior episode ([facility] [address] ) - Primary    Relevant Medications    DULoxetine (CYMBALTA) 60 mg delayed release capsule               Reason for visit is   Chief Complaint   Patient presents with   [facility] Depression        Encounter provider Hudson Michaud MD    Provider located at [address] [place] [address]  [address]  [facility] [address] [facility] [id number] [phone]      Recent Visits  No visits were found meeting these conditions  Showing recent visits within past 7 days and meeting all other requirements  Today's Visits  Date Type Provider Dept   03/11/22 Telemedicine Hudson Michaud MD [facility] 18 today's visits and meeting all other requirements  Future Appointments  No visits were found meeting these conditions  Showing future appointments within next 150 days and meeting all other requirements       The patient was identified by name and date of birth  Pat Gomez was informed that this is a telemedicine visit and that the visit is being conducted through [facility] and patient was informed this is a secure, HIPAA-complaint platform  She agrees to proceed     My office door was closed  No one else was in the room  She acknowledged consent and understanding of privacy and security of the video platform  The patient has agreed to participate and understands they can discontinue the visit at any time  Patient is aware this is a billable service  Psychiatric Medication Management - [facility] [address] 24 y o  female MRN: [id number]    Reason for Visit:   Chief Complaint   Patient presents with    Anxiety    Depression       Subjective:      20 y/o Female, domiciled with parents, brother (18 y/o), mat  grandmother in [place], graduated from [place] in 5/2021 with a bachelor's in medical microbiology, started a [employer] program in [address] [place] [address] at [address] [place] [address] in fall 2021 semester- withdrew after the semester, no significant PPH, no past psychiatric hospitalizations, no past suicide attempts, no h/o self-injurious behaviors, no h/o physical altercations, PMH significant for h/o lyme disease, gastritis, h/o esophageal candidiasis, no active substance use, presents to [place] [address] outpatient clinic on referral from gastroenterologist for psychosomatic contributions to chronic abdominal pain with patient reporting "I need help controlling the pain" with father reporting "she's had stress and anxiety "      On problem-focused interview:  1  Somatic Symptoms-  Patient reports that the stomach upset has been about the same, reports that she frequently has abdominal pain        2  Mood/Anxiety- Patient reports that she started virtual psychotherapy through [facility] about 1 5 months ago, reports that she talks to the therapist every other week   Richie Cross reports that she moved back home from [place] about yesterday, reports that it has been an adjustment being back home  She reports that she has been a bit more irritable mostly with family  Patient reports that she has been spending her time helping out with chores around the house, doing a lot of mindfulness activities  She reports not having friends here  Patient reports that her mood has been "less depressed, a little irritable, more sad at night "  Patient reports being alone at night makes her more sad (rating mood 6/10 happiness)  She reports her anxiety has been a bit better, reports less stress has helped with her anxiety    She reports that she has started the Cymbalta  She reports overall better mood  She reports appetite has been stable, reports her energy has been low  Patient reports her sleep could be better, sleeps very little at times  She denies having any hypomanic symptoms despite having periods where she can't sleep  Patient denies any passive or active suicidal ideation, intent, or plan  Review Of Systems:     Constitutional Negative   ENT Negative   Cardiovascular Negative   Respiratory Negative   Gastrointestinal Negative   Genitourinary Negative   Musculoskeletal Negative   Integumentary Negative   Neurological Negative   Endocrine Negative     Past Medical History:   Patient Active Problem List   Diagnosis    Anxiety disorder    Headache, migraine    Irregular menses    Rapid or irregular heartbeat    Fatigue    Current severe episode of major depressive disorder without psychotic features without prior episode ([facility] [address] )    Gastroparesis    Gastroesophageal reflux disease    Chest pain    Annual physical exam       Allergies: Allergies   Allergen Reactions    Pineapple - Food Allergy Anaphylaxis    Penicillins Hives and Vomiting       Past Surgical History:   Past Surgical History:   Procedure Laterality Date    ESOPHAGOGASTRODUODENOSCOPY      EGD with bipsy    UPPER GASTROINTESTINAL ENDOSCOPY         Past Psychiatric History:  No significant PPH, no past psychiatric hospitalizations, no past suicide attempts, no h/o self-injurious behaviors, no h/o physical altercations       Past Med Trials: Fluoxetine up to 40 mg daily (ineffective), Seroquel XR up to 150 mg qhs (drowsiness)      Family Psychiatric History:   Father- Bipolar I d/o (on Prozac, Seroquel)  Pat  Great Grandmother- Schizophrenia  Mother- Panic Attacks (Xanax, previously on Zoloft)  Mat  Grandmother- Anxiety  Maternal Side- Chronic abdominal pain     No FH of suicide      Social History:   Domiciled with parents, brother (15 y/o) in [place]   Mother employed in [employer] [facility] [facility] [employer], father employed as pharmaceutical consultant  Reports infrequent caffeine use  No active substance use  Reports that she broke up with boyfriend in 2/2021  No current relationships      Substance Abuse History:   Alcohol- drinking occasionally on weekends, a couple of drinks, about once per week, limited alcohol use  Cannabis- occasional use, about once per month, currently using medical marijuana on daily basis     Traumatic History: Denies any h/o physical or sexual abuse    The following portions of the patient's history were reviewed and updated as appropriate: allergies, current medications, past family history, past medical history, past social history, past surgical history and problem list     Objective: There were no vitals filed for this visit  Weight (last 2 days)     None          Mental status:  Appearance sitting comfortably in chair, dressed in casual clothing, adequate hygiene and grooming, cooperative with interview   Mood  "less depressed, a little irritable, more sad at night "    Affect Appears mildly constricted in depressed range, stable, mood-congruent   Speech Normal rate, rhythm, and volume   Thought Processes Linear and goal directed   Associations intact associations   Hallucinations Denies any auditory or visual hallucinations   Thought Content No passive or active suicidal or homicidal ideation, intent, or plan     Orientation Oriented to person, place, time, and situation   Recent and Remote Memory Grossly intact   Attention Span and Concentration Concentration intact   Intellect Appears to have above average Intelligence   Insight Insight intact   Judgement judgment was intact   Muscle Strength Muscle strength and tone were normal   Language Within normal limits   Fund of Knowledge Age appropriate   Pain None     PHQ-A Depression Screening    Feeling down, depressed, irritable or hopeless: 1 - several days  Little interest or pleasure in doing things: 2 - more than half the days  Trouble falling or staying asleep, or sleeping too much: 3 - nearly every day  Poor appetite or overeatin - more than half the days  Feeling tired or having little energy: 3 - nearly every day  Feeling bad about yourself - or that you are a failure or have let yourself or your family down: 2 - more than half the days  Trouble concentrating on things, such as reading the newspaper or watching television: 2 - more than half the days  Moving or speaking so slowly that other people could have noticed  Or the opposite - being so fidgety or restless that you have been moving around a lot more than usual: 2 - more than half the days  Thoughts that you would be better off dead, or of hurting yourself in some way: 0 - not at all                 Assessment/Plan:       Diagnoses and all orders for this visit:    Current severe episode of major depressive disorder without psychotic features without prior episode (HCC)  -     DULoxetine (CYMBALTA) 60 mg delayed release capsule;  Take 1 capsule (60 mg total) by mouth daily Will take 30 mg for 3 weeks, then take 60 mg daily    Anxiety disorder, unspecified type          Anxiety disorder, unspecified type     Current severe episode of major depressive disorder without psychotic features without prior episode [facility])            [age] y/o Female, domiciled with parents, brother (18 y/o), mat  grandmother in [place], graduated from [place] in 2021 with a bachelor's in medical microbiology, previously in [employer] in [address] [place] [address] at [address] [place] [address] in 2021 semester- withdrew after the semester, no significant PPH, no past psychiatric hospitalizations, no past suicide attempts, no h/o self-injurious behaviors, no h/o physical altercations, PMH significant for h/o lyme disease, gastritis, h/o esophageal candidiasis, no active substance use, presents to [facility] outpatient clinic on referral from gastroenterologist for psychosomatic contributions to chronic abdominal pain with patient reporting "I need help controlling the pain" with father reporting "she's had stress and anxiety "      On assessment today, patient overall with some improvement in depressive symptoms since last visit but continues to be in moderate range, continues to have moderate levels of anxiety, recently starting with an online therapist and focusing on mindfulness, in psychosocial context of strong FH of mental health problems, living far away from home, transitional stressors   No current passive or active suicidal ideation, intent, or plan       On suicide risk assessment, patient with risk factors with moderate depressive symptoms; however, no past suicide attempts, currently future-oriented and goal-directed, no current suicidal ideation, no access to firearms, no FH of suicide, no active substance abuse, no global insomnia or psychic anxiety  Therefore, despite risk factors, patient is not an imminent risk of harm to self and is appropriate for outpatient level of care at this time          Plan:  1  Depression, Anxiety, Chronic Abdominal Pain- Will continue Cymbalta 60 mg daily for mood and anxiety symptoms- may consider further titration at next visit   Will continue Abilify 2 mg daily to help with augmentation treatment for depression, perceptual disturbances   Continue with individual psychotherapy through [employer] online platform  Will continue Ativan up to 1 mg daily prn severe anxiety or panic attacks  PHQ-A score of 17, moderately severe depression, (3/11/22), JADEN-7 score of 17, severe anxiety symptoms (3/11/22)  2  Medical- Ordered metabolic labwork at today's visit- continue to closely monitor lipid panel   Continue to f/u with GI doctors for management of abdominal pain  F/u with PCP for on-going medical issues    3  F/u with this provider in 2 months    Risks, Benefits And Possible Side Effects Of Medications:  Risks, benefits, and possible side effects of medications explained to patient and family, they verbalize understanding and Reviewed risks/benefits and side effects of antidepressant medications including black box warning on antidepressants, patient and family verbalize understanding  Psychotherapy Provided: Supportive psychotherapy provided  Counseling was provided during the session today for 16 minutes  Medications, treatment progress and treatment plan reviewed with Julianne Zapata  Recent stressor including school stress, everyday stressors and ongoing anxiety discussed with Julianne Zapata  Coping strategies including getting into a good routine, improving self-esteem, increasing motivation, stress reduction, spending time with family and spending time with friends reviewed with Julianne Zapata  Reassurance and supportive therapy provided  I spent 30 minutes directly with the patient during this visit    VIRTUAL VISIT DISCLAIMER      Tessa Candelaria verbally agrees to participate in [employer]  Pt is aware that [employer] could be limited without vital signs or the ability to perform a full hands-on physical Consmessi Lynn understands she or the provider may request at any time to terminate the video visit and request the patient to seek care or treatment in person

## 2022-03-17 ENCOUNTER — TELEPHONE (OUTPATIENT)
Dept: GASTROENTEROLOGY | Facility: CLINIC | Age: 22
End: 2022-03-17

## 2022-03-17 NOTE — TELEPHONE ENCOUNTER
Patient called and left a message stating that she needed a new script for her ECG that Dr Richmond Zapata wanted done before her next visit  I called patient to let her know that the script does not  until 12/15/22 so it is still good to use for this visit  I also gave her the scheduling line phone number to schedule the appt if needed

## 2022-03-21 ENCOUNTER — OFFICE VISIT (OUTPATIENT)
Dept: GASTROENTEROLOGY | Facility: CLINIC | Age: 22
End: 2022-03-21
Payer: COMMERCIAL

## 2022-03-21 VITALS
BODY MASS INDEX: 24.66 KG/M2 | HEIGHT: 65 IN | SYSTOLIC BLOOD PRESSURE: 125 MMHG | DIASTOLIC BLOOD PRESSURE: 81 MMHG | WEIGHT: 148 LBS | HEART RATE: 108 BPM

## 2022-03-21 DIAGNOSIS — E83.52 HYPERCALCEMIA: ICD-10-CM

## 2022-03-21 DIAGNOSIS — K31.84 GASTROPARESIS: Primary | ICD-10-CM

## 2022-03-21 PROCEDURE — 1036F TOBACCO NON-USER: CPT | Performed by: INTERNAL MEDICINE

## 2022-03-21 PROCEDURE — 3008F BODY MASS INDEX DOCD: CPT | Performed by: INTERNAL MEDICINE

## 2022-03-21 PROCEDURE — 99214 OFFICE O/P EST MOD 30 MIN: CPT | Performed by: INTERNAL MEDICINE

## 2022-03-21 NOTE — PROGRESS NOTES
[facility] - Outpatient Follow-up Note  Karyn Fontaine 24 y o  female MRN: [id number]  Encounter: [id number]          ASSESSMENT AND PLAN:      1  Gastroparesis  Symptoms improved somewhat after alteration in anti depressant medication  We discussed results of her recent testing  Discussed options including possible trial of domperidone, intra pyloric Botox injection, referral for consideration of G-POEM  She would like to see how she feels over the next few months prior to trying any more aggressive treatment  2  Hypercalcemia  will evaluate further with repeat calcium and ionized calcium, PTH  ______________________________________________________________________    SUBJECTIVE:  Patient returns in follow-up of gastroparesis  She has carried this diagnosis for several years but had worsening symptoms recently  She reports that she has changed antidepressant medications and her symptoms have improved to some degree  She does continue to have episodes of severe nausea but not vomiting  She has recently undergone further evaluation with abdominal ultrasound, CCK stimulated HIDA scan, CT of the abdomen and pelvis as well as repeat gastric emptying scan  This has all been unremarkable with exception of persistent gastroparesis  Laboratory testing including CBC, CMP and lipase were also unrevealing with the exception of a mildly elevated calcium at 10 6      REVIEW OF SYSTEMS:    Review of Systems   Gastrointestinal: Positive for nausea  Negative for dysphagia, jaundice and vomiting            Historical Information   Past Medical History:   Diagnosis Date    Anemia     Anxiety     Asthma     hx of as child - outgrew     Depression     Exertional shortness of breath     Resolved 12/23/2015     Gastritis     Gastroparesis     Resolved 10/21/2016     GERD (gastroesophageal reflux disease)     Lyme disease     Syncope     Resolved 11/18/2015      Past Surgical History: Procedure Laterality Date    ESOPHAGOGASTRODUODENOSCOPY      EGD with bipsy    UPPER GASTROINTESTINAL ENDOSCOPY       Social History   Social History     Substance and Sexual Activity   Alcohol Use Yes    Comment: social     Social History     Substance and Sexual Activity   Drug Use Yes    Types: Marijuana    Comment: medical marijuana - smokes      Social History     Tobacco Use   Smoking Status Never Smoker   Smokeless Tobacco Never Used   Tobacco Comment    medical marijuana     Family History   Problem Relation Age of Onset    Anxiety disorder Mother     Bipolar disorder Father     Sleep disorder Father     Anxiety disorder Maternal Grandmother     Hyperlipidemia Maternal Grandmother     Hypertension Maternal Grandmother     Thyroid disease Maternal Grandmother     Kidney disease Paternal Grandmother     Lung cancer Maternal Grandfather     Prostate cancer Paternal Grandfather     Substance Abuse Neg Hx        Meds/Allergies       Current Outpatient Medications:     ARIPiprazole (ABILIFY) 2 mg tablet    DULoxetine (CYMBALTA) 60 mg delayed release capsule    levonorgestrel-ethinyl estradiol (Marlissa) 0 15-30 MG-MCG per tablet    LORazepam (ATIVAN) 1 mg tablet    ondansetron (ZOFRAN) 4 mg tablet    Allergies   Allergen Reactions    Pineapple - Food Allergy Anaphylaxis    Penicillins Hives and Vomiting           Objective     Blood pressure 125/81, pulse (!) 108, height 5' 5" (1 651 m), weight 67 1 kg (148 lb)  Body mass index is 24 63 kg/m²  PHYSICAL EXAM:      Physical Exam  Vitals and nursing note reviewed  Constitutional:       General: She is not in acute distress  Appearance: She is not ill-appearing  HENT:      Head: Normocephalic and atraumatic  Eyes:      General: No scleral icterus  Extraocular Movements: Extraocular movements intact  Cardiovascular:      Rate and Rhythm: Normal rate and regular rhythm     Pulmonary:      Effort: Pulmonary effort is normal  No respiratory distress  Abdominal:      General: There is no distension  Palpations: Abdomen is soft  Skin:     General: Skin is warm and dry  Coloration: Skin is not cyanotic  Findings: No erythema  Neurological:      General: No focal deficit present  Mental Status: She is alert and oriented to person, place, and time  Psychiatric:         Mood and Affect: Mood normal          Behavior: Behavior normal           Lab Results:   No visits with results within 1 Day(s) from this visit  Latest known visit with results is:   Appointment on 12/14/2021   Component Date Value    WBC 12/14/2021 6 33     RBC 12/14/2021 4 79     Hemoglobin 12/14/2021 13 5     Hematocrit 12/14/2021 43 2     MCV 12/14/2021 90     MCH 12/14/2021 28 2     MCHC 12/14/2021 31 3*    RDW 12/14/2021 13 0     Platelets 75/57/6906 274     MPV 12/14/2021 10 1     Sodium 12/14/2021 140     Potassium 12/14/2021 4 2     Chloride 12/14/2021 108     CO2 12/14/2021 27     ANION GAP 12/14/2021 5     BUN 12/14/2021 12     Creatinine 12/14/2021 0 85     Glucose, Fasting 12/14/2021 86     Calcium 12/14/2021 10 6*    AST 12/14/2021 12     ALT 12/14/2021 17     Alkaline Phosphatase 12/14/2021 80     Total Protein 12/14/2021 8 5*    Albumin 12/14/2021 4 1     Total Bilirubin 12/14/2021 0 62     eGFR 12/14/2021 97     Lipase 12/14/2021 124          Radiology Results:   No results found

## 2022-05-08 ENCOUNTER — NURSE TRIAGE (OUTPATIENT)
Dept: OTHER | Facility: OTHER | Age: 22
End: 2022-05-08

## 2022-05-08 NOTE — TELEPHONE ENCOUNTER
Patient calling in stating that she woke up and vomited once today and now has been feeling nauseous  No other symptoms and no current signs of dehydration  Home care given per protocol  Patient verbalizes understanding and has no further questions at this time  Reason for Disposition   MILD or MODERATE vomiting (e g , 1 - 5 times / day)    Answer Assessment - Initial Assessment Questions  1  VOMITING SEVERITY: "How many times have you vomited in the past 24 hours?"      - MILD:  1 - 2 times/day     - MODERATE: 3 - 5 times/day, decreased oral intake without significant weight loss or symptoms of dehydration     - SEVERE: 6 or more times/day, vomits everything or nearly everything, with significant weight loss, symptoms of dehydration       Once this morning     2  ONSET: "When did the vomiting begin?"      This morning     3  FLUIDS: "What fluids or food have you vomited up today?" "Have you been able to keep any fluids down?"      Drank water    4  ABDOMINAL PAIN: "Are your having any abdominal pain?" If yes : "How bad is it and what does it feel like?" (e g , crampy, dull, intermittent, constant)       Denies     5  DIARRHEA: "Is there any diarrhea?" If Yes, ask: "How many times today?"       Denies     6  CONTACTS: "Is there anyone else in the family with the same symptoms?"       Unsure     7  CAUSE: "What do you think is causing your vomiting?"      Unsure     8  HYDRATION STATUS: "Any signs of dehydration?" (e g , dry mouth [not only dry lips], too weak to stand) "When did you last urinate?"     Some weakness but no other symptoms  9  OTHER SYMPTOMS: "Do you have any other symptoms?" (e g , fever, headache, vertigo, vomiting blood or coffee grounds, recent head injury)      Denies     10  PREGNANCY: "Is there any chance you are pregnant?" "When was your last menstrual period?"        No, LMP a week ago    Last BM this morning      Protocols used: [facility] AND [facility]U

## 2022-05-08 NOTE — TELEPHONE ENCOUNTER
Regarding: throwing up bile   ----- Message from Eliana Whiteside sent at 5/8/2022  2:39 PM EDT -----  "I woke up throwing up bile   It stopped but I am very nauseas "

## 2022-05-31 ENCOUNTER — TELEMEDICINE (OUTPATIENT)
Dept: PSYCHIATRY | Facility: CLINIC | Age: 22
End: 2022-05-31
Payer: COMMERCIAL

## 2022-05-31 DIAGNOSIS — F31.81 BIPOLAR 2 DISORDER (HCC): Primary | ICD-10-CM

## 2022-05-31 DIAGNOSIS — Z13.228 SCREENING FOR METABOLIC DISORDER: ICD-10-CM

## 2022-05-31 DIAGNOSIS — F41.9 ANXIETY DISORDER, UNSPECIFIED TYPE: ICD-10-CM

## 2022-05-31 PROBLEM — F32.2 CURRENT SEVERE EPISODE OF MAJOR DEPRESSIVE DISORDER WITHOUT PSYCHOTIC FEATURES WITHOUT PRIOR EPISODE (HCC): Status: RESOLVED | Noted: 2018-06-27 | Resolved: 2022-05-31

## 2022-05-31 PROCEDURE — 99214 OFFICE O/P EST MOD 30 MIN: CPT | Performed by: STUDENT IN AN ORGANIZED HEALTH CARE EDUCATION/TRAINING PROGRAM

## 2022-05-31 PROCEDURE — 90833 PSYTX W PT W E/M 30 MIN: CPT | Performed by: STUDENT IN AN ORGANIZED HEALTH CARE EDUCATION/TRAINING PROGRAM

## 2022-05-31 RX ORDER — DULOXETIN HYDROCHLORIDE 60 MG/1
60 CAPSULE, DELAYED RELEASE ORAL DAILY
Qty: 90 CAPSULE | Refills: 0 | Status: SHIPPED | OUTPATIENT
Start: 2022-05-31 | End: 2022-07-19 | Stop reason: SDUPTHER

## 2022-05-31 RX ORDER — ARIPIPRAZOLE 5 MG/1
5 TABLET ORAL DAILY
Qty: 90 TABLET | Refills: 0 | Status: SHIPPED | OUTPATIENT
Start: 2022-05-31 | End: 2022-07-19 | Stop reason: SDUPTHER

## 2022-05-31 RX ORDER — LORAZEPAM 1 MG/1
1 TABLET ORAL DAILY PRN
Qty: 30 TABLET | Refills: 2 | Status: SHIPPED | OUTPATIENT
Start: 2022-05-31

## 2022-05-31 NOTE — BH TREATMENT PLAN
TREATMENT PLAN (Medication Management Only)        [facility]    Name and Date of Birth:  Tristan Both 24 y o  2000  Date of Treatment Plan: May 31, 2022  Diagnosis/Diagnoses:    1  Anxiety disorder, unspecified type    2  Current severe episode of major depressive disorder without psychotic features without prior episode ([address] )    3  Screening for metabolic disorder    4  Depressive disorder      Strengths/Personal Resources for Self-Care: supportive family, taking medications as prescribed, average or above intelligence  Area/Areas of need (in own words): anxiety symptoms, depressive symptoms, mood instability  1  Long Term Goal: improve mood stability  Target Date:6 weeks - 7/12/2022  Person/Persons responsible for completion of goal: Grace  2  Short Term Objective (s) - How will we reach this goal?:   A  Provider new recommended medication/dosage changes and/or continue medication(s): continue current medications as prescribed  B  N/A   C  N/A  Target Date:6 weeks - 7/12/2022  Person/Persons Responsible for Completion of Goal: Grace  Progress Towards Goals: continuing treatment  Treatment Modality: medication management every 6 weeks  Review due 180 days from date of this plan: 3 months - 8/31/2022  Expected length of service: ongoing treatment  My Physician/PA/NP and I have developed this plan together and I agree to work on the goals and objectives  I understand the treatment goals that were developed for my treatment      Treatment Plan:    Completed and signed during the session: Yes - Treatment Plan done but not signed at time of office visit due to:  Plan reviewed by video and verbal consent given due to [address] distancing

## 2022-05-31 NOTE — PSYCH
Virtual Regular Visit    Problem List Items Addressed This Visit        Other    Anxiety disorder    Relevant Medications    ARIPiprazole (ABILIFY) 5 mg tablet    DULoxetine (CYMBALTA) 60 mg delayed release capsule    LORazepam (ATIVAN) 1 mg tablet    Bipolar 2 disorder (HCC) - Primary    Relevant Medications    ARIPiprazole (ABILIFY) 5 mg tablet    DULoxetine (CYMBALTA) 60 mg delayed release capsule    LORazepam (ATIVAN) 1 mg tablet      Other Visit Diagnoses     Screening for metabolic disorder        Relevant Orders    CBC and differential    Comprehensive metabolic panel    Hemoglobin A1C    TSH, 3rd generation with Free T4 reflex    Lipid panel        Reason for visit is   Chief Complaint   Patient presents with    Depression    Anxiety     Encounter provider Raz Nagy MD    Provider located at [address] [address] [phone]    Recent Visits  No visits were found meeting these conditions  Showing recent visits within past 7 days and meeting all other requirements  Today's Visits  Date Type Provider Dept   05/31/22 Telemedicine Raz Nagy MD [facility] 18 today's visits and meeting all other requirements  Future Appointments  No visits were found meeting these conditions  Showing future appointments within next 150 days and meeting all other requirements       After connecting through [email or web address], the patient was identified by name and date of birth  Joseharry Rosado was informed that this is a telemedicine visit and that the visit is being conducted through [address] [place] [address] Now and patient was informed that this is a secure, HIPAA-compliant platform  She agrees to proceed  which may not be secure and therefore, might not be HIPAA-compliant  My office door was closed   The patient was notified the following individuals were present in the room [address] [place], [place] [address] and Johnnie Harp  student  She acknowledged consent and understanding of privacy and security of the video platform  The patient has agreed to participate and understands they can discontinue the visit at any time  SUBJECTIVE:    [age] y/o Female, domiciled with parents, brother (20 y/o), mat  grandmother in [place], graduated [employer] in 5/2021 with a bachelor's in medical microbiology, started a master's program in public health at [address] [place] [address] in fall 2021 semester- withdrew after the semester, no significant PPH, no past psychiatric hospitalizations, no past suicide attempts, no h/o self-injurious behaviors, no h/o physical altercations, PMH significant for h/o lyme disease, gastritis, h/o esophageal candidiasis, no active substance use, presents to [place] [address] outpatient clinic on referral from gastroenterologist for psychosomatic contributions to chronic abdominal pain with patient reporting "I need help controlling the pain" with father reporting "she's had stress and anxiety "      On problem-focused interview:  1  Somatic Symptoms-Grace reports intermittent GI pain that increases during periods of depression  2  Mood/Anxiety- She describes mood as "not great " Feels she has been on a roller coaster  Has had periods of increased depression alternating with periods of elevated mood with decreased need for sleep  During periods of elevated mood, she sleeps less than 6 hours  Awakens at 3-4 AM with increased energy  Watches television or attempts to meditate to decrease energy level when this occurs  Reports these episodes are approximately 2 weeks in duration  Experiences decreased concentration during these episodes  She describes reckless behaviors of increased spending, increased alcohol use, and drinking excessively to the point of blacking out  She denies any sexual promiscuity  She describes self as more outgoing and talkative during these episodes   She experiences racing thoughts and increased goal-directed behavior  She is more irritable and easily agitated  She feels these episodes have been occurring every 2-3 months over the course of 1-2 years  She said she did not previously recognize the pattern but feels her episodes are similar to her father's who has a bipolar diagnosis  During depressive episodes, she has been experiencing frequent tearfulness and elevated anxiety with episodes of panic  She has feelings of guilt, feels she is letting people down  She reports hypersomnia during depressive episodes, sleeping 8-10 hours per night and napping frequently throughout day  Feelings of fatigue and low energy  She continues to try to engage in activities she enjoys  She does have decreased motivation  Poor concentration and decreased appetite with worsened GI pain during depressive episodes  She denies any recent suicidal ideation, plan, or intent  In November 2021, she admits to thoughts of self-harm during a depressive episode when contemplating withdrawing from college  She reports having difficulty working as a  due to anxiety symptoms and left job  Plans to seek employment and go on vacation with family this summer  A friend's mother offered her a job in [employer] but feels moving across the country would be overwhelming at this point  She is looking at careers using her microbiology degree  Reached out to a crisis hotline approximately 1 1/2 months ago for support, was not having suicidal ideation at that time         Review Of Systems:     Constitutional Negative   ENT Negative   Cardiovascular Negative   Respiratory Negative   Gastrointestinal Negative   Genitourinary Negative   Musculoskeletal Negative   Integumentary Negative   Neurological Negative   Endocrine Negative     Past Medical History:   Patient Active Problem List   Diagnosis    Anxiety disorder    Headache, migraine    Irregular menses    Rapid or irregular heartbeat    Fatigue    Bipolar 2 disorder ([facility] [address] )  Gastroparesis    Gastroesophageal reflux disease    Chest pain    Annual physical exam       Allergies: Allergies   Allergen Reactions    Pineapple - Food Allergy Anaphylaxis    Penicillins Hives and Vomiting       Past Surgical History:   Past Surgical History:   Procedure Laterality Date    ESOPHAGOGASTRODUODENOSCOPY      EGD with bipsy    UPPER GASTROINTESTINAL ENDOSCOPY         Past Psychiatric History:  No significant PPH, no past psychiatric hospitalizations, no past suicide attempts, no h/o self-injurious behaviors, no h/o physical altercations       Past Med Trials: Fluoxetine up to 40 mg daily (ineffective), Seroquel XR up to 150 mg qhs (drowsiness)      Family Psychiatric History:   Father- Bipolar I d/o (on Prozac, Seroquel)  Pat  Great Grandmother- Schizophrenia  Mother- Panic Attacks (Xanax, previously on Zoloft)  Mat  Grandmother- Anxiety  Maternal Side- Chronic abdominal pain     No FH of suicide     Social History:   Domiciled with parents, brother (15 y/o) in [place]  Mother employed in [employer], father employed as pharmaceutical consultant  Reports infrequent caffeine use  No active substance use  Reports that she broke up with boyfriend in 2/2021    No current relationships      Substance Abuse History:   Alcohol- drinking occasionally on weekends, a couple of drinks, about once per week, limited alcohol use  Cannabis- occasional use, about once per month, currently using medical marijuana on daily basis     Traumatic History: Denies any h/o physical or sexual abuse    The following portions of the patient's history were reviewed and updated as appropriate: allergies, current medications, past family history, past medical history, past social history, past surgical history and problem list     OBJECTIVE:     Mental status:  Appearance sitting comfortably in chair, dressed in casual clothing   Mood depressed   Affect Appears generally euthymic, stable, mood-congruent   Speech Normal rate, rhythm, and volume   Thought Processes Linear and goal directed   Associations intact associations   Hallucinations Denies any auditory or visual hallucinations   Thought Content No passive or active suicidal or homicidal ideation, intent, or plan  Orientation Oriented to person, place, time, and situation   Recent and Remote Memory Grossly intact   Attention Span and Concentration Concentration intact   Intellect Appears to be of Average Intelligence   Insight Insight intact   Judgement judgment was intact   Muscle Strength EDER virtual visit   Language Within normal limits   Fund of Knowledge Age appropriate   Pain None     Assessment/Plan:       Diagnoses and all orders for this visit:    Bipolar 2 disorder (HCC)  -     ARIPiprazole (ABILIFY) 5 mg tablet; Take 1 tablet (5 mg total) by mouth daily  -     DULoxetine (CYMBALTA) 60 mg delayed release capsule; Take 1 capsule (60 mg total) by mouth daily    Anxiety disorder, unspecified type  -     LORazepam (ATIVAN) 1 mg tablet; Take 1 tablet (1 mg total) by mouth daily as needed for anxiety    Screening for metabolic disorder  -     CBC and differential; Future  -     Comprehensive metabolic panel; Future  -     Hemoglobin A1C; Future  -     TSH, 3rd generation with Free T4 reflex; Future  -     Lipid panel; Future          Diagnosis: 1  Bipolar 2 Disorder, 2   Unspecified Anxiety Disorder      [age] y/[age] Female, domiciled with parents, brother (20 y/o), mat  grandmother in [place], graduated [employer] in 5/2021 with a bachelor's in medical microbiology, previously in [employer] in [employer] at [address] [place] [address] in fall 2021 semester- withdrew after the semester, no significant PPH, no past psychiatric hospitalizations, no past suicide attempts, no h/o self-injurious behaviors, no h/o physical altercations, PMH significant for h/o lyme disease, gastritis, h/o esophageal candidiasis, no active substance use, presents to [place] [address] outpatient clinic on referral from gastroenterologist for psychosomatic contributions to chronic abdominal pain with patient reporting "I need help controlling the pain" with father reporting "she's had stress and anxiety "      On assessment today, patient reports worsening mood symptoms with alternating episodes of increased depression and episodes of elevated energy with decreased need for sleep, currently endorsing severe depressive symptoms and severe anxiety symptoms, in psychosocial context of strong FH of mental health problems, withdrawing from college, transitional stressors, and recently leaving a job due to severe anxiety symptoms  No current passive or active suicidal ideation, intent, or plan       On suicide risk assessment, patient with risk factors with severe depressive symptoms; however, no past suicide attempts, currently future-oriented and goal-directed, no current suicidal ideation, no access to firearms, no FH of suicide, no active substance abuse, no global insomnia or psychic anxiety   Therefore, despite risk factors, patient is not an imminent risk of harm to self and is appropriate for outpatient level of care at this time  Plan:  1  Bipolar D/o, Anxiety, Chronic Abdominal Pain- Will continue Cymbalta 60 mg daily for mood and anxiety symptoms   Will increase Abilify to 5 mg daily to help with augmentation treatment for depression, mood episodes   Continue with individual psychotherapy through [email or web address] online platform   Will continue Ativan up to 1 mg daily prn severe anxiety or panic attacks  PHQ-A score of 23, severe depression, (5/31/22), JADEN-7 score of 19, severe anxiety symptoms (5/31/22)  2  Carisa Mcallister at today's visit- continue to closely monitor lipid panel   Continue to f/u with GI doctors for management of abdominal pain  F/u with PCP for on-going medical issues  3  F/u with this provider in 6 weeks        Treatment Recommendations:      Risks, Benefits And Possible Side Effects Of Medications:  Risks, benefits, and possible side effects of medications explained to patient and family, they verbalize understanding and Reviewed risks/benefits and side effects of antidepressant medications including black box warning on antidepressants, patient and family verbalize understanding  Controlled Medication Discussion: No records found for controlled prescriptions according to Kerry Stevens 17       Psychotherapy Provided: Supportive psychotherapy provided  Counseling was provided during the session today for 16 minutes  Medications, treatment progress and treatment plan reviewed with Lisette Ferguson  Recent stressor including school stress, social difficulties, everyday stressors and ongoing anxiety discussed with Lisette Ferguson  Coping strategies including getting into a good routine, improving self-esteem, increasing motivation, maintain healthy diet, maintain heathy sleeping hygiene, maintain positive attitude, stress reduction, spending time with family and spending time with friends reviewed with Lisette Ferguson  Reassurance and supportive therapy provided  I spent 30 minutes with the patient during this visit      Timothy Webb MD 05/31/22

## 2022-06-02 DIAGNOSIS — Z30.41 USES ORAL CONTRACEPTION: ICD-10-CM

## 2022-06-02 RX ORDER — LEVONORGESTREL AND ETHINYL ESTRADIOL 0.15-0.03
KIT ORAL
Qty: 28 TABLET | Refills: 5 | Status: SHIPPED | OUTPATIENT
Start: 2022-06-02

## 2022-06-17 ENCOUNTER — OFFICE VISIT (OUTPATIENT)
Dept: GASTROENTEROLOGY | Facility: CLINIC | Age: 22
End: 2022-06-17
Payer: COMMERCIAL

## 2022-06-17 VITALS
WEIGHT: 146.6 LBS | HEIGHT: 65 IN | BODY MASS INDEX: 24.43 KG/M2 | DIASTOLIC BLOOD PRESSURE: 77 MMHG | SYSTOLIC BLOOD PRESSURE: 112 MMHG | HEART RATE: 98 BPM

## 2022-06-17 DIAGNOSIS — F31.81 BIPOLAR 2 DISORDER (HCC): Primary | ICD-10-CM

## 2022-06-17 DIAGNOSIS — K31.84 GASTROPARESIS: ICD-10-CM

## 2022-06-17 PROCEDURE — 99213 OFFICE O/P EST LOW 20 MIN: CPT | Performed by: INTERNAL MEDICINE

## 2022-06-17 RX ORDER — ONDANSETRON 4 MG/1
4 TABLET, ORALLY DISINTEGRATING ORAL EVERY 6 HOURS PRN
Qty: 20 TABLET | Refills: 3 | Status: SHIPPED | OUTPATIENT
Start: 2022-06-17

## 2022-06-17 NOTE — PROGRESS NOTES
[facility] - Outpatient Follow-up Note  Marlen Bachelor 24 y o  female MRN: [id number]  Encounter: [id number]          ASSESSMENT AND PLAN:      1  Gastroparesis  Symptoms stable and tolerable at this time  We discussed options for more aggressive treatment which she would like to hold off on unless symptoms worsen  Reports that ondansetron takes too long to work  Will try the oral dissolving tablets to see if this is more helpful  Will follow-up here in several months  Serum calcium mildly elevated in the past   Has not yet performed repeat laboratory testing ordered at her last visit     - Ambulatory referral to Gastroenterology  - ondansetron (Zofran ODT) 4 mg disintegrating tablet; Take 1 tablet (4 mg total) by mouth every 6 (six) hours as needed for nausea or vomiting  Dispense: 20 tablet; Refill: 3    2  Bipolar 2 disorder ([address] )  Following closely with her psychiatrist   She has noted improvement in her gastroparesis symptoms with alteration of her antidepressant medication  We discussed potential use of tricyclic antidepressant for abdominal pain in the setting of gastroparesis  She has tried this in the past and did not tolerate it due to severe fatigue even dosing at bedtime    ______________________________________________________________________    SUBJECTIVE:  Patient returns in follow-up for gastroparesis  This has been longstanding for least the past 6 years or more  Her symptoms had worsened in the latter half of 2021 but have improved to some degree and are stable with weekly episodes of bilious regurgitation but less common vomiting  She does continue to have fairly frequent abdominal bloating and discomfort especially on the right side after eating  Evaluation of the gallbladder has been unrevealing  She recently returned to [place] from [place] where she was pursuing a master's in [employer]    She decided that this was not career path she wishes to take       REVIEW OF SYSTEMS:    Review of Systems   Constitutional: Negative for fever and weight loss  Musculoskeletal: Negative for myalgias  Gastrointestinal: Positive for abdominal pain, anorexia, constipation, diarrhea, flatus, nausea and vomiting  Negative for hematochezia and melena  Genitourinary: Negative for dysuria, frequency and hematuria  Neurological: Positive for headaches        Answers for HPI/ROS submitted by the patient on 6/14/2022  Chronicity: chronic  Onset: more than 1 year ago  Onset quality: undetermined  Frequency: 2 to 4 times per day  Episode duration: 2 hours  Progression since onset: gradually improving  Pain location: RUQ, epigastric region  Pain - numeric: 5/10  Pain quality: cramping  Radiates to: does not radiate, RLQ  arthralgias: No  belching: Yes  Aggravated by: being still, drinking alcohol, eating  Relieved by: belching, certain positions, liquids, recumbency, vomiting  Diagnostic workup: CT scan, GI consult, ultrasound, upper endoscopy        Historical Information   Past Medical History:   Diagnosis Date    Anemia     Anxiety     Asthma     hx of as child - outgrew     Depression     Exertional shortness of breath     Resolved 12/23/2015     Gastritis     Gastroparesis     Resolved 10/21/2016     GERD (gastroesophageal reflux disease)     Lyme disease     Syncope     Resolved 11/18/2015      Past Surgical History:   Procedure Laterality Date    ESOPHAGOGASTRODUODENOSCOPY      EGD with bipsy    UPPER GASTROINTESTINAL ENDOSCOPY       Social History   Social History     Substance and Sexual Activity   Alcohol Use Yes    Comment: social     Social History     Substance and Sexual Activity   Drug Use Yes    Types: Marijuana    Comment: medical marijuana - smokes      Social History     Tobacco Use   Smoking Status Never Smoker   Smokeless Tobacco Never Used   Tobacco Comment    medical marijuana     Family History   Problem Relation Age of Onset    Anxiety disorder Mother     Bipolar disorder Father     Sleep disorder Father     Anxiety disorder Maternal Grandmother     Hyperlipidemia Maternal Grandmother     Hypertension Maternal Grandmother     Thyroid disease Maternal Grandmother     Kidney disease Paternal Grandmother     Lung cancer Maternal Grandfather     Prostate cancer Paternal Grandfather     Substance Abuse Neg Hx        Meds/Allergies       Current Outpatient Medications:     ARIPiprazole (ABILIFY) 5 mg tablet    DULoxetine (CYMBALTA) 60 mg delayed release capsule    LORazepam (ATIVAN) 1 mg tablet    Marlissa 0 15-30 MG-MCG per tablet    ondansetron (Zofran ODT) 4 mg disintegrating tablet    Allergies   Allergen Reactions    Pineapple - Food Allergy Anaphylaxis    Penicillins Hives and Vomiting           Objective     Blood pressure 112/77, pulse 98, height 5' 5" (1 651 m), weight 66 5 kg (146 lb 9 6 oz)  Body mass index is 24 4 kg/m²  PHYSICAL EXAM:      Physical Exam  Vitals and nursing note reviewed  Constitutional:       General: She is not in acute distress  Appearance: She is not ill-appearing  HENT:      Head: Normocephalic and atraumatic  Eyes:      General: No scleral icterus  Extraocular Movements: Extraocular movements intact  Cardiovascular:      Rate and Rhythm: Normal rate and regular rhythm  Pulmonary:      Effort: Pulmonary effort is normal  No respiratory distress  Abdominal:      General: There is no distension  Palpations: Abdomen is soft  Tenderness: There is no abdominal tenderness  There is no guarding or rebound  Hernia: No hernia is present  Musculoskeletal:      Right lower leg: No edema  Left lower leg: No edema  Skin:     General: Skin is warm and dry  Coloration: Skin is not cyanotic  Findings: No erythema  Neurological:      General: No focal deficit present  Mental Status: She is alert and oriented to person, place, and time     Psychiatric: Mood and Affect: Mood normal          Behavior: Behavior normal           Lab Results:   No visits with results within 1 Day(s) from this visit  Latest known visit with results is:   Appointment on 12/14/2021   Component Date Value    WBC 12/14/2021 6 33     RBC 12/14/2021 4 79     Hemoglobin 12/14/2021 13 5     Hematocrit 12/14/2021 43 2     MCV 12/14/2021 90     MCH 12/14/2021 28 2     MCHC 12/14/2021 31 3 (A)    RDW 12/14/2021 13 0     Platelets 26/91/0007 274     MPV 12/14/2021 10 1     Sodium 12/14/2021 140     Potassium 12/14/2021 4 2     Chloride 12/14/2021 108     CO2 12/14/2021 27     ANION GAP 12/14/2021 5     BUN 12/14/2021 12     Creatinine 12/14/2021 0 85     Glucose, Fasting 12/14/2021 86     Calcium 12/14/2021 10 6 (A)    AST 12/14/2021 12     ALT 12/14/2021 17     Alkaline Phosphatase 12/14/2021 80     Total Protein 12/14/2021 8 5 (A)    Albumin 12/14/2021 4 1     Total Bilirubin 12/14/2021 0 62     eGFR 12/14/2021 97     Lipase 12/14/2021 124          Radiology Results:   No results found

## 2022-07-15 ENCOUNTER — TELEPHONE (OUTPATIENT)
Dept: GASTROENTEROLOGY | Facility: CLINIC | Age: 22
End: 2022-07-15

## 2022-07-15 NOTE — TELEPHONE ENCOUNTER
LMOM Need to reschedule her 12/5/22 appt w/ Dr Georgi Frank  Can reschedule to 12/7 or 12/8  If pt calls please reschedule  I will follow up in 1 week

## 2022-07-19 ENCOUNTER — TELEMEDICINE (OUTPATIENT)
Dept: PSYCHIATRY | Facility: CLINIC | Age: 22
End: 2022-07-19
Payer: COMMERCIAL

## 2022-07-19 ENCOUNTER — APPOINTMENT (OUTPATIENT)
Dept: LAB | Facility: CLINIC | Age: 22
End: 2022-07-19
Payer: COMMERCIAL

## 2022-07-19 DIAGNOSIS — Z13.228 SCREENING FOR METABOLIC DISORDER: ICD-10-CM

## 2022-07-19 DIAGNOSIS — F31.81 BIPOLAR 2 DISORDER (HCC): Primary | ICD-10-CM

## 2022-07-19 DIAGNOSIS — E83.52 HYPERCALCEMIA: ICD-10-CM

## 2022-07-19 DIAGNOSIS — F41.9 ANXIETY DISORDER, UNSPECIFIED TYPE: ICD-10-CM

## 2022-07-19 LAB
ALBUMIN SERPL BCP-MCNC: 3.4 G/DL (ref 3.5–5)
ALP SERPL-CCNC: 68 U/L (ref 46–116)
ALT SERPL W P-5'-P-CCNC: 18 U/L (ref 12–78)
ANION GAP SERPL CALCULATED.3IONS-SCNC: 5 MMOL/L (ref 4–13)
AST SERPL W P-5'-P-CCNC: 15 U/L (ref 5–45)
BASOPHILS # BLD AUTO: 0.07 THOUSANDS/ΜL (ref 0–0.1)
BASOPHILS NFR BLD AUTO: 1 % (ref 0–1)
BILIRUB SERPL-MCNC: 0.26 MG/DL (ref 0.2–1)
BUN SERPL-MCNC: 8 MG/DL (ref 5–25)
CALCIUM ALBUM COR SERPL-MCNC: 9.3 MG/DL (ref 8.3–10.1)
CALCIUM SERPL-MCNC: 8.8 MG/DL (ref 8.3–10.1)
CHLORIDE SERPL-SCNC: 110 MMOL/L (ref 96–108)
CHOLEST SERPL-MCNC: 173 MG/DL
CO2 SERPL-SCNC: 25 MMOL/L (ref 21–32)
CREAT SERPL-MCNC: 0.87 MG/DL (ref 0.6–1.3)
EOSINOPHIL # BLD AUTO: 0.24 THOUSAND/ΜL (ref 0–0.61)
EOSINOPHIL NFR BLD AUTO: 3 % (ref 0–6)
ERYTHROCYTE [DISTWIDTH] IN BLOOD BY AUTOMATED COUNT: 13 % (ref 11.6–15.1)
EST. AVERAGE GLUCOSE BLD GHB EST-MCNC: 103 MG/DL
GFR SERPL CREATININE-BSD FRML MDRD: 94 ML/MIN/1.73SQ M
GLUCOSE P FAST SERPL-MCNC: 91 MG/DL (ref 65–99)
HBA1C MFR BLD: 5.2 %
HCT VFR BLD AUTO: 41.9 % (ref 34.8–46.1)
HDLC SERPL-MCNC: 68 MG/DL
HGB BLD-MCNC: 13.3 G/DL (ref 11.5–15.4)
IMM GRANULOCYTES # BLD AUTO: 0.03 THOUSAND/UL (ref 0–0.2)
IMM GRANULOCYTES NFR BLD AUTO: 0 % (ref 0–2)
LDLC SERPL CALC-MCNC: 92 MG/DL (ref 0–100)
LYMPHOCYTES # BLD AUTO: 3.16 THOUSANDS/ΜL (ref 0.6–4.47)
LYMPHOCYTES NFR BLD AUTO: 39 % (ref 14–44)
MCH RBC QN AUTO: 28.9 PG (ref 26.8–34.3)
MCHC RBC AUTO-ENTMCNC: 31.7 G/DL (ref 31.4–37.4)
MCV RBC AUTO: 91 FL (ref 82–98)
MONOCYTES # BLD AUTO: 0.53 THOUSAND/ΜL (ref 0.17–1.22)
MONOCYTES NFR BLD AUTO: 7 % (ref 4–12)
NEUTROPHILS # BLD AUTO: 4.16 THOUSANDS/ΜL (ref 1.85–7.62)
NEUTS SEG NFR BLD AUTO: 50 % (ref 43–75)
NONHDLC SERPL-MCNC: 105 MG/DL
NRBC BLD AUTO-RTO: 0 /100 WBCS
PLATELET # BLD AUTO: 286 THOUSANDS/UL (ref 149–390)
PMV BLD AUTO: 10.1 FL (ref 8.9–12.7)
POTASSIUM SERPL-SCNC: 4 MMOL/L (ref 3.5–5.3)
PROT SERPL-MCNC: 7.5 G/DL (ref 6.4–8.4)
PTH-INTACT SERPL-MCNC: 30.2 PG/ML (ref 18.4–80.1)
RBC # BLD AUTO: 4.6 MILLION/UL (ref 3.81–5.12)
SODIUM SERPL-SCNC: 140 MMOL/L (ref 135–147)
TRIGL SERPL-MCNC: 63 MG/DL
TSH SERPL DL<=0.05 MIU/L-ACNC: 2.46 UIU/ML (ref 0.45–4.5)
WBC # BLD AUTO: 8.19 THOUSAND/UL (ref 4.31–10.16)

## 2022-07-19 PROCEDURE — 85025 COMPLETE CBC W/AUTO DIFF WBC: CPT

## 2022-07-19 PROCEDURE — 83036 HEMOGLOBIN GLYCOSYLATED A1C: CPT

## 2022-07-19 PROCEDURE — 90833 PSYTX W PT W E/M 30 MIN: CPT | Performed by: STUDENT IN AN ORGANIZED HEALTH CARE EDUCATION/TRAINING PROGRAM

## 2022-07-19 PROCEDURE — 99214 OFFICE O/P EST MOD 30 MIN: CPT | Performed by: STUDENT IN AN ORGANIZED HEALTH CARE EDUCATION/TRAINING PROGRAM

## 2022-07-19 PROCEDURE — 80061 LIPID PANEL: CPT

## 2022-07-19 PROCEDURE — 80053 COMPREHEN METABOLIC PANEL: CPT

## 2022-07-19 PROCEDURE — 36415 COLL VENOUS BLD VENIPUNCTURE: CPT

## 2022-07-19 PROCEDURE — 83970 ASSAY OF PARATHORMONE: CPT

## 2022-07-19 PROCEDURE — 84443 ASSAY THYROID STIM HORMONE: CPT

## 2022-07-19 RX ORDER — ARIPIPRAZOLE 5 MG/1
5 TABLET ORAL DAILY
Qty: 90 TABLET | Refills: 0 | Status: SHIPPED | OUTPATIENT
Start: 2022-07-19

## 2022-07-19 RX ORDER — DULOXETIN HYDROCHLORIDE 60 MG/1
60 CAPSULE, DELAYED RELEASE ORAL DAILY
Qty: 90 CAPSULE | Refills: 0 | Status: SHIPPED | OUTPATIENT
Start: 2022-07-19

## 2022-07-19 NOTE — PSYCH
Virtual Regular Visit    Verification of patient location:    Patient is located in the following state in which I hold an active license [place]    Assessment/Plan:    Problem List Items Addressed This Visit        Other    Anxiety disorder    Relevant Medications    DULoxetine (CYMBALTA) 60 mg delayed release capsule    ARIPiprazole (ABILIFY) 5 mg tablet    Bipolar 2 disorder ([facility] [address] ) - Primary    Relevant Medications    DULoxetine (CYMBALTA) 60 mg delayed release capsule    ARIPiprazole (ABILIFY) 5 mg tablet          Reason for visit is   Chief Complaint   Patient presents with   [address]        Encounter provider Natalie Nelson MD    Provider located at [address] [place] [id number] [phone]      Recent Visits  No visits were found meeting these conditions  Showing recent visits within past 7 days and meeting all other requirements  Today's Visits  Date Type Provider Dept   07/19/22 Telemedicine Natalie Nelson MD [facility] today's visits and meeting all other requirements  Future Appointments  No visits were found meeting these conditions  Showing future appointments within next 150 days and meeting all other requirements       The patient was identified by name and date of birth  Lokesh Silva was informed that this is a telemedicine visit and that the visit is being conducted through [facility] and patient was informed this is a secure, HIPAA-complaint platform  She agrees to proceed     My office door was closed  Alysha Griffiths MS III was  in the room  She acknowledged consent and understanding of privacy and security of the video platform  The patient has agreed to participate and understands they can discontinue the visit at any time  Patient is aware this is a billable service  Psychiatric [facility] - [place] [address] 20 y o  female MRN: [id number]    Reason for Visit:   Chief Complaint   Patient presents with    Anxiety    Depression    Mood Swings       Subjective:    22-1 y/o Female, domiciled with parents, brother (18 y/o), mat  grandmother in [facility], graduated [employer] in 5/2021 with a bachelor's in medical microbiology, started a master's program in public health at [address] [place] [address] in fall 2021 semester- withdrew after the semester, currently unemployed, no significant PPH, no past psychiatric hospitalizations, no past suicide attempts, no h/o self-injurious behaviors, no h/o physical altercations, PMH significant for h/o lyme disease, gastritis, h/o esophageal candidiasis, no active substance use, presents to [place] [id number] outpatient clinic on referral from gastroenterologist for psychosomatic contributions to chronic abdominal pain with patient reporting "I need help controlling the pain" with father reporting "she's had stress and anxiety "      On problem-focused interview:  1  Somatic Symptoms- Patient denies any physical concerns other than some tiredness and low energy  She reports that she has been trying to walk everyday        2  Mood/Anxiety- She reports her irritability has been better, reports that her anxiety has been better  She reports feeling that things are moving in a positive direction  She has a job interview tomorrow at a laboratory  She reports both her mood and anxiety symptoms are moving in a positive direction  She reports trouble staying asleep, sleeping about 5-6 hours per night  She reports more interested in doing things, finding more pleasure in activities  She reports some mild feelings of hopeless at times  She reports that she had a thought to self-harm a few days ago but was able to calm herself down  She reports her energy has been a bit better, able to stay up longer than she was previously    Patient feels that she has gained weight since last visit, reports maybe 5-10 pounds since last visit  Patient denies any passive or active suicidal ideation, intent, or plan  Patient reports that 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  She reports that she enjoys painting by numbers, getting in more routine  Current Weight: 151 lbs    Review Of Systems:     Constitutional Negative   ENT Negative   Cardiovascular Negative   Respiratory Negative   Gastrointestinal Negative   Genitourinary Negative   Musculoskeletal Negative   Integumentary Negative   Neurological Negative   Endocrine Negative     Past Medical History:   Patient Active Problem List   Diagnosis    Anxiety disorder    Headache, migraine    Irregular menses    Rapid or irregular heartbeat    Fatigue    Bipolar 2 disorder ([address] )    Gastroparesis    Gastroesophageal reflux disease    Chest pain    Annual physical exam       Allergies: Allergies   Allergen Reactions    Pineapple - Food Allergy Anaphylaxis    Penicillins Hives and Vomiting       Past Surgical History:   Past Surgical History:   Procedure Laterality Date    ESOPHAGOGASTRODUODENOSCOPY      EGD with bipsy    UPPER GASTROINTESTINAL ENDOSCOPY         Past Psychiatric History:  No significant PPH, no past psychiatric hospitalizations, no past suicide attempts, no h/o self-injurious behaviors, no h/o physical altercations  Currently in outpatient therapy through [facility] virtual therapy on weekly basis        Past Med Trials: Fluoxetine up to 40 mg daily (ineffective), Seroquel XR up to 150 mg qhs (drowsiness)      Family Psychiatric History:   Father- Bipolar I d/o (on Prozac, Seroquel)  Pat  Great Grandmother- Schizophrenia  Mother- Panic Attacks (Xanax, previously on Zoloft)  Mat  Grandmother- Anxiety  Maternal Side- Chronic abdominal pain     No FH of suicide      Social History:   Domiciled with parents, brother (15 y/o) in [place]  Mother employed in [employer], father employed as pharmaceutical consultant   Reports infrequent caffeine use  No active substance use  Reports that she broke up with boyfriend in 2/2021   No current relationships      Substance Abuse History:   Alcohol- drinking occasionally on weekends, a couple of drinks, about once per week, limited alcohol use  Cannabis- occasional use, about once per month, currently using medical marijuana on daily basis     Traumatic History: Denies any h/o physical or sexual abuse    The following portions of the patient's history were reviewed and updated as appropriate: allergies, current medications, past family history, past medical history, past social history, past surgical history and problem list     Objective: There were no vitals filed for this visit  Weight (last 2 days)     None          Mental status:  Appearance sitting comfortably in chair, dressed in casual clothing, adequate hygiene and grooming, cooperative with interview, fairly well related   Mood "better, more stable"   Affect Appears mildly constricted in depressed range, stable, mood-congruent   Speech Normal rate, rhythm, and volume   Thought Processes Linear and goal directed   Associations intact associations   Hallucinations Denies any auditory or visual hallucinations   Thought Content No passive or active suicidal or homicidal ideation, intent, or plan     Orientation Oriented to person, place, time, and situation   Recent and Remote Memory Grossly intact   Attention Span and Concentration Concentration intact   Intellect Appears to be of Average Intelligence   Insight Insight intact   Judgement judgment was intact   Muscle Strength Muscle strength and tone were normal   Language Within normal limits   Fund of Knowledge Age appropriate   Pain None     PHQ-A Depression Screening    Feeling down, depressed, irritable or hopeless: 1 - several days  Little interest or pleasure in doing things: 1 - several days  Trouble falling or staying asleep, or sleeping too much: 3 - nearly every day  Poor appetite or overeatin - several days  Feeling tired or having little energy: 3 - nearly every day  Feeling bad about yourself - or that you are a failure or have let yourself or your family down: 1 - several days  Trouble concentrating on things, such as reading the newspaper or watching television: 3 - nearly every day  Moving or speaking so slowly that other people could have noticed  Or the opposite - being so fidgety or restless that you have been moving around a lot more than usual: 1 - several days  Thoughts that you would be better off dead, or of hurting yourself in some way: 0 - not at all            JADEN-7 Flowsheet Screening    Flowsheet Row Most Recent Value   Over the last 2 weeks, how often have you been bothered by any of the following problems? Feeling nervous, anxious, or on edge 3   Not being able to stop or control worrying 1   Worrying too much about different things 3   Trouble relaxing 3   Being so restless that it is hard to sit still 1   Becoming easily annoyed or irritable 1   Feeling afraid as if something awful might happen 1   JADEN-7 Total Score 13           Assessment/Plan:       Diagnoses and all orders for this visit:    Bipolar 2 disorder (HCC)  -     DULoxetine (CYMBALTA) 60 mg delayed release capsule; Take 1 capsule (60 mg total) by mouth daily  -     ARIPiprazole (ABILIFY) 5 mg tablet; Take 1 tablet (5 mg total) by mouth daily    Anxiety disorder, unspecified type          Diagnosis: 1  Bipolar 2 Disorder, 2   Unspecified Anxiety Disorder        22-1 y/o Female, domiciled with parents, brother (18 y/o), mat  grandmother in [place], graduated [employer] in 2021 with a bachelor's in medical microbiology, previously in master's program in public health at [address] [place] [address] in 2021 semester- withdrew after the semester, currently unemployed but 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, no significant PPH, no past psychiatric hospitalizations, no past suicide attempts, no h/o self-injurious behaviors, no h/o physical altercations, PMH significant for h/o lyme disease, gastritis, h/o esophageal candidiasis, no active substance use, [facility] on referral from gastroenterologist for psychosomatic contributions to chronic abdominal pain with patient reporting "I need help controlling the pain" with father reporting "she's had stress and anxiety "      On assessment today, patient with some improvements in mood stability since last visit, feeling less depressed and less anxiety, still having mild-moderate depressive symptoms with occasional feelings of hopelessness but has been more motivated, applying for a job, more future-oriented, in psychosocial context of strong FH of mental health problems, withdrawing from college, transitional stressors, and recently leaving a job due to severe anxiety symptoms  No current passive or active suicidal ideation, intent, or plan       On suicide risk assessment, patient with risk factors with severe depressive symptoms; however, no past suicide attempts, currently future-oriented and goal-directed, no current suicidal ideation, no access to firearms, no FH of suicide, no active substance abuse, no global insomnia or psychic anxiety   Therefore, despite risk factors, patient is not an imminent risk of harm to self and is appropriate for outpatient level of care at this time      Plan:  1  Bipolar D/o, Anxiety, Chronic Abdominal Pain- Will continue Cymbalta 60 mg daily for mood and anxiety symptoms   Will continue Abilify 5 mg daily to help with augmentation treatment for depression, mood episodes   Continue with individual psychotherapy through "[employer]" online platform   Will continue Ativan up to 1 mg daily prn severe anxiety or panic attacks  PHQ-A score of 14, moderate depression, (7/19/22), JADEN-7 score of 13, moderate anxiety (7/19/22)     2  Medical- Reviewed metabolic labwork- within normal limits (next labwork in 1/2023)   Continue to f/u with GI doctors for management of abdominal pain  F/u with PCP for on-going medical issues  3  Will transfer to resident clinic at next visit  Risks, Benefits And Possible Side Effects Of Medications:  Risks, benefits, and possible side effects of medications explained to patient and family, they verbalize understanding and Reviewed risks/benefits and side effects of antidepressant medications including black box warning on antidepressants, patient and family verbalize understanding  Psychotherapy Provided: Supportive psychotherapy provided  Counseling was provided during the session today for 16 minutes  Medications, treatment progress and treatment plan reviewed with Tenzin Desir  Recent stressor including job stress, social difficulties, everyday stressors and ongoing anxiety discussed with Tenzin Desir  Coping strategies including getting into a good routine, improving self-esteem, increasing motivation, maintain healthy diet, maintain heathy sleeping hygiene, stress reduction, spending time with family and spending time with friends reviewed with Tenzin Desir  Reassurance and supportive therapy provided  I spent 30 minutes directly with the patient during this visit    VIRTUAL VISIT DISCLAIMER      Frederic Garcia verbally agrees to participate in [employer]  Pt is aware that [employer] could be limited without vital signs or the ability to perform a full hands-on physical Phil Baker understands she or the provider may request at any time to terminate the video visit and request the patient to seek care or treatment in person

## 2022-07-25 ENCOUNTER — TELEPHONE (OUTPATIENT)
Dept: PSYCHIATRY | Facility: CLINIC | Age: 22
End: 2022-07-25

## 2022-07-25 NOTE — TELEPHONE ENCOUNTER
----- Message from Fadi Mays MD sent at 7/19/2022  4:22 PM EDT -----  Please schedule 6-8 week f/u in resident clinic to transfer to adult provider  Thanks

## 2022-07-25 NOTE — TELEPHONE ENCOUNTER
Called and LVM to call back resident line to make a FABIANA appt    LS by Renato Llanos 7/19/22 f/u for 6 to 8 weeks

## 2022-08-24 ENCOUNTER — OFFICE VISIT (OUTPATIENT)
Dept: PSYCHIATRY | Facility: CLINIC | Age: 22
End: 2022-08-24
Payer: COMMERCIAL

## 2022-08-24 VITALS — WEIGHT: 150 LBS | BODY MASS INDEX: 24.96 KG/M2

## 2022-08-24 DIAGNOSIS — F31.81 BIPOLAR 2 DISORDER (HCC): Primary | ICD-10-CM

## 2022-08-24 DIAGNOSIS — F41.9 ANXIETY DISORDER, UNSPECIFIED TYPE: ICD-10-CM

## 2022-08-24 PROCEDURE — 90792 PSYCH DIAG EVAL W/MED SRVCS: CPT | Performed by: STUDENT IN AN ORGANIZED HEALTH CARE EDUCATION/TRAINING PROGRAM

## 2022-08-24 RX ORDER — GABAPENTIN 100 MG/1
100 CAPSULE ORAL 3 TIMES DAILY
Qty: 90 CAPSULE | Refills: 0 | Status: SHIPPED | OUTPATIENT
Start: 2022-08-24 | End: 2022-09-22 | Stop reason: ALTCHOICE

## 2022-08-24 NOTE — PSYCH
[address]    Name and Date of Birth:  Karina Hooker 20 y o  2000 MRN: [id number]    Date of Visit: August 24, 2022    Reason for visit: Transfer of Care Psychiatric Evaluation     Chief complaint:  I still feel anxious, dealing with a lot of stress  History of Present Illness (HPI):      Karina Hooker is a 20 y o , , female, domiciled with parents, brother [de-identified]24 y/o), mat  grandmother in [place], graduated from [place] in 5/2021 with a bachelor's in medical microbiology, started a [employer] program in [address] [place] [address] at [address] [place] [address] in fall 2021 semester- withdrew after the semester, currently unemployed, no significant PPH, no past psychiatric hospitalizations, no past suicide attempts, no h/o self-injurious behaviors, no h/o physical altercations, PMH significant for h/o lyme disease, gastritis, h/o esophageal candidiasis, daily medical marijuana use, presenting to the [address] [place] [address] outpatient clinic [facility] for Transfer of Care which includes psychiatric evaluation, medication management and psychoththerapy  At previous visit, was complaining of abdominal pain of somatic symptoms and anxiety in the setting of increased stress, which has been improving  Harley Janna states that she continues to have anxiety  This anxiety still continues and is bothersome for her  She is living at home in a stressful environment, with parents and grandmother, and explains that it is close quarters and while it is safe, he can be overly stressful and emotional at times  She is also applying to jobs, with an interest in microbiology and working in a lab, and is discouraged because she is having difficulty obtaining a job  She spends her days applying to various laboratories in the [facility] area      She is proud to explain that since leaving here diversity at [place], she has not had much alcohol abuse  She explains that she was drinking every night of the week at [facility], but now that she lives at home with family, she does not partake in this as much, only social drinking  She explained that her last panic attack was in May when she was in college and drinking  All of her manic episodes for during a period when she was under the influence of alcohol  The last manic episode she was awake for 3 days, drinking alcohol, very minimal sleep, cleaning the house and flight of ideas, felt on top of the world, doing numerous tasks inside and outside the home  She denies series cravings for alcohol, she is in agreement with the initiation of gabapentin 100 mg t i d  for the prevention of alcohol cravings that may potentially rise and generalized anxiety  She feels as if the anxiety is her biggest stressor, that is much depression  She has been compliant with medications and is in agreement with continuing medications that have already been prescribed  Denies medication side effects  While her somatic symptoms continue, she feels that they are improving with regular exercise and communication with family members, in addition to medications  Insomnia has improved in addition to depressive and anxiety symptoms since last visit, but she would like further control  Reports her daily medical marijuana use helps keep her calm and eating enough food, otherwise she would restrict more  In free time, she applies jobs and enjoys painting by numbers, among various other artistic activities  Presently, patient denies suicidal/homicidal ideation in addition to thoughts of self-injury; contracts for safety, see below for risk assessment    At conclusion of evaluation, patient is amenable to the recommendations of this writer including:  Initiate gabapentin 100 mg t i d  for generalized anxiety, in addition to continuation of duloxetine 60 milligrams daily, Abilify 5 milligrams daily, and Ativan 1 milligram p r n  for panic attacks  Also, patient is amenable to calling/contacting the outpatient office including this writer if any acute adverse effects of their medication regimen arise in addition to any comments or concerns pertaining to their psychiatric management  Patient is amenable to calling/contacting crisis and/or attending to the nearest emergency department if their clinical condition deteriorates to assure their safety and stability, stating that they are able to appropriately confide in their grandmother, household members regarding their psychiatric state  Current Rating Scores:     Current PHQ-9   PHQ-2/9 Depression Screening    Little interest or pleasure in doing things: 1 - several days  Feeling down, depressed, or hopeless: 0 - not at all  Trouble falling or staying asleep, or sleeping too much: 3 - nearly every day  Feeling tired or having little energy: 1 - several days  Poor appetite or overeating: 3 - nearly every day  Feeling bad about yourself - or that you are a failure or have let yourself or your family down: 1 - several days  Trouble concentrating on things, such as reading the newspaper or watching television: 1 - several days  Moving or speaking so slowly that other people could have noticed  Or the opposite - being so fidgety or restless that you have been moving around a lot more than usual: 2 - more than half the days  Thoughts that you would be better off dead, or of hurting yourself in some way: 0 - not at all  PHQ-9 Score: 12   PHQ-9 Interpretation: Moderate depression        Current JADEN-7 is   JADEN-7 Flowsheet Screening    Flowsheet Row Most Recent Value   Over the last 2 weeks, how often have you been bothered by any of the following problems?     Feeling nervous, anxious, or on edge 2   Not being able to stop or control worrying 1   Worrying too much about different things 2   Trouble relaxing 2   Being so restless that it is hard to sit still 3   Becoming easily annoyed or irritable 3   Feeling afraid as if something awful might happen 1   JADEN-7 Total Score 14        Psychiatric Review Of Systems:    Appetite: decreased  Adverse eating: no  Weight changes: no  Insomnia/sleeplessness: difficulty staying asleep, no trouble falling, due to anxiety  Fatigue/anergy: no, no change  Anhedonia/lack of interest: slight increase  Attention/concentration: no  Psychomotor agitation/retardation: restless at times, intermittent "feeling of wanting to move body"  Somatic symptoms: abdominal pain, francis after meals  Anxiety/panic attack: worrying daily, anxiety 1/3 of day, generalized  July/hypomania: no, past manic episodes last in may 2022  Hopelessness/helplessness/worthlessness: no  Self-injurious behavior/high-risk behavior: no  Suicidal ideation: no  Homicidal ideation: no  Auditory hallucinations: no  Visual hallucinations: no  Other perceptual disturbances: no  Delusional thinking: no  Obsessive/compulsive symptoms: no    Review Of Systems:    Constitutional negative   ENT negative   Cardiovascular negative   Respiratory negative   Gastrointestinal negative   Genitourinary negative   Musculoskeletal negative   Integumentary negative   Neurological negative   Endocrine negative   Other Symptoms none, all other systems are negative       Family Psychiatric History:     Family History   Problem Relation Age of Onset    Anxiety disorder Mother     Bipolar disorder Father     Sleep disorder Father     Anxiety disorder Maternal Grandmother     Hyperlipidemia Maternal Grandmother     Hypertension Maternal Grandmother     Thyroid disease Maternal Grandmother     Kidney disease Paternal Grandmother     Lung cancer Maternal Grandfather     Prostate cancer Paternal Grandfather     Substance Abuse Neg Hx      Denies substance abuse or suicidality in immediate relations       Past Psychiatric History:  (unchanged information from previous note copied and italicized) - Information that is bolded has been updated  No significant PPH, no past psychiatric hospitalizations, no past suicide attempts, no h/o self-injurious behaviors, teen superficial cutting 2 months in setting of anxiety, jan 2022 ago cut again due to dropping out of masters program because disliked it, no h/o physical altercations  Currently in outpatient therapy through [facility] virtual therapy on weekly basis  Past Med Trials: Fluoxetine up to 40 mg daily (ineffective), Seroquel XR up to 150 mg qhs (drowsiness)      Family Psychiatric History:  (unchanged information from previous note copied and italicized) - Information that is bolded has been updated  Father- Bipolar I d/o (on Prozac, Seroquel)  Pat  Great Grandmother- Schizophrenia  Mother- Panic Attacks (Xanax, previously on Zoloft)  Mat  Grandmother- Anxiety  Maternal Side- Chronic abdominal pain     No FH of suicide      Social History:  (unchanged information from previous note copied and italicized) - Information that is bolded has been updated  Domiciled with parents, brother (15 y/o) in [facility]  Mother employed in [employer], father employed as pharmaceutical consultant  Reports infrequent caffeine use  No active substance use  Reports that she broke up with boyfriend in 2/2021   No current relationships      Substance Abuse History:  (unchanged information from previous note copied and italicized) - Information that is bolded has been updated  Alcohol- drinking occasionally on weekends, a couple of drinks, about once per week, limited alcohol use  Cannabis- occasional use, about once per month, currently using medical marijuana on daily basis, has medical MJ card and smokes daily (says its for anxiety and appetite stimulant)     Traumatic History:  (unchanged information from previous note copied and italicized) - Information that is bolded has been updated  Denies any h/o physical or sexual abuse   The following portions of the patient's history were reviewed and updated as appropriate: allergies, current medications, past family history, past medical history, past social history, past surgical history and problem list     Past Medical History:    Past Medical History:   Diagnosis Date    Anemia     Anxiety     Asthma     hx of as child - outgrew     Depression     Exertional shortness of breath     Resolved 12/23/2015     Gastritis     Gastroparesis     Resolved 10/21/2016     GERD (gastroesophageal reflux disease)     Lyme disease     Syncope     Resolved 11/18/2015         Past Surgical History:   Procedure Laterality Date    ESOPHAGOGASTRODUODENOSCOPY      EGD with bipsy    UPPER GASTROINTESTINAL ENDOSCOPY       Allergies   Allergen Reactions    Pineapple - Food Allergy Anaphylaxis    Penicillins Hives and Vomiting       History Review:     The following portions of the patient's history were reviewed and updated as appropriate: allergies, current medications, past family history, past medical history, past social history, past surgical history and problem list     OBJECTIVE:    Vital signs in last 24 hours:    Vitals:    08/24/22 0851   Weight: 68 kg (150 lb)       Mental Status Evaluation:    Appearance age appropriate, casually dressed   Behavior cooperative, calm, constricted at times   Speech normal rate, normal volume, normal pitch   Mood "fine, slight anxiety"   Affect constricted   Thought Processes organized, goal directed   Associations intact associations   Thought Content no overt delusions   Perceptual Disturbances: no auditory hallucinations, no visual hallucinations   Abnormal Thoughts  Risk Potential Suicidal ideation - None  Homicidal ideation - None  Potential for aggression - No   Orientation oriented to person, place, time/date and situation   Memory recent and remote memory grossly intact   Consciousness alert and awake   Attention Span Concentration Span attention span and concentration are age appropriate   Intellect appears to be of average intelligence   Insight intact   Judgement intact   Muscle Strength and  Gait normal muscle strength and normal muscle tone, normal gait and normal balance   Motor Activity no abnormal movements   Language no difficulty naming common objects, no difficulty repeating a phrase, no difficulty writing a sentence   Fund of Knowledge adequate knowledge of current events  adequate fund of knowledge regarding past history  adequate fund of knowledge regarding vocabulary    Pain none   Pain Scale 0       Laboratory Results: I have personally reviewed all pertinent laboratory/tests results    Recent Labs (last 6 months):   Appointment on 07/19/2022   Component Date Value    PTH 07/19/2022 30 2     WBC 07/19/2022 8 19     RBC 07/19/2022 4 60     Hemoglobin 07/19/2022 13 3     Hematocrit 07/19/2022 41 9     MCV 07/19/2022 91     MCH 07/19/2022 28 9     MCHC 07/19/2022 31 7     RDW 07/19/2022 13 0     MPV 07/19/2022 10 1     Platelets 71/17/0054 286     nRBC 07/19/2022 0     Neutrophils Relative 07/19/2022 50     Immat GRANS % 07/19/2022 0     Lymphocytes Relative 07/19/2022 39     Monocytes Relative 07/19/2022 7     Eosinophils Relative 07/19/2022 3     Basophils Relative 07/19/2022 1     Neutrophils Absolute 07/19/2022 4 16     Immature Grans Absolute 07/19/2022 0 03     Lymphocytes Absolute 07/19/2022 3 16     Monocytes Absolute 07/19/2022 0 53     Eosinophils Absolute 07/19/2022 0 24     Basophils Absolute 07/19/2022 0 07     Sodium 07/19/2022 140     Potassium 07/19/2022 4 0     Chloride 07/19/2022 110 (A)    CO2 07/19/2022 25     ANION GAP 07/19/2022 5     BUN 07/19/2022 8     Creatinine 07/19/2022 0 87     Glucose, Fasting 07/19/2022 91     Calcium 07/19/2022 8 8     Corrected Calcium 07/19/2022 9 3     AST 07/19/2022 15     ALT 07/19/2022 18     Alkaline Phosphatase 07/19/2022 68     Total Protein 07/19/2022 7 5     Albumin 07/19/2022 3 4 (A)    Total Bilirubin 07/19/2022 0 26     eGFR 07/19/2022 94     Hemoglobin A1C 07/19/2022 5 2     EAG 07/19/2022 103     TSH 3RD GENERATON 07/19/2022 2 460     Cholesterol 07/19/2022 173     Triglycerides 07/19/2022 63     HDL, Direct 07/19/2022 68     LDL Calculated 07/19/2022 92     Non-HDL-Chol (CHOL-HDL) 07/19/2022 105        Suicide/Homicide Risk Assessment:    Risk of Harm to Self:  The following ratings are based on assessment at the time of the interview  Demographic risk factors include: never , age: young adult (15-24)  Historical Risk Factors include: chronic psychiatric problems, history of depression, chronic mood disorder, alcohol use, substance use, history of substance use  Recent Specific Risk Factors include: diagnosis of mood disorder, mental illness diagnosis, current anxiety symptoms  Protective Factors: no current suicidal ideation, able to manage anger well, effective coping skills, medical compliance, sense of determination, sense of importance of health and wellness, sense of personal control  Weapons: none  The following steps have been taken to ensure weapons are properly secured: not applicable  Based on today's assessment, Seth Thomason presents the following risk of harm to self: minimal    Risk of Harm to Others: The following ratings are based on assessment at the time of the interview  Demographic Risk Factors include: [age] years of age, under age 35  Historical Risk Factors include: alcohol abuse  Recent Specific Risk Factors include: multiple stressors  Protective Factors: no current homicidal ideation, effective problem solving skills, resilience, safe and stable living environment, supportive family  Weapons: none  The following steps have been taken to ensure weapons are properly secured: not applicable  Based on today's assessment, Champ Katelin presents the following risk of harm to others: minimal    The following interventions are recommended: no intervention changes needed  Although patient's acute lethality risk is low, long-term/chronic lethality risk is mildly elevated in the presence of bipolar 2 disorder and anxiety disorder  At the current moment, Tess Fountain is future-oriented, forward-thinking, and demonstrates ability to act in a self-preserving manner as evidenced by volitionally presenting to the clinic today, seeking treatment  At this juncture, inpatient hospitalization is not currently warranted  To mitigate future risk, patient should adhere to the recommendations of this writer, avoid alcohol/illicit substance use, utilize community-based resources and familiar support and prioritize mental health treatment  Based on today's assessment and clinical criteria, Nupur Stahl contracts for safety and is not an imminent risk of harm to self or others  Outpatient level of care is deemed appropriate at this present time  Tess Fountain understands that if they are no longer able to contract for safety, they need to call/contact the outpatient office including this writer, call/contact crisis and/orattend to the nearest Emergency Department for immediate evaluation  Assessment/Plan:     Nupur Stahl is a 20 y o , , female, domiciled with parents, brother [de-identified]22 y/o), mat  grandmother in [place], graduated from [place] in 5/2021 with a bachelor's in medical microbiology, started a [employer] program in [address] [place] [address] at [address] [place] [address] in fall 2021 semester- withdrew after the semester, currently unemployed, no significant PPH, no past psychiatric hospitalizations, no past suicide attempts, no h/o self-injurious behaviors, no h/o physical altercations, PMH significant for h/o lyme disease, gastritis, h/o esophageal candidiasis, daily medical marijuana use, presenting to the [address] [place] [address] [address] outpatient clinic [facility] for Transfer of Care which includes psychiatric evaluation, medication management and psychoththerapy   At previous visit, was complaining of abdominal pain of somatic symptoms and anxiety in the setting of increased stress, which has been improving  Anthony Shen states that she continues to have anxiety  This anxiety still continues and is bothersome for her  She is living at home in a stressful environment, with parents and grandmother, and explains that it is close quarters and while it is safe, he can be overly stressful and emotional at times  She is also applying to jobs, with an interest in microbiology and working in a lab, and is discouraged because she is having difficulty obtaining a job  She spends her days applying to various laboratories in the [facility] area  She is proud to explain that since leaving here diversity at [facility], she has not had much alcohol abuse  She explains that she was drinking every night of the week at [facility], but now that she lives at home with family, she does not partake in this as much, only social drinking  She explained that her last panic attack was in May when she was in college and drinking  All of her manic episodes for during a period when she was under the influence of alcohol  The last manic episode she was awake for 3 days, drinking alcohol, very minimal sleep, cleaning the house and flight of ideas, felt on top of the world, doing numerous tasks inside and outside the home  She denies series cravings for alcohol, she is in agreement with the initiation of gabapentin 100 mg t i d  for the prevention of alcohol cravings that may potentially rise and generalized anxiety  She feels as if the anxiety is her biggest stressor, moreso than depression  While her somatic symptoms continue, she feels that they are improving with regular exercise and communication with family members, in addition to medications  Insomnia has improved in addition to depressive and anxiety symptoms since last visit, but she would like further control    Reports her daily medical marijuana use helps keep her calm and eating enough food, otherwise she would restrict more  In free time, she applies jobs and enjoys painting by numbers, among various other artistic activities  Presently, patient denies suicidal/homicidal ideation in addition to thoughts of self-injury; contracts for safety, see below for risk assessment  She is in agreement with the below adjustments to medications  DSM-5 Diagnoses:     Encounter Diagnoses   Name Primary?  Bipolar 2 disorder (HCC) Yes    Anxiety disorder, unspecified type            Treatment Recommendations/Precautions:   Continue Cymbalta 60 mg daily for anxiety, depression, mood stability   Continue Abilify 5 mg daily for mood stability, as augmentation to Cymbalta   Initiate gabapentin 100 mg t i d  for generalized anxiety, occasional cravings for alcohol   Continue Ativan 1 mg daily for JADEN and panic attack prevention  o States she has been using 1 pill every 2 week for panic attacks   Medical   o Follow up with primary care physician for ongoing medical care    Currently with therapist at [facility] help; would like [phone] [facility]   Referral for individual psychotherapy   Follows with family physician for glucose and lipid monitoring due to current therapy with antipsychotic medication   Aware of 24 hour and weekend coverage for urgent situations accessed by calling [facility] main practice number       Medications Risks/Benefits:      Risks, Benefits And Possible Side Effects Of Medications:    Risks, benefits, and possible side effects of medications explained to [facility] and she verbalizes understanding and agreement for treatment  including: PARQ for neurontin including depression/suicidality, allergic reactions (SJS, angioedema, rhabdomyolysis, eosinophilia, anaphylaxis), dizziness and somnolence, diarrhea, xerostomia, headaches, drug interactions and others     PARQ completed including serotonin syndrome, SIADH, worsened depression/suicidality, induction of fco, blood pressure changes and GI distress, weight gain, sexual side effects, insomnia, sedation, potential for drug interactions, and others  PARQ for neurontin including depression/suicidality, allergic reactions (SJS, angioedema, rhabdomyolysis, eosinophilia, anaphylaxis), dizziness and somnolence, diarrhea, xerostomia, headaches, drug interactions and others  Discussed with patient the risks of sedation, respiratory depression, impairment in judgment and motor function  Depression, mood changes, GI changes, and others discussed  Patient was also informed of risks of being on or starting opioid medications due to drug interactions and potential for serious respiratory depression and death  Controlled Medication Discussion:     [employer] has been filling controlled prescriptions on time as prescribed according to [place] Prescription Drug Monitoring Program    Treatment Plan:    Completed and signed during the session: Yes - Treatment Plan done but not signed at time of office visit due to:  Plan reviewed in person and verbal consent given due to [address] distancing    This note was not shared with the patient due to reasonable likelihood of causing patient harm      Psychotherapy Provided: Supportive psychotherapy provided  Medications, treatment progress and treatment plan reviewed with [employer]  Recent stressor including job stress, social difficulties, everyday stressors and ongoing anxiety discussed with [employer]  Coping strategies including getting into a good routine, improving self-esteem, increasing motivation, maintain healthy diet, maintain heathy sleeping hygiene, stress reduction, spending time with family and spending time with friends reviewed with [employer]  Reassurance and supportive therapy provided       Alpesh Umanzor MD 08/24/22

## 2022-08-24 NOTE — BH TREATMENT PLAN
TREATMENT PLAN (Medication Management Only)        [facility]    Name and Date of Birth:  Richard Sunshine 20 y o  2000  Date of Treatment Plan: August 24, 2022  Diagnosis/Diagnoses:    1  Bipolar 2 disorder ([address] )    2  Anxiety disorder, unspecified type      Strengths/Personal Resources for Self-Care: supportive family, supportive friends, taking medications as prescribed, ability to adapt to life changes, ability to communicate needs, ability to listen, ability to reason  Area/Areas of need (in own words): anxiety symptoms, depressive symptoms  1  Long Term Goal: continue improvement in anxiety  Target Date: 2 months  Person/Persons responsible for completion of goal: Grace  2  Short Term Objective (s) - How will we reach this goal?:   A  Provider new recommended medication/dosage changes and/or continue medication(s): continue current medications as prescribed  Sai ma healthy diet   Yvonne Fowler all scheduled appointments  Target Date:4 weeks - 9/21/2022  Person/Persons Responsible for Completion of Goal: Grace  Progress Towards Goals: starting treatment, continuing treatment  Treatment Modality: medication management every 4 months, continue psychotherapy with own therapist  Review due 180 days from date of this plan: 6 months - 2/24/2023  Expected length of service: ongoing treatment  My Physician/PA/NP and I have developed this plan together and I agree to work on the goals and objectives  I understand the treatment goals that were developed for my treatment

## 2022-09-22 ENCOUNTER — TELEPHONE (OUTPATIENT)
Dept: PSYCHIATRY | Facility: CLINIC | Age: 22
End: 2022-09-22

## 2022-09-22 ENCOUNTER — TELEMEDICINE (OUTPATIENT)
Dept: PSYCHIATRY | Facility: CLINIC | Age: 22
End: 2022-09-22
Payer: COMMERCIAL

## 2022-09-22 DIAGNOSIS — F41.9 ANXIETY DISORDER, UNSPECIFIED TYPE: ICD-10-CM

## 2022-09-22 DIAGNOSIS — G47.00 INSOMNIA, UNSPECIFIED TYPE: ICD-10-CM

## 2022-09-22 DIAGNOSIS — F33.1 MDD (MAJOR DEPRESSIVE DISORDER), RECURRENT EPISODE, MODERATE (HCC): Primary | Chronic | ICD-10-CM

## 2022-09-22 PROCEDURE — 99214 OFFICE O/P EST MOD 30 MIN: CPT | Performed by: PSYCHIATRY & NEUROLOGY

## 2022-09-22 RX ORDER — TRAZODONE HYDROCHLORIDE 50 MG/1
50 TABLET ORAL
Qty: 30 TABLET | Refills: 1 | Status: SHIPPED | OUTPATIENT
Start: 2022-09-22 | End: 2022-10-22

## 2022-09-22 NOTE — PATIENT INSTRUCTIONS
Stop taking gabapentin  Start taking trazodone 50 mg every night (initially, you can break the pill in half and take 25 mg nightly  If tolerating and doing okay on this dose, can increase to trazodone 50 mg nightly)  Put patient on therapy wait list     We placed an order for vitamin-D level that you can get at a [place] [address] lab  Vitamin-D level can affect sleep, so we would like to measure the level  Recommendations regarding insomnia:  Wake-up at the same time every day  Refrain from "napping"  Refrain from going to bed unless you're tired  Utilize your bedroom for sleep only  Avoid use of electronics including television and/or cellphone/computers  Refrain from use of electronics including television and/or cellphones/computers prior to bed  Turn your alarm clock away so the light is not visible  Attempt relaxation using various means like reading if you're restless in bed for approximately 15-20 minutes  Participate in regular physical activities like exercise, although avoid approximately 3-4 hours prior to bed  Morning exercise is ideal   Avoid caffeine use prior to bedtime  Consider tapering down excessive use of caffeine  Avoid tobacco use prior to bedtime  Avoid alcohol use prior to bedtime  Consider reading "No More Sleepless nights" by Delilah Vora, Ph D   Consider use of online resources including:  http://[employer]/cbt-online-insomnia-treatment html  [employer]  com  CBT-I   Go! To Sleep by the [facility]  Please present for your previously scheduled appointment approximately 15 minutes prior to appointment time  If you are running late or are unable to attend your appointment, please call our [place] office at [phone] or our [place] office at [phone] if applicable to notify the office of your absence      If you are in psychological crisis including not limited to suicidal/homicidal thoughts or thoughts of self-injury, do not hesitate to call/contact your [employer] (see below) or attend to the nearest emergency department    [facility] Crisis: [address] Crisis: [phone]  [address] Crisis: [address] Crisis: [address] Crisis: [address] Crisis: [address] [facility] Crisis: [phone]  National Suicide Prevention Hotline: 6-214.431.8488

## 2022-09-22 NOTE — PSYCH
Virtual Visit Disclaimer & Required Documentation  TeleMed provider: Vince Munroe MD  And Jethro March MD, located at [facility], [address] in [place], [place], Psychiatric hospital  The patient is also located in [place] which is the [facility] in which I hold an active license  The patient was identified by name and date of birth  Estela Valerio was informed that this is a telemedicine visit and that the visit is being conducted through [facility] and patient was informed this is a secure, HIPAA-complaint platform  She agrees to proceed  My office door was closed  No one else was in the room  She acknowledged consent and understanding of privacy and security of the video platform  The patient has agreed to participate and understands they can discontinue the visit at any time  Estela Valerio verbally agrees to participate in [employer]  Pt is aware that [employer] could be limited without vital signs or the ability to perform a full hands-on physical exam  The patient understands she or the provider may request at any time to terminate the video visit and request the patient to seek care or treatment in person  Patient is aware this is a billable service            MEDICATION MANAGEMENT NOTE        [facility]      Name and Date of Birth:  Estela Valerio 20 y o  2000 MRN: [id number]    Date of Visit: September 22, 2022    Reason for Visit: Follow-up visit for medication management     SUBJECTIVE:    Estela Valerio is a 20 y o , , female, domiciled with parents, brother (18 y/o), mat  grandmother in [place], graduated [employer] [address] [employer] [employer] [address] [address] in 5/2021 with a bachelor's in medical microbiology, started a master's program in public health at [address] [place] [address] in fall 2021 semester- withdrew after the semester, currently unemployed, no significant PPH, no past psychiatric hospitalizations, no past suicide attempts, no h/o self-injurious behaviors, no h/o physical altercations, PMH significant for h/o lyme disease, gastritis, h/o esophageal candidiasis, daily medical marijuana use, and history of heavy alcohol use in college, who was personally seen and evaluated today at the [address] [place] [address] E outpatient clinic for follow-up and medication management  Burke Velasco presents reporting that her anxiety and depression remain relatively constant since last visit, when she was started on gabapentin 100 mg 3 times a day  She states this medication is not effective and makes her feel tired, and she often misses dosages because it is difficult to remember taking it 3 times a day  She would like a medication that is once daily  She states she has been applying for jobs after graduating from [employer] and while she gets interviews, 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  This is stressful, and she hopes moving forward things end up working out  Her therapy with better help is affective, but she would like to be put on the [facility] therapy wait list   She also states that while living at home can be stressful and boring, now that she is out of college and living with mother and father again, she feels safe at home, contracts for safety, gets along with parents  Her main concern at this visit is insomnia, explaining that she has trouble falling asleep, but even more trouble staying asleep  She wakes up and cannot get back to bed so she stays awake, sometimes with racing thoughts, and believes this has resulted in irritability during the daytime  She states her Cymbalta 60 mg daily and Abilify 5 mg daily are effective and she likes this combination  She has not been taking Ativan  She is interested in initiating a medication for sleep, specifically she is agreeable to trazodone 50 mg daily (will take 25 mg for 1st few days)    While on this med, she will monitor her irritability, anxiety, and depression to see if it improves with increased sleep  She denies SI, HI, AVH, manic episodes, changes in appetite, somatic symptoms, panic attacks  She endorses some restlessness and fidgety feelings at times, but otherwise, denies feeling any july  Denies binge eating or food restriction  Denies weight changes  Psychiatric Review Of Systems:  Unchanged information from this writer's previous assessment is copied and italicized; information that has changed is bolded      Appetite: varies, previously decreased  Adverse eating: no  Weight changes: no  Insomnia/sleeplessness: difficulty staying asleep, no trouble falling  Fatigue/anergy: no, no change  Anhedonia/lack of interest: increased slightly  Attention/concentration: no  Psychomotor agitation/retardation: restless at times  Somatic symptoms: denies, previously yes after meals  Anxiety/panic attack: worrying daily, generalized  July/hypomania: no, past manic episodes last in may 2022  Hopelessness/helplessness/worthlessness: no  Self-injurious behavior/high-risk behavior: no  Suicidal ideation: no  Homicidal ideation: no  Auditory hallucinations: no  Visual hallucinations: no  Other perceptual disturbances: no  Delusional thinking: no  Obsessive/compulsive symptoms: no    Current Rating Scores:     PHQ-9 was 12  - Prior 12     JADEN-7 was 16  - Prior 14    Current PHQ-9   PHQ-2/9 Depression Screening    Little interest or pleasure in doing things: 1 - several days  Feeling down, depressed, or hopeless: 1 - several days  Trouble falling or staying asleep, or sleeping too much: 3 - nearly every day  Feeling tired or having little energy: 3 - nearly every day  Poor appetite or overeatin - more than half the days  Feeling bad about yourself - or that you are a failure or have let yourself or your family down: 0 - not at all  Trouble concentrating on things, such as reading the newspaper or watching television: 0 - not at all  Moving or speaking so slowly that other people could have noticed  Or the opposite - being so fidgety or restless that you have been moving around a lot more than usual: 2 - more than half the days  Thoughts that you would be better off dead, or of hurting yourself in some way: 0 - not at all  PHQ-9 Score: 12   PHQ-9 Interpretation: Moderate depression        Current JADEN-7 is   JADEN-7 Flowsheet Screening    Flowsheet Row Most Recent Value   Over the last 2 weeks, how often have you been bothered by any of the following problems? Feeling nervous, anxious, or on edge 2   Not being able to stop or control worrying 1   Worrying too much about different things 2   Trouble relaxing 3   Being so restless that it is hard to sit still 3   Becoming easily annoyed or irritable 3   Feeling afraid as if something awful might happen 2   JADEN-7 Total Score 16        Review Of Systems:      Constitutional negative   ENT negative   Cardiovascular negative   Respiratory negative   Gastrointestinal negative   Genitourinary negative   Musculoskeletal negative   Integumentary negative   Neurological negative   Endocrine negative   Other Symptoms none, all other systems are negative     Family Psychiatric History: (unchanged information from previous note copied and italicized) - Information that is bolded has been updated       Family History   Problem Relation Age of Onset    Anxiety disorder Mother     Bipolar disorder Father     Sleep disorder Father     Anxiety disorder Maternal Grandmother     Hyperlipidemia Maternal Grandmother     Hypertension Maternal Grandmother     Thyroid disease Maternal Grandmother     Kidney disease Paternal Grandmother     Lung cancer Maternal Grandfather     Prostate cancer Paternal Grandfather     Substance Abuse Neg Hx      Denies substance abuse or suicidality in immediate relations         Past Psychiatric History:  (unchanged information from previous note copied and italicized) - Information that is bolded has been updated  No significant PPH, no past psychiatric hospitalizations, no past suicide attempts, no h/o self-injurious behaviors, teen superficial cutting 2 months in setting of anxiety, jan 2022 ago cut again due to dropping out of masters program because disliked it, no h/o physical altercations  Currently in outpatient therapy through [facility] virtual therapy on weekly basis      Past Med Trials: Fluoxetine up to 40 mg daily (ineffective), Seroquel XR up to 150 mg qhs (drowsiness), gabapentin (not effective hard to remember doses)       Family Psychiatric History:  (unchanged information from previous note copied and italicized) - Information that is bolded has been updated  Father- Bipolar I d/o (on Prozac, Seroquel)  Pat  Great Grandmother- Schizophrenia  Mother- Panic Attacks (Xanax, previously on Zoloft)  Mat  Grandmother- Anxiety  Maternal Side- Chronic abdominal pain     No FH of suicide        Social History:  (unchanged information from previous note copied and italicized) - Information that is bolded has been updated  Domiciled with parents, brother (15 y/o) in [place]  Mother employed in [employer], father employed as pharmaceutical consultant  Reports infrequent caffeine use  No active substance use  Reports that she broke up with boyfriend in 2/2021   No current relationships        Substance Abuse History:  (unchanged information from previous note copied and italicized) - Information that is bolded has been updated  Alcohol- drinking occasionally on weekends, a couple of drinks, about once per week, limited alcohol use  Cannabis- occasional use, about once per month, currently using medical marijuana on daily basis, has medical MJ card and smokes daily (says its for anxiety and appetite stimulant)       Traumatic History:  (unchanged information from previous note copied and italicized) - Information that is bolded has been updated       Denies any h/o physical or sexual abuse   The following portions of the patient's history were reviewed and updated as appropriate: allergies, current medications, past family history, past medical history, past social history, past surgical history and problem list         Past Medical History:    Past Medical History:   Diagnosis Date    Anemia     Anxiety     Asthma     hx of as child - outgrew     Depression     Exertional shortness of breath     Resolved 12/23/2015     Gastritis     Gastroparesis     Resolved 10/21/2016     GERD (gastroesophageal reflux disease)     Lyme disease     Syncope     Resolved 11/18/2015         Past Surgical History:   Procedure Laterality Date    ESOPHAGOGASTRODUODENOSCOPY      EGD with bipsy    UPPER GASTROINTESTINAL ENDOSCOPY       Allergies   Allergen Reactions    Pineapple - Food Allergy Anaphylaxis    Penicillins Hives and Vomiting       Substance Abuse History:    Social History     Substance and Sexual Activity   Alcohol Use Yes    Comment: social     Social History     Substance and Sexual Activity   Drug Use Yes    Types: Marijuana    Comment: medical marijuana - smokes        Social History:    Social History     Socioeconomic History    Marital status: Single     Spouse name: Not on file    Number of children: Not on file    Years of education: Not on file    Highest education level: Not on file   Occupational History    Not on file   Tobacco Use    Smoking status: Never Smoker    Smokeless tobacco: Never Used    Tobacco comment: medical marijuana   Vaping Use    Vaping Use: Never used   Substance and Sexual Activity    Alcohol use: Yes     Comment: social    Drug use: Yes     Types: Marijuana     Comment: medical marijuana - smokes     Sexual activity: Yes     Partners: Male     Birth control/protection: OCP   Other Topics Concern    Not on file   Social History Narrative    Caffeine use    Currently in college    Poor dental hygiene    Tea      Social Determinants of Health     Financial Resource Strain: Not on file   Food Insecurity: Not on file   Transportation Needs: Not on file   Physical Activity: Not on file   Stress: Not on file   Social Connections: Not on file   Intimate Partner Violence: Not on file   Housing Stability: Not on file         History Review: The following portions of the patient's history were reviewed and updated as appropriate: allergies, current medications, past family history, past medical history, past social history, past surgical history and problem list          OBJECTIVE:   The  Vital signs in last 24 hours: There were no vitals filed for this visit      Mental Status Evaluation:    Appearance appears stated age and casually dressed   Behavior calm, cooperative and at times constricted   Speech normal rate, normal volume, normal pitch   Mood "tired, irratable"   Affect less constricted than previous visits   Thought Processes organized, goal directed   Associations intact associations   Thought Content no overt delusions   Perceptual Disturbances: no auditory hallucinations, no visual hallucinations   Abnormal Thoughts  Risk Potential Denies suicidal or homicidal ideation   Orientation oriented to person, place, time/date and situation   Memory recent and remote memory grossly intact   Consciousness alert and awake   Attention Span Concentration Span attention span and concentration are age appropriate   Intellect appears to be of average intelligence   Insight intact   Judgement intact   Muscle Strength and  Gait normal muscle strength and normal muscle tone, unable to assess today due to virtual visit   Motor activity no abnormal movements   Language unable to assess writing today due to virtual visit   Fund of Knowledge adequate knowledge of current events  adequate fund of knowledge regarding past history  adequate fund of knowledge regarding vocabulary        Laboratory Results: I have personally reviewed all pertinent laboratory/tests results    Recent Labs (last 4 months):   Appointment on 07/19/2022   Component Date Value    PTH 07/19/2022 30 2     WBC 07/19/2022 8 19     RBC 07/19/2022 4 60     Hemoglobin 07/19/2022 13 3     Hematocrit 07/19/2022 41 9     MCV 07/19/2022 91     MCH 07/19/2022 28 9     MCHC 07/19/2022 31 7     RDW 07/19/2022 13 0     MPV 07/19/2022 10 1     Platelets 02/71/7559 286     nRBC 07/19/2022 0     Neutrophils Relative 07/19/2022 50     Immat GRANS % 07/19/2022 0     Lymphocytes Relative 07/19/2022 39     Monocytes Relative 07/19/2022 7     Eosinophils Relative 07/19/2022 3     Basophils Relative 07/19/2022 1     Neutrophils Absolute 07/19/2022 4 16     Immature Grans Absolute 07/19/2022 0 03     Lymphocytes Absolute 07/19/2022 3 16     Monocytes Absolute 07/19/2022 0 53     Eosinophils Absolute 07/19/2022 0 24     Basophils Absolute 07/19/2022 0 07     Sodium 07/19/2022 140     Potassium 07/19/2022 4 0     Chloride 07/19/2022 110 (A)    CO2 07/19/2022 25     ANION GAP 07/19/2022 5     BUN 07/19/2022 8     Creatinine 07/19/2022 0 87     Glucose, Fasting 07/19/2022 91     Calcium 07/19/2022 8 8     Corrected Calcium 07/19/2022 9 3     AST 07/19/2022 15     ALT 07/19/2022 18     Alkaline Phosphatase 07/19/2022 68     Total Protein 07/19/2022 7 5     Albumin 07/19/2022 3 4 (A)    Total Bilirubin 07/19/2022 0 26     eGFR 07/19/2022 94     Hemoglobin A1C 07/19/2022 5 2     EAG 07/19/2022 103     TSH 3RD GENERATON 07/19/2022 2 460     Cholesterol 07/19/2022 173     Triglycerides 07/19/2022 63     HDL, Direct 07/19/2022 68     LDL Calculated 07/19/2022 92     Non-HDL-Chol (CHOL-HDL) 07/19/2022 105        Suicide/Homicide Risk Assessment:    Risk of Harm to Self:  · The following ratings are based on assessment at the time of the interview  · Demographic risk factors include: never , age: young adult (15-24)  · Historical Risk Factors include: chronic psychiatric problems, history of depression, chronic mood disorder, alcohol use, substance use, history of substance use  · Recent Specific Risk Factors include: diagnosis of mood disorder, mental illness diagnosis, current anxiety symptoms  · Protective Factors: no current suicidal ideation, able to manage anger well, effective coping skills, medical compliance, sense of determination, sense of importance of health and wellness, sense of personal control  · Weapons: none  The following steps have been taken to ensure weapons are properly secured: not applicable  · Based on today's assessment, Marcella Hull presents the following risk of harm to self: minimal     Risk of Harm to Others:  · The following ratings are based on assessment at the time of the interview  · Demographic Risk Factors include: [age] years of age, under age 35  · Historical Risk Factors include: alcohol abuse  · Recent Specific Risk Factors include: multiple stressors  · Protective Factors: no current homicidal ideation, effective problem solving skills, resilience, safe and stable living environment, supportive family  · Weapons: none  The following steps have been taken to ensure weapons are properly secured: not applicable  · Based on today's assessment, Marcella Hull presents the following risk of harm to others: minimal      The following interventions are recommended: contracts for safety at present - agrees to go to ED if feeling unsafe      Lethality Statement:  Based on today's assessment and clinical criteria, Monika Maciass contracts for safety and is not an imminent risk of harm to self or others  Outpatient level of care is deemed appropriate at this current time  Marcella Hull understands that if they can no longer contract for safety, they need to call the office or report to their nearest Emergency Room for immediate evaluation         Assessment/Plan:     Marcella Hull was personally seen and evaluated today at the [address] [place] [address] [address] outpatient clinic for follow up for insomnia, generalized anxiety disorder, major depressive disorder   Domiciled with parents, brother (18 y/o), mat  grandmother in [place], graduated [employer] [address] [employer] [address] [address] in 5/2021 with a bachelor's in medical microbiology, started a master's program in public health at [address] [place] [address] in fall 2021 semester- withdrew after the semester, currently unemployed, no significant PPH, no past psychiatric hospitalizations, no past suicide attempts, no h/o self-injurious behaviors, no h/o physical altercations, PMH significant for h/o lyme disease, gastritis, h/o esophageal candidiasis, daily medical marijuana use, and history of heavy alcohol use in college, who was personally seen and evaluated today at the [address] [place] [address] E outpatient clinic for follow-up and medication management  Marcella Hull presents reporting that her anxiety and depression remain relatively constant since last visit, when she was started on gabapentin 100 mg 3 times a day  She states this medication is not effective and makes her feel tired, and she often misses dosages because it is difficult to remember taking it 3 times a day  She would like a medication that is once daily  She states she has been applying for jobs after graduating from [employer] and while she gets interviews, 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  This is stressful, and she hopes moving forward things end up working out  Her therapy with better help is affective, but she would like to be put on the [id number] [address] [facility]'s therapy wait list   Her main concern at this visit is insomnia, explaining that she has trouble falling asleep, but even more trouble staying asleep  She wakes up and cannot get back to bed so she stays awake, sometimes with racing thoughts, and believes this has resulted in irritability during the daytime    She states her Cymbalta 60 mg daily and Abilify 5 mg daily are effective and she likes this combination  She has not been taking Ativan  She is interested in initiating a medication for sleep, specifically she is agreeable to trazodone 50 mg daily (will take 25 mg for 1st few days)  While on this med, she will monitor her irritability, anxiety, and depression to see if it improves with increased sleep  She denies SI, HI, AVH, manic episodes, changes in appetite, somatic symptoms, panic attacks  DSM-5 Diagnoses:   1  MDD (major depressive disorder), recurrent episode, moderate ([facility] [address] )    2  Anxiety disorder, unspecified type    3  Insomnia, unspecified type           Treatment Recommendations/Precautions:  · Continue Cymbalta 60 mg daily for anxiety, depression, mood stability  · Continue Abilify 5 mg daily for mood stability, as augmentation to Cymbalta  · Discontinue gabapentin   · Initate trazodone 50 mg q h s  for insomnia, depression, generalized anxiety  · Continue Ativan 1 mg daily for JADEN and panic attack prevention  ? States she has been using 1 pill every 2 week for panic attacks  · Medical   ? Follow up with primary care physician for ongoing medical care   ? Follow up vitamin D level  · Currently with therapist at [facility] help; would like [address]  · Referral for individual psychotherapy  · Follows with family physician for glucose and lipid monitoring due to current therapy with antipsychotic medication  · Aware of 24 hour and weekend coverage for urgent situations accessed by calling [facility] main practice number       Medications Risks/Benefits      Risks, Benefits And Possible Side Effects Of Medications:    Risks, benefits, and possible side effects of medications [facility] and she verbalizes understanding and agreement for treatment      · PARQ for neurontin including depression/suicidality, allergic reactions (SJS, angioedema, rhabdomyolysis, eosinophilia, anaphylaxis), dizziness and somnolence, diarrhea, xerostomia, headaches, drug interactions and others  · PARQ completed including serotonin syndrome, SIADH, worsened depression/suicidality, induction of fco, blood pressure changes and GI distress, weight gain, sexual side effects, insomnia, sedation, potential for drug interactions, and others  · PARQ for neurontin including depression/suicidality, allergic reactions (SJS, angioedema, rhabdomyolysis, eosinophilia, anaphylaxis), dizziness and somnolence, diarrhea, xerostomia, headaches, drug interactions and others  · Discussed with patient the risks of sedation, respiratory depression, impairment in judgment and motor function  Depression, mood changes, GI changes, and others discussed  Patient was also informed of risks of being on or starting opioid medications due to drug interactions and potential for serious respiratory depression and death  Controlled Medication Discussion:     Bonita Jaramillo has been filling controlled prescriptions on time as prescribed according to Kerry Stevens 17  (no refills on file)    Psychotherapy Provided:     Individual psychotherapy provided: Yes  Medications, treatment progress and treatment plan reviewed with Bonita Jaramillo  Supportive therapy provided  Reassurance and supportive therapy provided  Treatment Plan:    Completed and signed during the session: Not applicable - Treatment Plan not due at this session    Note Share Disclaimer:      This note was not shared with the patient due to reasonable likelihood of causing patient harm    Beatrice Saldivar MD 09/22/22

## 2022-10-13 ENCOUNTER — ANNUAL EXAM (OUTPATIENT)
Dept: OBGYN CLINIC | Facility: CLINIC | Age: 22
End: 2022-10-13
Payer: COMMERCIAL

## 2022-10-13 VITALS
HEIGHT: 65 IN | BODY MASS INDEX: 25.66 KG/M2 | SYSTOLIC BLOOD PRESSURE: 122 MMHG | WEIGHT: 154 LBS | DIASTOLIC BLOOD PRESSURE: 76 MMHG

## 2022-10-13 DIAGNOSIS — Z01.419 ENCOUNTER FOR WELL WOMAN EXAM WITH ROUTINE GYNECOLOGICAL EXAM: Primary | ICD-10-CM

## 2022-10-13 DIAGNOSIS — Z30.41 ENCOUNTER FOR SURVEILLANCE OF CONTRACEPTIVE PILLS: ICD-10-CM

## 2022-10-13 PROCEDURE — 99395 PREV VISIT EST AGE 18-39: CPT | Performed by: OBSTETRICS & GYNECOLOGY

## 2022-10-13 RX ORDER — LEVONORGESTREL AND ETHINYL ESTRADIOL 0.15-0.03
1 KIT ORAL DAILY
Qty: 84 TABLET | Refills: 4 | Status: SHIPPED | OUTPATIENT
Start: 2022-10-13

## 2022-10-13 NOTE — PROGRESS NOTES
ASSESSMENT & PLAN:   Diagnoses and all orders for this visit:    Encounter for well woman exam with routine gynecological exam    Encounter for surveillance of contraceptive pills  -     levonorgestrel-ethinyl estradiol (Marlissa) 0 15-30 MG-MCG per tablet; Take 1 tablet by mouth daily    Refills provided for OCPs for the next year  Reviewed recommendation for back up protection with barrier contraception for the next 2-4 weeks  The following were reviewed in today's visit: ASCCP guidelines, Gardisil vaccination, STD testing breast self exam, use and side effects of OCPs, family planning choices, exercise and healthy diet  Patient to return to office in yearly for annual exam      All questions have been answered to her satisfaction  CC:  Annual Gynecologic Examination  Chief Complaint   Patient presents with   • Gynecologic Exam     neg pap 06/29/21  No hx of mammo, dxa or colonoscopy         HPI: Pat Gomez is a 20 [facility] o  [facility] who presents for annual gynecologic examination  She has the following concerns:  None  Needs refills for OCPs  Rosendo Ambrose is starting to go back to school  Interested in stem cell research  Received bachelor's in Microbiology from [employer]  She is starting with online classes for now  She denies having any new partners  She had a difficult time getting refills for her OCPs so she has not been taking them for about 3 weeks  Refills provided today  Health Maintenance:    Exercise: frequently, Peloton bike     Breast exams/breast awareness: yes  Diet: well balanced diet      Past Medical History:   Diagnosis Date   • Anemia    • Anxiety    • Asthma     hx of as child - outgrew    • Depression    • Gastritis    • Gastroparesis    • GERD (gastroesophageal reflux disease)    • Lyme disease        Past Surgical History:   Procedure Laterality Date   • ESOPHAGOGASTRODUODENOSCOPY      EGD with bipsy   • UPPER GASTROINTESTINAL ENDOSCOPY         Past OB/Gyn History:  Period Cycle (Days): 28  Period Duration (Days): 4 to 5 days  Period Pattern: Regular  Menstrual Flow: Moderate  Menstrual Control: Tampon, Maxi pad, Thin pad  Dysmenorrhea: (!) Mild  Dysmenorrhea Symptoms: CrampingPatient's last menstrual period was 09/12/2022 (exact date)  Last Pap: 2021 : no abnormalities  History of abnormal Pap smear: no  HPV vaccine completed: yes    Patient is currently sexually active  STD testing: no  Current contraception: OCP (estrogen/progesterone)      Family History  Family History   Problem Relation Age of Onset   • Anxiety disorder Mother    • Miscarriages / Stillbirths Mother    • Bipolar disorder Father    • Sleep disorder Father    • Anxiety disorder Maternal Grandmother    • Hyperlipidemia Maternal Grandmother    • Hypertension Maternal Grandmother    • Thyroid disease Maternal Grandmother    • Ovarian cancer Maternal Grandmother    • Miscarriages / Stillbirths Maternal Grandmother    • Lung cancer Maternal Grandfather    • Kidney disease Paternal Grandmother    • Prostate cancer Paternal Grandfather    • Skin cancer Paternal Grandfather    • Substance Abuse Neg Hx        Family history of uterine or ovarian cancer: no  Family history of breast cancer: no  Family history of colon cancer: no    Social History:  Social History     Socioeconomic History   • Marital status: Single     Spouse name: Not on file   • Number of children: Not on file   • Years of education: Not on file   • Highest education level: Not on file   Occupational History   • Not on file   Tobacco Use   • Smoking status: Never Smoker   • Smokeless tobacco: Never Used   Vaping Use   • Vaping Use: Never used   Substance and Sexual Activity   • Alcohol use:  Yes     Alcohol/week: 2 0 standard drinks     Types: 2 Standard drinks or equivalent per week     Comment: Socially   • Drug use: Yes     Frequency: 7 0 times per week     Types: Marijuana     Comment: Medical Marijuana   • Sexual activity: Yes Partners: Male     Birth control/protection: OCP   Other Topics Concern   • Not on file   Social History Narrative    Caffeine use    Currently in college    Poor dental hygiene    Tea      Social Determinants of Health     Financial Resource Strain: Not on file   Food Insecurity: Not on file   Transportation Needs: Not on file   Physical Activity: Not on file   Stress: Not on file   Social Connections: Not on file   Intimate Partner Violence: Not on file   Housing Stability: Not on file     Domestic violence screen: negative    Allergies: Allergies   Allergen Reactions   • Pineapple - Food Allergy Anaphylaxis   • Penicillins Hives and Vomiting       Medications:    Current Outpatient Medications:   •  ARIPiprazole (ABILIFY) 5 mg tablet, Take 1 tablet (5 mg total) by mouth daily, Disp: 90 tablet, Rfl: 0  •  DULoxetine (CYMBALTA) 60 mg delayed release capsule, Take 1 capsule (60 mg total) by mouth daily, Disp: 90 capsule, Rfl: 0  •  levonorgestrel-ethinyl estradiol (Marlissa) 0 15-30 MG-MCG per tablet, Take 1 tablet by mouth daily, Disp: 84 tablet, Rfl: 4  •  LORazepam (ATIVAN) 1 mg tablet, Take 1 tablet (1 mg total) by mouth daily as needed for anxiety, Disp: 30 tablet, Rfl: 2  •  ondansetron (Zofran ODT) 4 mg disintegrating tablet, Take 1 tablet (4 mg total) by mouth every 6 (six) hours as needed for nausea or vomiting, Disp: 20 tablet, Rfl: 3  •  traZODone (DESYREL) 50 mg tablet, Take 1 tablet (50 mg total) by mouth daily at bedtime, Disp: 30 tablet, Rfl: 1    Review of Systems:  Review of Systems   Constitutional: Negative for activity change, appetite change and unexpected weight change  Respiratory: Negative for cough and shortness of breath  Cardiovascular: Negative for chest pain  Gastrointestinal: Negative for abdominal pain, constipation, diarrhea, nausea and vomiting     Genitourinary: Negative for difficulty urinating, dyspareunia, frequency, menstrual problem, pelvic pain, urgency, vaginal bleeding, vaginal discharge and vaginal pain  Musculoskeletal: Negative for back pain  Skin: Negative  Neurological: Negative for dizziness, weakness, light-headedness and headaches  Psychiatric/Behavioral: Negative  Physical Exam:  /76   Ht 5' 5" (1 651 m)   Wt 69 9 kg (154 lb)   LMP 09/12/2022 (Exact Date)   Breastfeeding No   BMI 25 63 kg/m²    Physical Exam  Constitutional:       General: She is not in acute distress  Appearance: Normal appearance  She is well-developed and normal weight  She is not diaphoretic  Genitourinary:      Vulva and bladder normal       No lesions in the vagina  Genitourinary Comments: Perineum normal in appearance, no lacerations, no ulcerations, no lesions visualized  Right Labia: No rash, tenderness or lesions  Left Labia: No tenderness, lesions or rash  No inguinal adenopathy present in the right or left side  No vaginal discharge, erythema, tenderness or bleeding  No vaginal prolapse present  No vaginal atrophy present  Right Adnexa: not tender, not full and no mass present  Left Adnexa: not tender, not full and no mass present  Cervix is nulliparous  No cervical motion tenderness, discharge, friability, lesion or polyp  No parametrium nodularity or thickening present  Uterus is not enlarged, tender or prolapsed  No uterine mass detected  Uterus is midaxial       No urethral prolapse or mass present  Bladder is not tender  Pelvic exam was performed with patient in the lithotomy position  Rectum:      No tenderness or external hemorrhoid  Breasts: Breasts are symmetrical       Right: No swelling, bleeding, mass, skin change or tenderness  Left: No swelling, bleeding, mass, skin change or tenderness  HENT:      Head: Normocephalic and atraumatic  Neck:      Thyroid: No thyromegaly or thyroid tenderness     Cardiovascular:      Rate and Rhythm: Normal rate and regular rhythm  Heart sounds: Normal heart sounds  No murmur heard  No friction rub  Pulmonary:      Effort: Pulmonary effort is normal  No respiratory distress  Breath sounds: Normal breath sounds  No wheezing or rales  Abdominal:      Palpations: Abdomen is soft  There is no mass  Tenderness: There is no abdominal tenderness  There is no guarding  Musculoskeletal:         General: No tenderness  Normal range of motion  Right lower leg: No edema  Left lower leg: No edema  Lymphadenopathy:      Lower Body: No right inguinal adenopathy  No left inguinal adenopathy  Neurological:      Mental Status: She is alert and oriented to person, place, and time  Skin:     General: Skin is warm and dry  Coloration: Skin is not pale  Findings: No erythema  Psychiatric:         Mood and Affect: Mood normal          Behavior: Behavior normal          Thought Content: Thought content normal          Judgment: Judgment normal    Vitals and nursing note reviewed

## 2022-11-19 DIAGNOSIS — G47.00 INSOMNIA, UNSPECIFIED TYPE: ICD-10-CM

## 2022-11-19 DIAGNOSIS — F41.9 ANXIETY DISORDER, UNSPECIFIED TYPE: ICD-10-CM

## 2022-11-19 DIAGNOSIS — F33.1 MDD (MAJOR DEPRESSIVE DISORDER), RECURRENT EPISODE, MODERATE (HCC): Chronic | ICD-10-CM

## 2022-11-21 RX ORDER — TRAZODONE HYDROCHLORIDE 50 MG/1
TABLET ORAL
Qty: 30 TABLET | Refills: 1 | Status: SHIPPED | OUTPATIENT
Start: 2022-11-21

## 2022-12-15 DIAGNOSIS — F31.81 BIPOLAR 2 DISORDER (HCC): ICD-10-CM

## 2022-12-15 RX ORDER — DULOXETIN HYDROCHLORIDE 60 MG/1
60 CAPSULE, DELAYED RELEASE ORAL DAILY
Qty: 60 CAPSULE | Refills: 0 | Status: SHIPPED | OUTPATIENT
Start: 2022-12-15 | End: 2022-12-21 | Stop reason: SDUPTHER

## 2022-12-20 ENCOUNTER — TELEPHONE (OUTPATIENT)
Dept: PSYCHIATRY | Facility: CLINIC | Age: 22
End: 2022-12-20

## 2022-12-20 NOTE — TELEPHONE ENCOUNTER
Writer contacted pt in regards to a vm we received  Pt called to get her appt for 12/21/2022 at 8:00 a m  switched to a virtual visit due to pt's grandmother has covid-19  Writer switched appt

## 2022-12-21 ENCOUNTER — TELEMEDICINE (OUTPATIENT)
Dept: PSYCHIATRY | Facility: CLINIC | Age: 22
End: 2022-12-21

## 2022-12-21 ENCOUNTER — TELEPHONE (OUTPATIENT)
Dept: PSYCHIATRY | Facility: CLINIC | Age: 22
End: 2022-12-21

## 2022-12-21 DIAGNOSIS — F33.1 MDD (MAJOR DEPRESSIVE DISORDER), RECURRENT EPISODE, MODERATE (HCC): Chronic | ICD-10-CM

## 2022-12-21 DIAGNOSIS — F41.9 ANXIETY DISORDER, UNSPECIFIED TYPE: ICD-10-CM

## 2022-12-21 DIAGNOSIS — G47.00 INSOMNIA, UNSPECIFIED TYPE: ICD-10-CM

## 2022-12-21 RX ORDER — TRAZODONE HYDROCHLORIDE 100 MG/1
100 TABLET ORAL
Qty: 30 TABLET | Refills: 2 | Status: SHIPPED | OUTPATIENT
Start: 2022-12-21 | End: 2023-01-20

## 2022-12-21 RX ORDER — ARIPIPRAZOLE 5 MG/1
5 TABLET ORAL DAILY
Qty: 90 TABLET | Refills: 1 | Status: SHIPPED | OUTPATIENT
Start: 2022-12-21

## 2022-12-21 RX ORDER — DULOXETIN HYDROCHLORIDE 60 MG/1
60 CAPSULE, DELAYED RELEASE ORAL DAILY
Qty: 60 CAPSULE | Refills: 2 | Status: SHIPPED | OUTPATIENT
Start: 2022-12-21

## 2022-12-21 NOTE — PSYCH
Virtual Visit Disclaimer & Required Documentation  TeleMed provider: Bertrand Carrion MD  and Dr Casper Mcgraw, located at [address] [facility] [address] in [place], [place], [address]  The patient is also located in [place] which is the [facility] in which I hold an active license  The patient was identified by name and date of birth  Rajani Perez was informed that this is a telemedicine visit and that the visit is being conducted through [facility] and patient was informed this is a secure, HIPAA-complaint platform  She agrees to proceed  My office door was closed  No one else was in the room  She acknowledged consent and understanding of privacy and security of the video platform  The patient has agreed to participate and understands they can discontinue the visit at any time  Rajani Perez verbally agrees to participate in [employer]  Pt is aware that [employer] could be limited without vital signs or the ability to perform a full hands-on physical exam  The patient understands she or the provider may request at any time to terminate the video visit and request the patient to seek care or treatment in person  Patient is aware this is a billable service  Psychiatric Follow Up Visit - Behavioral Health   MRN: [id number]    Visit Time  Start: 8:00am  Stop: 9:00am    Total Visit Duration: 60 minutes    History of Present Illness   Rajani Perez is 20 y o  and now presenting to follow up for anxiety, depression, mood instability, Irritability and insomnia  At last appointment, patient was continued on Cymbalta 60 mg daily and Abilify 5 mg daily  Patient was discontinued off of her gabapentin due to heightened drowsiness, ineffective treatment of anxiety, and difficulty remembering to take dosages  She was initiated on trazodone 50 mg nightly for insomnia    She was also continued on Ativan 1 mg daily for JADEN and panic attack prevention, states she takes a pill once every 1-2 weeks  Today, Spenser Stevens states that she rates her anxiety at 2/4 and depression 6/10  All this anxiety and depression stems from stressors at home including verbal arguments with family members, parents are both work from home and she lives in close quarters with a large household of people  Additionally, psychosocial stressors such as the continued unsuccessful job search make it difficult for her earned money and thus move out  She is adjusting her plan and plans to apply for PhD programs moving forward with the plan to move to [place] or [place] she reports on the current medication regimen she is doing well, PHQ-9 score of 9 and JADEN-7 score of 10  She reports she has a main concern of difficulty with sleep, specifically no trouble falling asleep but has difficulty staying asleep  She sleeps from 930 until 1:00 in the morning, wakes up for 2 hours then goes back to sleep  Interviewer discussed sleep hygiene and she is in agreement with working on improving it  She smokes marijuana on occasion which helps with mood and sleep  She is in agreement with increasing her dose of trazodone moving forward to address difficulties with sleep with a plan to continue decreasing marijuana use  Denies SI, HI, AVH, somatic symptoms, PTSD related symptoms, manic episodes, OCD behaviors, or panic attacks over the last month  She takes Ativan 1 time every 2 weeks for anxiety symptoms that began to heighten  She would like to follow up in 2 5 months for further medication management and optimization and would like to be placed on the therapy wait list     Psychiatric Review Of Systems:  • Spenser Stevens reports Symptoms as described in HPI  • Spenser Stevens denies Current suicidal thoughts, plan, or intent, Current thoughts of self-harm or Current homicidal thoughts, plan, or intent      Medical Review Of Systems:  Complete review of systems is negative except as noted above     ------------------------------------  Past Medical History:   Diagnosis Date   • Anemia    • Anxiety    • Asthma     hx of as child - outgrew    • Depression    • Gastritis    • Gastroparesis    • GERD (gastroesophageal reflux disease)    • Lyme disease       Past Surgical History:   Procedure Laterality Date   • ESOPHAGOGASTRODUODENOSCOPY      EGD with bipsy   • UPPER GASTROINTESTINAL ENDOSCOPY         Visit Vitals  OB Status Birth Control   Smoking Status Never      Wt Readings from Last 6 Encounters:   10/13/22 69 9 kg (154 lb)   08/24/22 68 kg (150 lb)   06/17/22 66 5 kg (146 lb 9 6 oz)   03/21/22 67 1 kg (148 lb)   12/15/21 67 1 kg (148 lb)   08/06/21 64 4 kg (142 lb)        Mental Status Exam:  Appearance:  alert, good eye contact, appears stated age, casually dressed and appropriate grooming and hygiene   Behavior:  calm and cooperative   Motor: no abnormal movements   Speech:  spontaneous and coherent   Mood:  "fine, mild anxiety and depression"   Affect:  mood-congruent and constricted   Thought Process:  Organized, logical, goal-directed   Thought Content: no verbalized delusions or overt paranoia   Perceptual disturbances: no reported hallucinations and does not appear to be responding to internal stimuli at this time   Risk Potential: No active or passive suicidal or homicidal ideation was verbalized during interview   Cognition: oriented to self and situation, appears to be of average intelligence and cognition not formally tested   Insight:  Fair   Judgment: Fair       Meds/Allergies    Allergies   Allergen Reactions   • Pineapple - Food Allergy Anaphylaxis   • Penicillins Hives and Vomiting     Current Outpatient Medications   Medication Instructions   • ARIPiprazole (ABILIFY) 5 mg, Oral, Daily   • DULoxetine (CYMBALTA) 60 mg, Oral, Daily   • levonorgestrel-ethinyl estradiol (Marlissa) 0 15-30 MG-MCG per tablet 1 tablet, Oral, Daily   • LORazepam (ATIVAN) 1 mg, Oral, Daily PRN   • ondansetron (ZOFRAN ODT) 4 mg, Oral, Every 6 hours PRN   • traZODone (DESYREL) 50 mg tablet TAKE 1 TABLET BY MOUTH DAILY AT BEDTIME        Labs & Imaging:  I have personally reviewed all pertinent laboratory tests and imaging results  No visits with results within 2 Month(s) from this visit     Latest known visit with results is:   Appointment on 07/19/2022   Component Date Value Ref Range Status   • PTH 07/19/2022 30 2  18 4 - 80 1 pg/mL Final   • WBC 07/19/2022 8 19  4 31 - 10 16 Thousand/uL Final   • RBC 07/19/2022 4 60  3 81 - 5 12 Million/uL Final   • Hemoglobin 07/19/2022 13 3  11 5 - 15 4 g/dL Final   • Hematocrit 07/19/2022 41 9  34 8 - 46 1 % Final   • MCV 07/19/2022 91  82 - 98 fL Final   • MCH 07/19/2022 28 9  26 8 - 34 3 pg Final   • MCHC 07/19/2022 31 7  31 4 - 37 4 g/dL Final   • RDW 07/19/2022 13 0  11 6 - 15 1 % Final   • MPV 07/19/2022 10 1  8 9 - 12 7 fL Final   • Platelets 48/56/0280 286  149 - 390 Thousands/uL Final   • nRBC 07/19/2022 0  /100 WBCs Final   • Neutrophils Relative 07/19/2022 50  43 - 75 % Final   • Immat GRANS % 07/19/2022 0  0 - 2 % Final   • Lymphocytes Relative 07/19/2022 39  14 - 44 % Final   • Monocytes Relative 07/19/2022 7  4 - 12 % Final   • Eosinophils Relative 07/19/2022 3  0 - 6 % Final   • Basophils Relative 07/19/2022 1  0 - 1 % Final   • Neutrophils Absolute 07/19/2022 4 16  1 85 - 7 62 Thousands/µL Final   • Immature Grans Absolute 07/19/2022 0 03  0 00 - 0 20 Thousand/uL Final   • Lymphocytes Absolute 07/19/2022 3 16  0 60 - 4 47 Thousands/µL Final   • Monocytes Absolute 07/19/2022 0 53  0 17 - 1 22 Thousand/µL Final   • Eosinophils Absolute 07/19/2022 0 24  0 00 - 0 61 Thousand/µL Final   • Basophils Absolute 07/19/2022 0 07  0 00 - 0 10 Thousands/µL Final   • Sodium 07/19/2022 140  135 - 147 mmol/L Final   • Potassium 07/19/2022 4 0  3 5 - 5 3 mmol/L Final   • Chloride 07/19/2022 110 (H)  96 - 108 mmol/L Final   • CO2 07/19/2022 25  21 - 32 mmol/L Final   • ANION GAP 07/19/2022 5  4 - 13 mmol/L Final   • BUN 07/19/2022 8 5 - 25 mg/dL Final   • Creatinine 07/19/2022 0 87  0 60 - 1 30 mg/dL Final    Standardized to IDMS reference method   • Glucose, Fasting 07/19/2022 91  65 - 99 mg/dL Final    Specimen collection should occur prior to Sulfasalazine administration due to the potential for falsely depressed results  Specimen collection should occur prior to Sulfapyridine administration due to the potential for falsely elevated results  • Calcium 07/19/2022 8 8  8 3 - 10 1 mg/dL Final   • Corrected Calcium 07/19/2022 9 3  8 3 - 10 1 mg/dL Final   • AST 07/19/2022 15  5 - 45 U/L Final    Specimen collection should occur prior to Sulfasalazine administration due to the potential for falsely depressed results  • ALT 07/19/2022 18  12 - 78 U/L Final    Specimen collection should occur prior to Sulfasalazine and/or Sulfapyridine administration due to the potential for falsely depressed results  • Alkaline Phosphatase 07/19/2022 68  46 - 116 U/L Final   • Total Protein 07/19/2022 7 5  6 4 - 8 4 g/dL Final   • Albumin 07/19/2022 3 4 (L)  3 5 - 5 0 g/dL Final   • Total Bilirubin 07/19/2022 0 26  0 20 - 1 00 mg/dL Final    Use of this assay is not recommended for patients undergoing treatment with eltrombopag due to the potential for falsely elevated results  • eGFR 07/19/2022 94  ml/min/1 73sq m Final   • Hemoglobin A1C 07/19/2022 5 2  Normal 3 8-5 6%; PreDiabetic 5 7-6 4%; Diabetic >=6 5%; Glycemic control for adults with diabetes <7 0% % Final   • EAG 07/19/2022 103  mg/dl Final   • TSH 3RD GENERATON 07/19/2022 2 460  0 450 - 4 500 uIU/mL Final    The recommended reference ranges for TSH during pregnancy are as follows:   First trimester 0 1 to 2 5 uIU/mL   Second trimester  0 2 to 3 0 uIU/mL   Third trimester 0 3 to 3 0 uIU/m    Note: Normal ranges may not apply to patients who are transgender, non-binary, or whose legal sex, sex at birth, and gender identity differ    Adult TSH (3rd generation) reference range follows the recommended guidelines of the American Thyroid Association, January, 2020  • Cholesterol 07/19/2022 173  See Comment mg/dL Final    Cholesterol:         Pediatric <18 Years        Desirable          <170 mg/dL      Borderline High    170-199 mg/dL      High               >=200 mg/dL        Adult >=18 Years            Desirable         <200 mg/dL      Borderline High   200-239 mg/dL      High              >239 mg/dL     • Triglycerides 07/19/2022 63  See Comment mg/dL Final    Triglyceride:     0-9Y            <75mg/dL     10Y-17Y         <90 mg/dL       >=18Y     Normal          <150 mg/dL     Borderline High 150-199 mg/dL     High            200-499 mg/dL        Very High       >499 mg/dL    Specimen collection should occur prior to N-Acetylcysteine or Metamizole administration due to the potential for falsely depressed results  • HDL, Direct 07/19/2022 68  >=50 mg/dL Final    Specimen collection should occur prior to Metamizole administration due to the potential for falsley depressed results  • LDL Calculated 07/19/2022 92  0 - 100 mg/dL Final    LDL Cholesterol:     Optimal           <100 mg/dl     Near Optimal      100-129 mg/dl     Above Optimal       Borderline High 130-159 mg/dl       High            160-189 mg/dl       Very High       >189 mg/dl         This screening LDL is a calculated result  It does not have the accuracy of the Direct Measured LDL in the monitoring of patients with hyperlipidemia and/or statin therapy  Direct Measure LDL (GZP842) must be ordered separately in these patients     • Non-HDL-Chol (CHOL-HDL) 07/19/2022 105  mg/dl Final     ---------------------------------------    Historical Information   Information is copied from the previous visit and updated today as appropriate        Past Psychiatric History:    No significant PPH, no past psychiatric hospitalizations, no past suicide attempts, no h/o self-injurious behaviors, teen superficial cutting 2 months in setting of anxiety, jan 2022 ago cut again due to dropping out of masters program because disliked it, no h/o physical altercations  Currently in outpatient therapy through [facility] virtual therapy on weekly basis      Past Med Trials: Fluoxetine up to 40 mg daily (ineffective), Seroquel XR up to 150 mg qhs (drowsiness), gabapentin (drowsy, hard to remember doses)        Family Psychiatric History:    Father- Bipolar I d/o (on Prozac, Seroquel)  Pat  Great Grandmother- Schizophrenia  Mother- Panic Attacks (Xanax, previously on Zoloft)  Mat  Grandmother- Anxiety  Maternal Side- Chronic abdominal pain     Denies substance abuse or suicidality in immediate relations          Social History:  Domiciled with parents, brother (15 y/o) in [place]  Mother employed in [employer] father employed as pharmaceutical consultant  Reports infrequent caffeine use   No active substance use  Reports that she broke up with boyfriend in 2/2021   No current relationships         Substance Abuse History:  Alcohol- drinking occasionally on weekends, a couple of drinks, about once per week, limited alcohol use  Cannabis- occasional use, about once per month, currently using medical marijuana on daily basis, has medical [employer] card and smokes daily (says its for anxiety and appetite stimulant)        Traumatic History:  Denies any h/o physical or sexual abuse  The following portions of the patient's history were reviewed and updated as appropriate: allergies, current medications, past family history, past medical history, past social history, past surgical history and problem list       Assessment/Plan:   Domiciled with parents, brother (20 y/o), mat  grandmother in [place], graduated [phone] in 5/2021 with a bachelor's in medical microbiology, started a master's program in [facility] [address] [place] [address] in fall 2021 semester- withdrew after the semester, currently unemployed, [facility] anxiety, depression, insomnia (rule out bipolar), no past psychiatric hospitalizations, no past suicide attempts, no h/o self-injurious behaviors, no h/o physical altercations, PMH significant for h/o lyme disease, gastritis, h/o esophageal candidiasis, daily medical marijuana use, and history of heavy alcohol use in college, presenting to the [address] [place] [address] E outpatient clinic [facility] for for follow-up regarding medication management  In the setting of mild anxiety and depression on both subjective and objective measurements, patient is in agreement with taking psychotropic medication regimen as prescribed and increasing her trazodone to 100 mg nightly to address continued difficulty with staying asleep throughout the night  Additionally, she will take melatonin when needed and practice proper sleep hygiene  He denies SI, HI, AVH and would like to follow up in 2 5 months for further medication management and optimization with a plan to sign up for [facility] moving forward        DSM-5 Diagnosis:  1  Major depressive disorder, recurrent episode, mild- not at goal, improving  2  Anxiety disorder, unspecified- not at goal, improving  3  Insomnia, unspecified- not at goal, improving    Treatment Recommendations/Precautions:  • Continue Cymbalta 60 mg daily for anxiety, depression, mood stability  • Continue Abilify 5 mg daily for mood stability, as augmentation to Cymbalta  • Increase trazodone 100 mg q h s  for insomnia, depression, generalized anxiety  • Continue Ativan 1 mg daily for JADEN and panic attack prevention  ?  States she has been using 1 pill every 2 week for panic attacks    • Therapy:  · Referral for individual psychotherapy  • Medical:   · Follow up with primary care physician for ongoing medical care   • Labs:   · Follow up vitamin D level (2nd reminder)  · Most recently obtained 7/19/2022, reviewed  • Medication management every 3 month  • Aware of 24 hour and weekend coverage for urgent situations accessed by calling   [facility] main practice number      Controlled Medication Discussion:   Gela Carbajal has been filling controlled prescriptions on time as prescribed according to [facility]  · Discussed with Gela Carbajal the risks of sedation, respiratory depression, impairment of ability to drive and potential for abuse and addiction related to treatment with benzodiazepine medications  She understands risk of treatment with benzodiazepine medications, agrees to not drive if feels impaired and agrees to take medications as prescribed  · Discussed with [facility] warning on concurrent use of benzodiazepines and opioid medications including sedation, respiratory depression, coma and death  She understands the risk of treatment with benzodiazepines in addition to opioids and wants to continue taking those medications  · Discussed with Gela Carbajal increased risk of impairment related to concurrent use of benzodiazepines and hypnotic medications including excessive sedation, psychomotor impairment and respiratory depression  She understands the risk of treatment with benzodiazepines in addition to hypnotic medications and wants to continue taking those medications    Medical Decision Making / Counseling / Coordination of Care: The following interventions are recommended: return in 3 months for follow up or sooner if needed  Although patient's acute lethality risk is LOW, long-term/chronic lethality risk is mildly elevated given the risk factors listed above  However, at the current moment, Gela Carbajal is future-oriented, forward-thinking, and demonstrates ability to act in a self-preserving manner as evidenced by volitionally seeking psychiatric evaluation and treatment today  To mitigate future risk, patient should adhere to treatment recommendations, avoid alcohol/illicit substance use, utilize community-based resources and familiar support, and prioritize mental health treatment   The importance of medication and treatment compliance was reviewed with the patient  Individual supportive psychotherapy was provided     The diagnosis and treatment plan were reviewed with the patient  Risks, benefits, and alternatives to treatment were discussed:  • PARQ for neurontin including depression/suicidality, allergic reactions (SJS, angioedema, rhabdomyolysis, eosinophilia, anaphylaxis), dizziness and somnolence, diarrhea, xerostomia, headaches, drug interactions and others    • PARQ completed including serotonin syndrome, SIADH, worsened depression/suicidality, induction of fco, blood pressure changes and GI distress, weight gain, sexual side effects, insomnia, sedation, potential for drug interactions, and others    • PARQ for neurontin including depression/suicidality, allergic reactions (SJS, angioedema, rhabdomyolysis, eosinophilia, anaphylaxis), dizziness and somnolence, diarrhea, xerostomia, headaches, drug interactions and others    • Discussed with patient the risks of sedation, respiratory depression, impairment in judgment and motor function  Depression, mood changes, GI changes, and others discussed   Patient was also informed of risks of being on or starting opioid medications due to drug interactions and potential for serious respiratory depression and death        Suicide/Homicide Risk Assessment:     Risk of Harm to Self:  • The following ratings are based on assessment at the time of the interview  • Demographic risk factors include: never , age: young adult (15-24)  • Historical Risk Factors include: chronic psychiatric problems, history of depression, chronic mood disorder, alcohol use, substance use, history of substance use  • Recent Specific Risk Factors include: diagnosis of mood disorder, mental illness diagnosis, current anxiety symptoms  • Protective Factors: no current suicidal ideation, able to manage anger well, effective coping skills, medical compliance, sense of determination, sense of importance of health and wellness, sense of personal control  • Weapons: none  The following steps have been taken to ensure weapons are properly secured: not applicable  • Based on today's assessment, Grace presents the following risk of harm to self: minimal     Risk of Harm to Others:  • The following ratings are based on assessment at the time of the interview  • Demographic Risk Factors include: [age] years of age, under age 35  • Historical Risk Factors include: alcohol abuse  • Recent Specific Risk Factors include: multiple stressors  • Protective Factors: no current homicidal ideation, effective problem solving skills, resilience, safe and stable living environment, supportive family  • Weapons: none  The following steps have been taken to ensure weapons are properly secured: not applicable  • Based on today's assessment, Grace presents the following risk of harm to others: minimal        The following interventions are recommended: contracts for safety at present - agrees to go to ED if feeling unsafe        Lethality Statement:  Based on today's assessment and clinical criteria, Kimo Baxter contracts for safety and is not an imminent risk of harm to self or others  Outpatient level of care is deemed appropriate at this current time  Gloria Marquez understands that if they can no longer contract for safety, they need to call the office or report to their nearest Emergency Room for immediate evaluation       This note was not shared with the patient due to reasonable likelihood of causing patient harm     Santiago Rubio MD

## 2022-12-21 NOTE — PATIENT INSTRUCTIONS
Recommendations regarding insomnia:  Wake-up at the same time every day  Refrain from "napping"  Refrain from going to bed unless you're tired  Utilize your bedroom for sleep only  Avoid use of electronics including television and/or cellphone/computers  Refrain from use of electronics including television and/or cellphones/computers prior to bed  Turn your alarm clock away so the light is not visible  Attempt relaxation using various means like reading if you're restless in bed for approximately 15-20 minutes  Participate in regular physical activities like exercise, although avoid approximately 3-4 hours prior to bed  Morning exercise is ideal   Avoid caffeine use prior to bedtime  Consider tapering down excessive use of caffeine  Avoid tobacco use prior to bedtime  Avoid alcohol use prior to bedtime  Consider reading "No More Sleepless nights" by Basia Álvarez, Ph D   Consider use of online resources including:  http://[employer]/cbt-online-insomnia-treatment html  [employer]  com  CBT-I   Go! To Sleep by the [facility]  Please present for your previously scheduled appointment approximately 15 minutes prior to appointment time  If you are running late or are unable to attend your appointment, please call our [place] office at [phone] or our [place] office at [phone] if applicable to notify the office of your absence  If you are in psychological crisis including not limited to suicidal/homicidal thoughts or thoughts of self-injury, do not hesitate to call/contact your [facility] hotline (see below) or attend to the nearest emergency department    [facility] Crisis: [address] [facility] Crisis: [phone]  [address] Crisis: [address] Crisis: [address] Crisis: [place] [address] Crisis: [address] Crisis: [phone]  National Suicide Prevention Hotline: 2-702.544.2158 Recommendations regarding insomnia:  Wake-up at the same time every day  Refrain from "napping"  Refrain from going to bed unless you're tired  Utilize your bedroom for sleep only  Avoid use of electronics including television and/or cellphone/computers  Refrain from use of electronics including television and/or cellphones/computers prior to bed  Turn your alarm clock away so the light is not visible  Attempt relaxation using various means like reading if you're restless in bed for approximately 15-20 minutes  Participate in regular physical activities like exercise, although avoid approximately 3-4 hours prior to bed  Morning exercise is ideal   Avoid caffeine use prior to bedtime  Consider tapering down excessive use of caffeine  Avoid tobacco use prior to bedtime  Avoid alcohol use prior to bedtime  Consider reading "No More Sleepless nights" by Rodger Lester, Ph D   Consider use of online resources including:  http://[employer]/cbt-online-insomnia-treatment html  [employer]  com  CBT-I   Go! To Sleep by the [facility]

## 2022-12-28 NOTE — TELEPHONE ENCOUNTER
Was calling in regards to therapy wait list and therapy anywhere  Phone was picked up and hung up   Unable to make contact with pt or LVM

## 2023-03-30 ENCOUNTER — OFFICE VISIT (OUTPATIENT)
Dept: PSYCHIATRY | Facility: CLINIC | Age: 23
End: 2023-03-30

## 2023-03-30 DIAGNOSIS — F41.9 ANXIETY DISORDER, UNSPECIFIED TYPE: ICD-10-CM

## 2023-03-30 DIAGNOSIS — F33.1 MDD (MAJOR DEPRESSIVE DISORDER), RECURRENT EPISODE, MODERATE (HCC): Primary | Chronic | ICD-10-CM

## 2023-03-30 DIAGNOSIS — G47.00 INSOMNIA, UNSPECIFIED TYPE: ICD-10-CM

## 2023-03-30 RX ORDER — DULOXETIN HYDROCHLORIDE 30 MG/1
CAPSULE, DELAYED RELEASE ORAL
Qty: 30 CAPSULE | Refills: 2 | Status: SHIPPED | OUTPATIENT
Start: 2023-03-30

## 2023-03-30 RX ORDER — TRAZODONE HYDROCHLORIDE 100 MG/1
100 TABLET ORAL
Qty: 30 TABLET | Refills: 2 | Status: SHIPPED | OUTPATIENT
Start: 2023-03-30 | End: 2023-04-29

## 2023-03-30 RX ORDER — ARIPIPRAZOLE 5 MG/1
5 TABLET ORAL DAILY
Qty: 90 TABLET | Refills: 1 | Status: SHIPPED | OUTPATIENT
Start: 2023-03-30

## 2023-03-30 RX ORDER — DULOXETIN HYDROCHLORIDE 60 MG/1
CAPSULE, DELAYED RELEASE ORAL
Qty: 60 CAPSULE | Refills: 2 | Status: SHIPPED | OUTPATIENT
Start: 2023-03-30

## 2023-03-30 NOTE — PATIENT INSTRUCTIONS
"Please present for your previously scheduled appointment approximately 15 minutes prior to appointment time  If you are running late or are unable to attend your appointment, please call our [facility] office at [phone] or our [place] office at [phone] if applicable to notify the office of your absence  If you are in psychological crisis including not limited to suicidal/homicidal thoughts or thoughts of self-injury, do not hesitate to call/contact your [facility] hotline (see below) or attend to the nearest emergency department  [facility] Crisis: [address] Crisis: [phone]  [address] Crisis: [phone]  [facility] Crisis: [address] Crisis: [place] [address] Crisis: [address] Crisis: [phone]  National Suicide Prevention Hotline: 7-639.356.8460    Please call the office nursing staff for medication issues including refills, problems getting medications, bothersome side effects, etc at [phone]  Please return for a follow up appointment as discussed  If you are running late or are unable to attend your appointment, please call our [facility] office at [phone], or if you were seen in the [place] office, please call [phone]  Recommendations regarding insomnia:  Wake-up at the same time every day  Refrain from \"napping\"  Refrain from going to bed unless you're tired  Utilize your bedroom for sleep only  Avoid use of electronics including television and/or cellphone/computers  Refrain from use of electronics including television and/or cellphones/computers prior to bed  Turn your alarm clock away so the light is not visible  Attempt relaxation using various means like reading if you're restless in bed for approximately 15-20 minutes  Participate in regular physical activities like exercise, although avoid approximately 3-4 hours prior to bed    Morning exercise is " "ideal   Avoid caffeine use prior to bedtime  Consider tapering down excessive use of caffeine  Avoid tobacco use prior to bedtime  Avoid alcohol use prior to bedtime  Consider reading \"No More Sleepless nights\" by Petros Welch, Ph D   Consider use of online resources including:  http://[employer]/cbt-online-insomnia-treatment html  [employer]  [employer]  CBT-I   Go[facility] To Sleep by the [facility]      "

## 2023-03-30 NOTE — PSYCH
Psychiatric Follow Up Visit - Behavioral Health   MRN: [id number]  Visit Time  Start: 1:30pm  Stop: 2:30pm    Total Visit Duration: 60 minutes    History of Present Illness   Naren Aquino is 20 y o  and now presenting to follow up for anxiety, depression, mood instability, Irritability and insomnia  At last appointment, she complained of continued difficulty with sleep and trazodone was increased to 100 mg qHS  Her other medications were continued at the same dose  At this last appointment, her depression and anxiety had been improving  Today, Josesito Aggarwal states that she feels much more depressed and anxious than previous visits due to worsening psychosocial stressors of 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, having almost no friends now that she has left [facility], smoking increasingly more medical marijuana daily up to 1 g daily, and being the  in a household where she lives with mother, father, and grandmother  She rates her depression as 5/10 and anxiety 8/10 and reports rare panic attacks  PHQ-9 score of 13 and JADEN-7 score of 12  She feels like she needs further adjustments in medication  Her sleep is good, appetite is good, and her energy levels are low  Denies PTSD related symptoms and very rarely has a panic attack once a month with sweating, chest pain, shortness of breath lasting 10 minutes  She denies SI, HI, AVH and denies any manic episodes  She states she may have had a manic episode once in her life but she was under the influence of alcohol  Moving forward, she is agreeable with adjusting medications and is interested in PHP, as this has helped her mother get through a period of depression and anxiety  She would like to follow up in 6 weeks and is on the therapy wait list but states she will look into therapy at other resources that were provided to her  Psychiatric Review Of Systems:  • Josesito Aggarwal reports Symptoms as described in HPI    • Josesito Aggarwal denies Current suicidal thoughts, plan, or intent, Current thoughts of self-harm or Current homicidal thoughts, plan, or intent      Medical Review Of Systems:  Complete review of systems is negative except as noted above     ------------------------------------  Past Medical History:   Diagnosis Date   • Anemia    • Anxiety    • Asthma     hx of as child - outgrew    • Depression    • Gastritis    • Gastroparesis    • GERD (gastroesophageal reflux disease)    • Lyme disease       Past Surgical History:   Procedure Laterality Date   • ESOPHAGOGASTRODUODENOSCOPY      EGD with bipsy   • UPPER GASTROINTESTINAL ENDOSCOPY         Visit Vitals  OB Status Birth Control   Smoking Status Never      Wt Readings from Last 6 Encounters:   10/13/22 69 9 kg (154 lb)   08/24/22 68 kg (150 lb)   06/17/22 66 5 kg (146 lb 9 6 oz)   03/21/22 67 1 kg (148 lb)   12/15/21 67 1 kg (148 lb)   08/06/21 64 4 kg (142 lb)        Mental Status Exam:  Appearance:  alert, good eye contact, appears stated age, casually dressed and appropriate grooming and hygiene   Behavior:  calm and cooperative   Motor: no abnormal movements and normal gait and balance   Speech:  spontaneous and coherent   Mood:  depressed and anxious   Affect:  constricted, depressed and anxious   Thought Process:  Organized, logical, goal-directed   Thought Content: no verbalized delusions or overt paranoia   Perceptual disturbances: no reported hallucinations and does not appear to be responding to internal stimuli at this time   Risk Potential: No active or passive suicidal or homicidal ideation was verbalized during interview   Cognition: oriented to self and situation, appears to be of average intelligence and cognition not formally tested   Insight:  Fair   Judgment: Fair       Meds/Allergies    Allergies   Allergen Reactions   • Pineapple - Food Allergy Anaphylaxis   • Penicillins Hives and Vomiting     Current Outpatient Medications   Medication Instructions   • ARIPiprazole (ABILIFY) 5 mg, Oral, Daily • DULoxetine (CYMBALTA) 60 mg, Oral, Daily   • levonorgestrel-ethinyl estradiol (Marlissa) 0 15-30 MG-MCG per tablet 1 tablet, Oral, Daily   • LORazepam (ATIVAN) 1 mg, Oral, Daily PRN   • ondansetron (ZOFRAN ODT) 4 mg, Oral, Every 6 hours PRN   • traZODone (DESYREL) 100 mg, Oral, Daily at bedtime        Labs & Imaging:  I have personally reviewed all pertinent laboratory tests and imaging results  No visits with results within 2 Month(s) from this visit     Latest known visit with results is:   Appointment on 07/19/2022   Component Date Value Ref Range Status   • PTH 07/19/2022 30 2  18 4 - 80 1 pg/mL Final   • WBC 07/19/2022 8 19  4 31 - 10 16 Thousand/uL Final   • RBC 07/19/2022 4 60  3 81 - 5 12 Million/uL Final   • Hemoglobin 07/19/2022 13 3  11 5 - 15 4 g/dL Final   • Hematocrit 07/19/2022 41 9  34 8 - 46 1 % Final   • MCV 07/19/2022 91  82 - 98 fL Final   • MCH 07/19/2022 28 9  26 8 - 34 3 pg Final   • MCHC 07/19/2022 31 7  31 4 - 37 4 g/dL Final   • RDW 07/19/2022 13 0  11 6 - 15 1 % Final   • MPV 07/19/2022 10 1  8 9 - 12 7 fL Final   • Platelets 66/14/8271 286  149 - 390 Thousands/uL Final   • nRBC 07/19/2022 0  /100 WBCs Final   • Neutrophils Relative 07/19/2022 50  43 - 75 % Final   • Immat GRANS % 07/19/2022 0  0 - 2 % Final   • Lymphocytes Relative 07/19/2022 39  14 - 44 % Final   • Monocytes Relative 07/19/2022 7  4 - 12 % Final   • Eosinophils Relative 07/19/2022 3  0 - 6 % Final   • Basophils Relative 07/19/2022 1  0 - 1 % Final   • Neutrophils Absolute 07/19/2022 4 16  1 85 - 7 62 Thousands/µL Final   • Immature Grans Absolute 07/19/2022 0 03  0 00 - 0 20 Thousand/uL Final   • Lymphocytes Absolute 07/19/2022 3 16  0 60 - 4 47 Thousands/µL Final   • Monocytes Absolute 07/19/2022 0 53  0 17 - 1 22 Thousand/µL Final   • Eosinophils Absolute 07/19/2022 0 24  0 00 - 0 61 Thousand/µL Final   • Basophils Absolute 07/19/2022 0 07  0 00 - 0 10 Thousands/µL Final   • Sodium 07/19/2022 140  135 - 147 mmol/L Final   • Potassium 07/19/2022 4 0  3 5 - 5 3 mmol/L Final   • Chloride 07/19/2022 110 (H)  96 - 108 mmol/L Final   • CO2 07/19/2022 25  21 - 32 mmol/L Final   • ANION GAP 07/19/2022 5  4 - 13 mmol/L Final   • BUN 07/19/2022 8  5 - 25 mg/dL Final   • Creatinine 07/19/2022 0 87  0 60 - 1 30 mg/dL Final    Standardized to IDMS reference method   • Glucose, Fasting 07/19/2022 91  65 - 99 mg/dL Final    Specimen collection should occur prior to Sulfasalazine administration due to the potential for falsely depressed results  Specimen collection should occur prior to Sulfapyridine administration due to the potential for falsely elevated results  • Calcium 07/19/2022 8 8  8 3 - 10 1 mg/dL Final   • Corrected Calcium 07/19/2022 9 3  8 3 - 10 1 mg/dL Final   • AST 07/19/2022 15  5 - 45 U/L Final    Specimen collection should occur prior to Sulfasalazine administration due to the potential for falsely depressed results  • ALT 07/19/2022 18  12 - 78 U/L Final    Specimen collection should occur prior to Sulfasalazine and/or Sulfapyridine administration due to the potential for falsely depressed results  • Alkaline Phosphatase 07/19/2022 68  46 - 116 U/L Final   • Total Protein 07/19/2022 7 5  6 4 - 8 4 g/dL Final   • Albumin 07/19/2022 3 4 (L)  3 5 - 5 0 g/dL Final   • Total Bilirubin 07/19/2022 0 26  0 20 - 1 00 mg/dL Final    Use of this assay is not recommended for patients undergoing treatment with eltrombopag due to the potential for falsely elevated results  • eGFR 07/19/2022 94  ml/min/1 73sq m Final   • Hemoglobin A1C 07/19/2022 5 2  Normal 3 8-5 6%; PreDiabetic 5 7-6 4%;  Diabetic >=6 5%; Glycemic control for adults with diabetes <7 0% % Final   • EAG 07/19/2022 103  mg/dl Final   • TSH 3RD GENERATON 07/19/2022 2 460  0 450 - 4 500 uIU/mL Final    The recommended reference ranges for TSH during pregnancy are as follows:   First trimester 0 1 to 2 5 uIU/mL   Second trimester  0 2 to 3 0 uIU/mL   Third trimester 0 3 to 3 0 uIU/m    Note: Normal ranges may not apply to patients who are transgender, non-binary, or whose legal sex, sex at birth, and gender identity differ  Adult TSH (3rd generation) reference range follows the recommended guidelines of the American Thyroid Association, January, 2020  • Cholesterol 07/19/2022 173  See Comment mg/dL Final    Cholesterol:         Pediatric <18 Years        Desirable          <170 mg/dL      Borderline High    170-199 mg/dL      High               >=200 mg/dL        Adult >=18 Years            Desirable         <200 mg/dL      Borderline High   200-239 mg/dL      High              >239 mg/dL     • Triglycerides 07/19/2022 63  See Comment mg/dL Final    Triglyceride:     0-9Y            <75mg/dL     10Y-17Y         <90 mg/dL       >=18Y     Normal          <150 mg/dL     Borderline High 150-199 mg/dL     High            200-499 mg/dL        Very High       >499 mg/dL    Specimen collection should occur prior to N-Acetylcysteine or Metamizole administration due to the potential for falsely depressed results  • HDL, Direct 07/19/2022 68  >=50 mg/dL Final    Specimen collection should occur prior to Metamizole administration due to the potential for falsley depressed results  • LDL Calculated 07/19/2022 92  0 - 100 mg/dL Final    LDL Cholesterol:     Optimal           <100 mg/dl     Near Optimal      100-129 mg/dl     Above Optimal       Borderline High 130-159 mg/dl       High            160-189 mg/dl       Very High       >189 mg/dl         This screening LDL is a calculated result  It does not have the accuracy of the Direct Measured LDL in the monitoring of patients with hyperlipidemia and/or statin therapy  Direct Measure LDL (VAU075) must be ordered separately in these patients     • Non-HDL-Chol (CHOL-HDL) 07/19/2022 105  mg/dl Final     ---------------------------------------    Historical Information   Information is copied from the previous visit and updated today as appropriate  Past Psychiatric History:    No significant PPH, no past psychiatric hospitalizations, no past suicide attempts, no h/o self-injurious behaviors, teen superficial cutting 2 months in setting of anxiety, jan 2022 ago cut again due to dropping out of masters program because disliked it, no h/o physical altercations  Currently in outpatient therapy through [facility] virtual therapy on weekly basis      Past Med Trials: Fluoxetine up to 40 mg daily (ineffective after some time), Seroquel XR up to 150 mg qhs (drowsiness), gabapentin (drowsy, hard to remember doses), zoloft (ineffective after some time)        Family Psychiatric History:    Father- Bipolar I d/o (on Prozac, Seroquel)  Pat  Great Grandmother- Schizophrenia  Mother- Panic Attacks (Xanax, previously on Zoloft)  Mat  Grandmother- Anxiety  Maternal Side- Chronic abdominal pain     Denies substance abuse or suicidality in immediate relations          Social History:  Domiciled with parents, brother (15 y/o) in [place]  Mother employed in [employer], father employed as pharmaceutical consultant  Reports infrequent caffeine use   No active substance use  Reports that she broke up with boyfriend in 2/2021   No current relationships         Substance Abuse History:  Alcohol- drinking occasionally on weekends, a couple of drinks, about once per week, limited alcohol use  Cannabis- occasional use, about once per month, currently using medical marijuana on daily basis, has medical [employer] card and smokes daily (says its for anxiety and appetite stimulant)        Traumatic History:  Denies any h/o physical or sexual abuse  The following portions of the patient's history were reviewed and updated as appropriate: allergies, current medications, past family history, past medical history, past social history, past surgical history and problem list       Assessment/Plan:   Ronny Lobo is a 20 y o , female, possessing a previous psychiatric history significant for  anxiety, depression, insomnia (rule out bipolar), no past psychiatric hospitalizations, no past suicide attempts, no h/o self-injurious behaviors, no h/o physical altercations, PMH significant for h/o lyme disease, gastritis, h/o esophageal candidiasis, daily medical marijuana use, and history of heavy alcohol use in college, presenting to the [address] [place] [address] E outpatient clinic [facility] for for follow-up regarding medication management  In the setting of continued anxiety and depression on both subjective and objective measurements along with patient's reporting that she feels Cymbalta is not fully effective, she is agreeable with increasing Cymbalta to 60 mg in the morning and 30 mg at night for a total of 90 mg daily  Other psychiatric medications can remain the same, as she reports good sleep and mood stability  She uses Ativan infrequently and can be continued on this medication  She would like to sign up with partial hospitalization program moving forward as she feels her depression is worsening and she is in need of a individual therapy in the setting of worsening psychosocial stressors, hopelessness, isolation, and periods of tearfulness  She understands that a referral has been made at today's appointment and she hopes to have virtual appointments moving forward  She would like to follow up in 6 weeks for further medication management and optimization and continues to wait on the therapy wait list, but states she will look into other therapy options that have been provided to her moving forward  DSM-5 Diagnosis:   1  Major depressive disorder, recurrent episode, mild- not at goal  2  Anxiety disorder, unspecified- not at goal  3   Insomnia, unspecified- at goal    Treatment Recommendations/Precautions:  · Increase Cymbalta 60 mg qAM and 30 mg qHS for anxiety, depression, mood stability  • Continue Abilify 5 mg daily for mood stability, as augmentation for depression  • Continue trazodone 100 mg q h s  for insomnia, depression, generalized anxiety  • Continue Ativan 1 mg daily for JADEN and panic attack prevention  • States she has been using 1 pill every 2 week for panic attack prevention  • Referral made for Partial Hospitalization Program to address depression, anxiety, and need for therapy along with not being working so has time available       • Therapy:  • Referral for individual psychotherapy; pending  Patient will look into  • Medical:   ? Follow up with primary care physician for ongoing medical care   • Labs:   ? Follow up vitamin D level (3rd reminder)  ? Most recently obtained 7/19/2022, reviewed  • Medication management every 1 5 month  • Aware of 24 hour and weekend coverage for urgent situations accessed by calling [facility] main practice number      Controlled Medication Discussion:   Manuel Negro has been filling controlled prescriptions on time as prescribed according to Kerry Stevens 17  · Discussed with Manuel Negro the risks of sedation, respiratory depression, impairment of ability to drive and potential for abuse and addiction related to treatment with benzodiazepine medications  She understands risk of treatment with benzodiazepine medications, agrees to not drive if feels impaired and agrees to take medications as prescribed  · Discussed with [facility] warning on concurrent use of benzodiazepines and opioid medications including sedation, respiratory depression, coma and death  She understands the risk of treatment with benzodiazepines in addition to opioids and wants to continue taking those medications  · Discussed with Manuel Negro increased risk of impairment related to concurrent use of benzodiazepines and hypnotic medications including excessive sedation, psychomotor impairment and respiratory depression   She understands the risk of treatment with benzodiazepines in addition to hypnotic medications and wants to continue taking those medications  · Discussed with Parris Faith need to slowly taper off benzodiazepines as recommended by [place] [facility], due to concurrent use of opioid medications and increased risk of sedation, respiratory depression, coma and death with that combination    Medical Decision Making / Counseling / Coordination of Care: The following interventions are recommended: return in 6 weeks for follow up  Although patient's acute lethality risk is LOW, long-term/chronic lethality risk is mildly elevated given the risk factors listed above  However, at the current moment, Parris Faith is future-oriented, forward-thinking, and demonstrates ability to act in a self-preserving manner as evidenced by volitionally seeking psychiatric evaluation and treatment today  To mitigate future risk, patient should adhere to treatment recommendations, avoid alcohol/illicit substance use, utilize community-based resources and familiar support, and prioritize mental health treatment  The importance of medication and treatment compliance was reviewed with the patient  Individual supportive psychotherapy was provided     The diagnosis and treatment plan were reviewed with the patient   Risks, benefits, and alternatives to treatment were discussed:  • PARQ for neurontin including depression/suicidality, allergic reactions (SJS, angioedema, rhabdomyolysis, eosinophilia, anaphylaxis), dizziness and somnolence, diarrhea, xerostomia, headaches, drug interactions and others    • PARQ completed including serotonin syndrome, SIADH, worsened depression/suicidality, induction of fco, blood pressure changes and GI distress, weight gain, sexual side effects, insomnia, sedation, potential for drug interactions, and others    • PARQ for neurontin including depression/suicidality, allergic reactions (SJS, angioedema, rhabdomyolysis, eosinophilia, anaphylaxis), dizziness and somnolence, diarrhea, xerostomia, headaches, drug interactions and others    • Discussed with patient the risks of sedation, respiratory depression, impairment in judgment and motor function  Depression, mood changes, GI changes, and others discussed  Patient was also informed of risks of being on or starting opioid medications due to drug interactions and potential for serious respiratory depression and death        Suicide/Homicide Risk Assessment:     Risk of Harm to Self:  • The following ratings are based on assessment at the time of the interview  • Demographic risk factors include: never , age: young adult (15-24)  • Historical Risk Factors include: chronic psychiatric problems, history of depression, chronic mood disorder, alcohol use, substance use, history of substance use  • Recent Specific Risk Factors include: diagnosis of mood disorder, mental illness diagnosis, current anxiety symptoms  • Protective Factors: no current suicidal ideation, able to manage anger well, effective coping skills, medical compliance, sense of determination, sense of importance of health and wellness, sense of personal control  • Weapons: none  The following steps have been taken to ensure weapons are properly secured: not applicable  • Based on today's assessment, Grace presents the following risk of harm to self: minimal     Risk of Harm to Others:  • The following ratings are based on assessment at the time of the interview  • Demographic Risk Factors include: [age] years of age, under age 35  • Historical Risk Factors include: alcohol abuse  • Recent Specific Risk Factors include: multiple stressors  • Protective Factors: no current homicidal ideation, effective problem solving skills, resilience, safe and stable living environment, supportive family  • Weapons: none   The following steps have been taken to ensure weapons are properly secured: not applicable  • Based on today's assessment, Grace presents the following risk of harm to others: minimal    This note was not shared with the patient due to reasonable likelihood of causing patient harm     Governor MD Osmany

## 2023-04-03 ENCOUNTER — TELEMEDICINE (OUTPATIENT)
Dept: PSYCHIATRY | Facility: CLINIC | Age: 23
End: 2023-04-03

## 2023-04-03 ENCOUNTER — OFFICE VISIT (OUTPATIENT)
Dept: PSYCHOLOGY | Facility: CLINIC | Age: 23
End: 2023-04-03

## 2023-04-03 DIAGNOSIS — F41.1 GENERALIZED ANXIETY DISORDER: Primary | ICD-10-CM

## 2023-04-03 DIAGNOSIS — F41.1 GAD (GENERALIZED ANXIETY DISORDER): Primary | ICD-10-CM

## 2023-04-03 DIAGNOSIS — F33.2 SEVERE EPISODE OF RECURRENT MAJOR DEPRESSIVE DISORDER, WITHOUT PSYCHOTIC FEATURES (HCC): ICD-10-CM

## 2023-04-03 NOTE — BH TREATMENT PLAN
"    Assessment/Plan:      Diagnoses and all orders for this visit:    JADEN (generalized anxiety disorder)    Severe episode of recurrent major depressive disorder, without psychotic features ([facility] [address] )          Subjective:     Patient ID: Hanley Boast is a 20 y o  female  [facility] Treatment Plan   AREAS OF NEED: Depression and anxiety as evidenced by difficulty leaving the home, poor concentration, shortness of breath, stomach upset, racing mind, racing heart, flushed, sweatiness, jitteriness, lack of motivation, avoidance, and hypersomnia due to relationship, financial, vocational stressors  Date Initiated: 04/03/23    Strengths: \"baking, embroidery, raising plants\"     LONG TERM GOAL:   Date Initiated: 04/03/23  1 0 I will identify and share two ways I feel relief of anxiety symptoms and improvement in ability to manage daily stressors  Target Date: 05/05/2023  Completion Date:       SHORT TERM OBJECTIVES:     Date Initiated: 04/03/23  1 1 I will learn and practice daily a new thought stopping technique to improve my day-to-day functioning  Revision Date:   Target Date: 04/12/2023  Completion Date:     Date Initiated: 04/03/23  1 2 I will learn two new self-motivation skills and share the challenges or successes daily  Revision Date:   Target Date: 04/12/2023  Completion Date:    Date Initiated: 04/03/23  1 3 I will take medications as prescribed and share questions and concerns if arise  Revision Date:  Target Date: 04/12/2023  Completion Date:     Date Initiated: 04/03/23  1 4 I will identify 3 ways my supports can assist in my recovery and agree to staff/support contact as indicated      Revision Date:  Target Date: 04/12/2023  Completion Date:          7 DAY REVISION:    Date Initiated:  Revision Date:   Target Date:   Completion Date:      PSYCHIATRY:  Date Initiated:  04/03/23  Medication Management and Education       Revision Date:       The person(s) responsible for carrying out the plan is Ladan Severino " DO Luca; Darlene Ortiz PA-C    NURSING/SYMPTOM EDUCATION:   Date Initiated: 04/03/23  1 1, 1 2  1 3, 1 4 This RN/Or Therapist will provide wellness/symptoms and skill education groups three to five days weekly to educate Santiago Wills on signs and symptoms of diagnoses, skills to manage, and medication questions that will be addressed by the treatment team     Revision date: The person(s) responsible for carrying out the plan is Tyron Ye RN, BSN    PSYCHOLOGY:   Date Initiated: 04/03/23       1 1, 1 2, 1 4 Provide psychotherapy group 5 times per week to allow opportunity for Santiago Wills  to explore stressors and ways of coping  Revision Date:   The person(s) responsible for carrying out the plan is JAMI Meek,TR; [facility]    ALLIED THERAPY:   Date Initiated: 04/03/23  1 1,1 2 Engage Santiago Wills in AT group 5 times weekly to encourage development and use of wellness tools to decrease symptoms and promote recovery through meaningful activity  Revision Date:       The person(s) responsible for carrying out the plan is Mickiel Cabot , MT-BC; Matt MEDINA     CASE MANAGEMENT:   Date Initiated: 04/03/23      1 0 This  will meet with Santiago Wills  3-4 times weekly to assess treatment progress, discharge planning, connection to community supports and UR as indicated  Revision Date:   The person(s) responsible for carrying out the plan is Mayra BASS RN    TREATMENT REVIEW/COMMENTS:     DISCHARGE CRITERIA: Identify 3 signs of progress and complete relapse prevention plan  DISCHARGE PLAN: Connect with identified outpatient providers  Estimated Length of Stay: 10 treatment days             Diagnosis and Treatment Plan explained to Odalisnaomy Masonix relates understanding diagnosis and is agreeable to Treatment Plan  CLIENT COMMENTS / Please share your thoughts, feelings, need and/or experiences regarding your treatment plan with Staff    Please see follow up note with comments  Signatures can be found on [facility] Treatment plan consent form
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
## 2023-04-03 NOTE — PSYCH
"Subjective:     Patient ID: Hubert Morales is a 20 y o  female  [facility] Clinical Progress Notes      Specialized Services Documentation  Therapist must complete separate progress note for each specific clinical activity in which the individual participated during the day  Allied Therapy   Group was facilitated virtually in a private office using HIPAA compliant and approved Microsoft Teams  Hubert Morales  consented to the use of tele-video modality of treatment and was virtually present for group allied therapy  [id number] Hubert Morales was excused from [facility] group focused on universal human needs in order to attend intake evaluation  Group was observed to be engaged in therapist led discussion on what the different categories of universal human needs are  Group engaged in moris analyses to \"Human\", \"Imagine\" and \"This is me\" to correspond with the different categories (well  being, connection, and self-expression)  Group listed a need in a song writing activity to \"Everybody\"  Unable to not effort noted toward treatment goal  Continue AT to encourage development and practice of human needs  Tx Plan Objective: n/a, Therapist:  CAROL Martell    Education Therapy   9813-3802 Hubert Morales did not attend due to attending intake evaluation  [id number] Hubert Morales engaged throughout the treatment day  Was engaged in learning related to Illness, Medication, Aftercare and Wellness Tools  Staff utilized Verbal, Written, A/V and Demonstration teaching methods  Hubert Morales shared area of learning and set a goal for outside of program to go for a walk        Tx Plan Objective: 1 1,1 2,1 4, Therapist:  CAROL Martell    "

## 2023-04-03 NOTE — PSYCH
"Subjective:    Patient ID: Everett Libman is a 20 y o  female      [facility] Clinical Progress Notes       Specialized Services Documentation  Therapist must complete separate progress note for each specific clinical activity in which the individual participated during the day  Group Psychotherapy  Everett Libman consented to the use of tele-video modality of treatment  [id number] This group was facilitated virtually in a private office using HIPAA Compliant and Approved Microsoft Teams  Everett Libman participated participated actively in a psychotherapy group focused on emotional awareness  Two virtual interactive spinners were presented to the group via screen-sharing; one spinner was various emotions and the other contained emotional reflections questions  Participants took turns, one at a time, where facilitator simultaneously spun the wheels which each presented one result  The participant taking their turn responded to the reflection question using their emotion result in combination  Emotional reflection cards had prompts such as: \"Tell us about the last time you felt this emotion  What did you learn from it? \" \"How do you cope with this emotion? If it isn't healthy, how could you adjust?\" \"How / where does this emotion show up in your body? \" \"How is your relationship with this emotion? Do you want it to change? \" and other similar prompts as well  Open discussion occurred and further prompts were posed to the group to build on the topics at hand  Everett Libman shared about how she reminds herself of her accomplishments when she is experiencing disappointment  Continue to note progress towards goals  Continue with psychotherapy to further expand emotional awareness     Tx Plan Objective: 1 1, 1 2, 1 4  Therapist: BELLE Edge     "

## 2023-04-03 NOTE — PSYCH
Subjective:     Patient ID: Melinda Hatch is a 20 y o  female  [facility] Clinical Progress Notes      Specialized Services Documentation  Therapist must complete separate progress note for each specific clinical activity in which the individual participated during the day  Subjective:     Patient ID: Melinda Hatch is a 20 y o  female  [facility] Clinical Progress Notes      Specialized Services Documentation  Therapist must complete separate progress note for each specific clinical activity in which the individual participated during the day  Case Management Note    Niranjan BASS RN    Current suicide risk : Low        [id number] Met with Melinda Hatch via TEAMS  Reviewed program, initial paperwork reviewed: Consent for Treatment, PHP handbook, HIPPA, General Consent, Client Bill of Rights, and Smoking/Drug and Alcohol Policy  Release of Information obtained for emergency contact - Milan Dow (mother) at [phone] and PCP and Health Care Coordination Form  Melinda Hatch has hard copies of all paperwork and verbally gave consent  Reviewed and given on call number  PCP notified of admission and health care coordination form sent  Completed initial psycho-social evaluation and initial treatment goals discussed  Jessica Obregondeclan physically unable to provide a signature; however, I understand the nature of and am in agreement with the documentation presented to me via TEAMS  I have received a copy through My Chart and/or the [address] [place] [address],[address] Postal Service  I freely give verbal consent  Name of Document (s)- Consent for Treatment, PHP handbook, HIPPA, General Consent, Client Bill of Rights, and Smoking/Drug and Alcohol Policy  Release of Information obtained for emergency contact, and PCP and Health Care Coordination Form: Witness to verbal consent: Isabela Gonzalez  Witness to verbal consent: Prudence Sciara      Medications changes/added/denied?   - See Dr Linda Willingham note    Treatment session number: Assessment only    Individual Case Management Visit provided today?  yes    [facility] follow up physician's orders: Admit to PHP - See Dr Abdiaziz Milian note

## 2023-04-03 NOTE — PSYCH
Virtual Regular Visit    Verification of patient location:    Patient is located in the following state in which I hold an active license [place]      Assessment/Plan:    Problem List Items Addressed This Visit        Other    Severe episode of recurrent major depressive disorder, without psychotic features ([facility] [address] )    JADEN (generalized anxiety disorder) - Primary       Goals addressed in session:           Reason for visit is PHP VIRTUAL GROUP DUE TO COVID-19      Encounter provider [facility] PARTIAL [address]    Provider located at [address] [place] [address] Dr  [address] [place] [address]      Recent Visits  No visits were found meeting these conditions  Showing recent visits within past 7 days and meeting all other requirements  Today's Visits  Date Type Provider Dept   04/03/23 Office Visit [facility] PARTIAL PSYCH GROUP THERAPY [facility]   Showing today's visits and meeting all other requirements  Future Appointments  No visits were found meeting these conditions  Showing future appointments within next 150 days and meeting all other requirements       The patient was identified by name and date of birth  Nikia Baca was informed that this is a telemedicine visit and that the visit is being conducted [employer]  She agrees to proceed     My office door was closed  No one else was in the room  She acknowledged consent and understanding of privacy and security of the video platform  The patient has agreed to participate and understands they can discontinue the visit at any time  Patient is aware this is a billable service  Subjective  Nikia Baca is a 20 y o  female         HPI     Past Medical History:   Diagnosis Date   • Anemia    • Anxiety    • Asthma     hx of as child - outgrew    • Depression    • Gastritis    • Gastroparesis    • GERD (gastroesophageal reflux disease)    • Lyme disease        Past Surgical History:   Procedure Laterality Date   • ESOPHAGOGASTRODUODENOSCOPY      EGD with bipsy   • UPPER GASTROINTESTINAL ENDOSCOPY         Current Outpatient Medications   Medication Sig Dispense Refill   • ARIPiprazole (ABILIFY) 5 mg tablet Take 1 tablet (5 mg total) by mouth daily 90 tablet 1   • DULoxetine (Cymbalta) 30 mg delayed release capsule Take cymbalta 60 mg in the morning (one 60 mg tablet) and 30 mg at night (one 30 mg tablet) for a total of 90 mg daily 30 capsule 2   • DULoxetine (CYMBALTA) 60 mg delayed release capsule Take cymbalta 60 mg in the morning (one 60 mg tablet) and 30 mg at night (one 30 mg tablet) for a total of 90 mg daily 60 capsule 2   • levonorgestrel-ethinyl estradiol (Marlissa) 0 15-30 MG-MCG per tablet Take 1 tablet by mouth daily 84 tablet 4   • LORazepam (ATIVAN) 1 mg tablet Take 1 tablet (1 mg total) by mouth daily as needed for anxiety 30 tablet 2   • ondansetron (Zofran ODT) 4 mg disintegrating tablet Take 1 tablet (4 mg total) by mouth every 6 (six) hours as needed for nausea or vomiting 20 tablet 3   • traZODone (DESYREL) 100 mg tablet Take 1 tablet (100 mg total) by mouth daily at bedtime 30 tablet 2     No current facility-administered medications for this visit  Allergies   Allergen Reactions   • Pineapple - Food Allergy Anaphylaxis   • Penicillins Hives and Vomiting       Review of Systems    Video Exam    There were no vitals filed for this visit  Physical Exam     I spent FOUR GROUP HOURS PLUS CASE MANAGEMENT minutes with patient today in which greater than 50% of the time was spent in counseling/coordination of care regarding PHP - SEE NOTES

## 2023-04-04 ENCOUNTER — OFFICE VISIT (OUTPATIENT)
Dept: PSYCHOLOGY | Facility: CLINIC | Age: 23
End: 2023-04-04

## 2023-04-04 DIAGNOSIS — F33.2 SEVERE EPISODE OF RECURRENT MAJOR DEPRESSIVE DISORDER, WITHOUT PSYCHOTIC FEATURES (HCC): ICD-10-CM

## 2023-04-04 DIAGNOSIS — F41.1 GAD (GENERALIZED ANXIETY DISORDER): Primary | ICD-10-CM

## 2023-04-04 NOTE — PSYCH
Subjective:     Patient ID: Everett Libman is a 20 y o  female  [facility] Clinical Progress Notes      Specialized Services Documentation  Therapist must complete separate progress note for each specific clinical activity in which the individual participated during the day  Group Psychotherapy  This group was facilitated virtually in a private office using HIPAA Compliant and Approved [employer] Teams  Everett Libman consented to the use of tele-video modality of treatment  ([id number]) Everett Libman actively engaged in psychoeducational group about medication management, types of antidepressants/side effects, and medication tips  Group came up with strategies that helped them remember to take their medications and developed ideas to make it easier in the future  The group talked about understanding the purpose, dose, type, timing and side effects of their medications  Teaching on emergency situations and who to go to for help was brought up as well  Group was encouraged to ask questions in an open forum at the end of group  Good progress displayed through engagement in topic  Treatment Plan Objective 1 1, 1 2, 1 3, 1 4 Therapist: Tawnya BASS RN    Co-led: Sonja [facility]       Education Therapy   [id number] Everett Libman actively shared in morning assessment and goal review  Presented as Receptive related to readiness to learn  Everett Libman did complete goal from last treatment day identifying gaining clarity and relaxation  did not present with any barriers to learning  [id number] Everett Libman engaged throughout the treatment day  Was engaged in learning related to Illness, Medication, Aftercare, and Wellness Tools  Staff utilized Verbal, Written, A/V, and Demonstration teaching methods  Everett Libman shared area of learning and set a goal for outside of program to research medications she is taking        Tx Plan Objective: 1 1,1 2,1 4 Therapist:  Tawnya BASS RN Other: Utilization Review: [id number] Nneka  care advocate at [facility] left voicemail regarding authorization for Paty  7 treatment days good 04/03/2023 through 04/11/2023 Reviewer will be Velton Drop and you can reach Carmen Lundberg at our toll free number [phone] EXT [phone]  Authorization number is [id number]    Case Management Note  This case management session was facilitated virtually in a private office using HIPAA Compliant and Approved [employer] Teams  Paty consented to the use of tele-video modality of treatment  Antione BASS RN    Current suicide risk : Low     3855-1816 Met with Paty  Reported she is benefiting from groups  Progress noted in that today Krystyna Irwin participated actively  Barriers continue to be job and relationship stressors  Reviewed treatment plan and UR outcomes  Diana Lopez physically unable to provide a signature; however, I understand the nature of and am in agreement with the documentation presented to me via TEAMS  I have received a copy through My Chart and/or the [address] [facility] [employer]  I freely give verbal consent  Name of Document- treatment plan: Witness to verbal consent: Antione Perez RN  Witness to verbal consent: Ashley Juan     Medications changes/added/denied? No    Treatment session number: 2    Individual Case Management Visit provided today?  Yes     [facility]ations follow up physician's orders: No new orders

## 2023-04-04 NOTE — PSYCH
Visit Time    Visit Start Time: 0930  Visit Stop Time: 3918  Total Visit Duration: 60 minutes    Subjective:     Patient ID: Gwen An is a 20 y o  female  [facility] Clinical Progress Notes      Specialized Services Documentation  Therapist must complete separate progress note for each specific clinical activity in which the individual participated during the day  Group Psychotherapy - Problem Solving  This group was facilitated virtually in a private office using HIPAA compliant and approved [employer] Teams  Gwne An attended group on Problem Solving  Today members focused on learning how to understand and integrate effective problems solving techniques to resolve any problems they have encountered or will encounter  The goal of today's group was for members to create a sense of confidence to approach problems and integrate techniques to find/create effective solutions  Members practiced specific techniques and openly discussed:  • Example problem scenarios  • Explore positive self-statements/affirmations  • Sharing personal problems and using designated techniques as a group to find alternative solutions  • ADAPT model  • Supplemental material/techniques  Group members shared their personal struggle with problem solving relating to each of the mentioned domains and discussed methods to integrate specific solutions to problems in their life  This writer encouraged the group members to partake in group discussion and utilize supplemental materials inside and outside of the program  Gwen An made minimal efforts towards progress goals which were displayed through minimal participation and engagement in topic  Balwinder Bernal had her camera off the entire time and did not participate in the discussion     Tx Plan Objective: 1 1,1 2,1 4  BELLE Morrell

## 2023-04-04 NOTE — PSYCH
"Subjective:     Patient ID: Juli Ferguson is a 20 y o  female  [facility] Clinical Progress Notes      Specialized Services Documentation  Therapist must complete separate progress note for each specific clinical activity in which the individual participated during the day  Allied Therapy   Group was facilitated virtually in a private office using HIPAA compliant and approved [facility] Teams  Juli Ferguson consented to the use of tele-video modality of treatment and was virtually present for group allied therapy  [id number] Juli Ferguson  actively shared in [facility] group focused on gratitude  Epi Lloyd was observed to be engaged in therapist led discussion on turning \"I'm sorry\" into \"thank you\" as well as a pass the card activity where group members wrote a gratitude for the person next to them  Epi Lloyd practiced writing their own gratitude statements and listed \"I'm grateful that I'm academically ambitious\" as one of them  Group engaged in a moris discussion on \"This\" by [employer]  Some effort noted toward treatment goal  Continue AT to encourage development and practice of gratitude     Tx Plan Objective: 1 1,1 2,1 4, Therapist:  Dianne Fernandez, MT-BC    "

## 2023-04-04 NOTE — PSYCH
Virtual Regular Visit    Verification of patient location:    Patient is located in the following state in which I hold an active license [place]      Assessment/Plan:    Problem List Items Addressed This Visit        Other    Severe episode of recurrent major depressive disorder, without psychotic features ([address] )    JADEN (generalized anxiety disorder) - Primary       Goals addressed in session: Goal 1          Reason for visit is VIRTUAL PHP GROUP DUE TO COVID-19  Encounter provider [address] PARTIAL PSYCH PROGRAM    Provider located at [address] [place] [address]   [id number] [facility] [id number]      Recent Visits  Date Type Provider Dept   04/03/23 Office Visit [address] PARTIAL PSYCH GROUP THERAPY [facility]   Showing recent visits within past 7 days and meeting all other requirements  Today's Visits  Date Type Provider Dept   04/04/23 Office Visit [address] PARTIAL PSYCH GROUP THERAPY [facility]   Showing today's visits and meeting all other requirements  Future Appointments  No visits were found meeting these conditions  Showing future appointments within next 150 days and meeting all other requirements       The patient was identified by name and date of birth  Adalid Guillaume was informed that this is a telemedicine visit and that the visit is being conducted [employer]  She agrees to proceed     My office door was closed  No one else was in the room  She acknowledged consent and understanding of privacy and security of the video platform  The patient has agreed to participate and understands they can discontinue the visit at any time  Patient is aware this is a billable service  Subjective  Adalid Guillaume is a 20 y o  female         HPI     Past Medical History:   Diagnosis Date   • Anemia    • Anxiety    • Asthma     hx of as child - outgrew    • Depression    • Gastritis    • Gastroparesis    • GERD (gastroesophageal reflux disease)    • Lyme disease        Past Surgical History:   Procedure Laterality Date   • ESOPHAGOGASTRODUODENOSCOPY      EGD with bipsy   • UPPER GASTROINTESTINAL ENDOSCOPY         Current Outpatient Medications   Medication Sig Dispense Refill   • ARIPiprazole (ABILIFY) 5 mg tablet Take 1 tablet (5 mg total) by mouth daily 90 tablet 1   • DULoxetine (Cymbalta) 30 mg delayed release capsule Take cymbalta 60 mg in the morning (one 60 mg tablet) and 30 mg at night (one 30 mg tablet) for a total of 90 mg daily 30 capsule 2   • DULoxetine (CYMBALTA) 60 mg delayed release capsule Take cymbalta 60 mg in the morning (one 60 mg tablet) and 30 mg at night (one 30 mg tablet) for a total of 90 mg daily 60 capsule 2   • levonorgestrel-ethinyl estradiol (Marlissa) 0 15-30 MG-MCG per tablet Take 1 tablet by mouth daily 84 tablet 4   • LORazepam (ATIVAN) 1 mg tablet Take 1 tablet (1 mg total) by mouth daily as needed for anxiety 30 tablet 2   • ondansetron (Zofran ODT) 4 mg disintegrating tablet Take 1 tablet (4 mg total) by mouth every 6 (six) hours as needed for nausea or vomiting 20 tablet 3   • traZODone (DESYREL) 100 mg tablet Take 1 tablet (100 mg total) by mouth daily at bedtime 30 tablet 2     No current facility-administered medications for this visit  Allergies   Allergen Reactions   • Pineapple - Food Allergy Anaphylaxis   • Penicillins Hives and Vomiting       Review of Systems    Video Exam    There were no vitals filed for this visit  Physical Exam     Visit Time  I have spent FOUR GROUP HOURS + CASE MANAGEMENT minutes with patient today in which greater than 50% of the time was spent in counseling/coordination of care regarding PHP- see notes

## 2023-04-05 ENCOUNTER — OFFICE VISIT (OUTPATIENT)
Dept: PSYCHOLOGY | Facility: CLINIC | Age: 23
End: 2023-04-05

## 2023-04-05 DIAGNOSIS — F41.1 GAD (GENERALIZED ANXIETY DISORDER): Primary | ICD-10-CM

## 2023-04-05 DIAGNOSIS — F33.2 SEVERE EPISODE OF RECURRENT MAJOR DEPRESSIVE DISORDER, WITHOUT PSYCHOTIC FEATURES (HCC): ICD-10-CM

## 2023-04-05 NOTE — PSYCH
Subjective:     Patient ID: Nikia Baca is a 20 y o  female  [facility] Clinical Progress Notes      Specialized Services Documentation  Therapist must complete separate progress note for each specific clinical activity in which the individual participated during the day  Allied Therapy   Group was facilitated virtually in a private office using HIPAA Compliant and Approved [employer] Teams  Nikia Baca  consented to the use of tele-video modality of treatment and was virtually present for group allied therapy today  [id number] Nikia Baca  attentively listened to [facility] share her life story as she co-led this session  Group encouraged power of learning about self, accepting illness and personal responsibility in recovery  Community resources reviewed in addition to personal resources like the affirmations  Some progress toward goals noted  Continue psychotherapy to encourage self -awareness and healthy engagement of supports  Tx Plan Objective: 1 1,1 2,1 4, Therapist:  CAROL Rubin    Education Therapy  [id number] Nikia Baca engaged throughout the treatment day  Was engaged in learning related to Illness, Medication, Aftercare and Wellness Tools  Staff utilized Verbal, Written, A/V and Demonstration teaching methods  Nikia Baca shared area of learning and set a goal for outside of program to attend her mother's birthday party        Tx Plan Objective: 1 1,1 2,1 4, Therapist:  CAROL Rubin

## 2023-04-05 NOTE — PSYCH
Subjective:     Patient ID: Fay Edmond is a 20 y o  female  [facility] Clinical Progress Notes      Specialized Services Documentation  Therapist must complete separate progress note for each specific clinical activity in which the individual participated during the day  Case Management Note    Isabela BASS RN    Current suicide risk : Low     A case management session is not scheduled today with Fay Edmond ; additionally, they did not request a CM meeting  Fay Edmond is aware of next scheduled 1:1     Medications changes/added/denied? No    Treatment session number: 3    Individual Case Management Visit provided today?  No    Innovations follow up physician's orders: None at this time

## 2023-04-05 NOTE — PSYCH
Virtual Regular Visit    Verification of patient location:    Patient is located in the following state in which I hold an active license [place]      Assessment/Plan:    Problem List Items Addressed This Visit        Other    Severe episode of recurrent major depressive disorder, without psychotic features ([facility] [address] )    JADEN (generalized anxiety disorder) - Primary       Goals addressed in session: Goal 1          Reason for visit is VIRTUAL PHP GROUP DUE TO COVID-19  Encounter provider [facility] PARTIAL PSYCH PROGRAM    Provider located at [address] [place] [address]   [address] [facility] [id number]      Recent Visits  Date Type Provider Dept   04/04/23 Office Visit [facility] PARTIAL PSYCH GROUP THERAPY [facility]   04/03/23 Office Visit [facility] PARTIAL PSYCH GROUP THERAPY [facility]   Showing recent visits within past 7 days and meeting all other requirements  Today's Visits  Date Type Provider Dept   04/05/23 Office Visit [facility] PARTIAL PSYCH GROUP THERAPY [facility]   Showing today's visits and meeting all other requirements  Future Appointments  No visits were found meeting these conditions  Showing future appointments within next 150 days and meeting all other requirements       The patient was identified by name and date of birth  Hanley Boast was informed that this is a telemedicine visit and that the visit is being conducted [employer]  She agrees to proceed     My office door was closed  No one else was in the room  She acknowledged consent and understanding of privacy and security of the video platform  The patient has agreed to participate and understands they can discontinue the visit at any time  Patient is aware this is a billable service  Subjective  Hanley Boast is a 20 y o  female         HPI     Past Medical History:   Diagnosis Date   • Anemia    • Anxiety    • Asthma     hx of as child - outgrew    • Depression    • Gastritis    • Gastroparesis    • GERD (gastroesophageal reflux disease)    • Lyme disease        Past Surgical History:   Procedure Laterality Date   • ESOPHAGOGASTRODUODENOSCOPY      EGD with bipsy   • UPPER GASTROINTESTINAL ENDOSCOPY         Current Outpatient Medications   Medication Sig Dispense Refill   • ARIPiprazole (ABILIFY) 5 mg tablet Take 1 tablet (5 mg total) by mouth daily 90 tablet 1   • DULoxetine (Cymbalta) 30 mg delayed release capsule Take cymbalta 60 mg in the morning (one 60 mg tablet) and 30 mg at night (one 30 mg tablet) for a total of 90 mg daily 30 capsule 2   • DULoxetine (CYMBALTA) 60 mg delayed release capsule Take cymbalta 60 mg in the morning (one 60 mg tablet) and 30 mg at night (one 30 mg tablet) for a total of 90 mg daily 60 capsule 2   • levonorgestrel-ethinyl estradiol (Marlissa) 0 15-30 MG-MCG per tablet Take 1 tablet by mouth daily 84 tablet 4   • LORazepam (ATIVAN) 1 mg tablet Take 1 tablet (1 mg total) by mouth daily as needed for anxiety 30 tablet 2   • ondansetron (Zofran ODT) 4 mg disintegrating tablet Take 1 tablet (4 mg total) by mouth every 6 (six) hours as needed for nausea or vomiting 20 tablet 3   • traZODone (DESYREL) 100 mg tablet Take 1 tablet (100 mg total) by mouth daily at bedtime 30 tablet 2     No current facility-administered medications for this visit  Allergies   Allergen Reactions   • Pineapple - Food Allergy Anaphylaxis   • Penicillins Hives and Vomiting       Review of Systems    Video Exam    There were no vitals filed for this visit  Physical Exam     Visit Time  I have spent FOUR GROUP HOURS + CASE MANAGEMENT minutes with patient today in which greater than 50% of the time was spent in counseling/coordination of care regarding PHP- see notes

## 2023-04-05 NOTE — PSYCH
"  Subjective:     Patient ID: Karina Mcdaniels is a 20 y o  female  [facility]ations Clinical Progress Notes      Specialized Services Documentation  Therapist must complete separate progress note for each specific clinical activity in which the individual participated during the day  Group Psychotherapy [employer]  ([id number])  Karina Mcdaniels actively engaged in group focused on developing self-compassion which was facilitated virtually in a private office using HIPAA Compliant and Approved [employer] Teams  Nadja Yeh consented to the use of tele-video modality of treatment and was virtually present for group psychotherapy today  Group members discussed what is self compassion  During the group we discussed the benefits of self-compassion and how it helps improve well-being, connection to others, increases selflessness, regulates emotions, reduces depression and anxiety  Group members practiced self-compassion by completing self-compassion exercises drawn from \"The Self-Compassion Deck\"  Nadja Yeh discussed how they could bring self-compassion into their lives   Nadja Yeh continues to make progress towards goals through verbal participation in group; to accomplish long term goals continue to utilize skills learned in programming  Continue with psychotherapy to educate and encourage use of wellness tools  Tx Plan Objective: 1 1,1 2 Therapist: [facility]     Education Therapy   [id number] Karina Mcdaniels actively shared in morning assessment and goal review  Presented as Receptive related to readiness to learn  Karina Mcdaniels did complete goal from last treatment day identifying gaining education  did not present with any barriers to learning         Tx Plan Objective: 1 1,1 2,1 4 Therapist:  Brice Tellez, MSW, LSW    "

## 2023-04-06 ENCOUNTER — OFFICE VISIT (OUTPATIENT)
Dept: PSYCHOLOGY | Facility: CLINIC | Age: 23
End: 2023-04-06

## 2023-04-06 ENCOUNTER — TELEPHONE (OUTPATIENT)
Dept: PSYCHIATRY | Facility: CLINIC | Age: 23
End: 2023-04-06

## 2023-04-06 DIAGNOSIS — F41.1 GAD (GENERALIZED ANXIETY DISORDER): Primary | ICD-10-CM

## 2023-04-06 DIAGNOSIS — F33.2 SEVERE EPISODE OF RECURRENT MAJOR DEPRESSIVE DISORDER, WITHOUT PSYCHOTIC FEATURES (HCC): ICD-10-CM

## 2023-04-06 NOTE — PSYCH
Visit Time    Visit Start Time: 4990  Visit Stop Time: 8991  Total Visit Duration: 60 minutes    Subjective:     Patient ID: Juli Ferguson is a 20 y o  female  [facility] Clinical Progress Notes      Specialized Services Documentation  Therapist must complete separate progress note for each specific clinical activity in which the individual participated during the day  Group Psychotherapy - Relaxation Techniques  This group was facilitated virtually in a private office using HIPAA compliant and approved [employer] teams  Juli Ferguson attended group on relaxation techniques  Today group members focused on understanding their experience/relationship with stress  Members evaluated their stressors and identified the types of stress they experience  The goal of today's group was for members to openly discuss their stressors and identify their coping methods  As well as evaluate a variety of relaxation techniques they could utilize when stressed   facilitated discussion on:   • What are your current stressors? Symptoms? • What coping skills do you currently use? Members also participated in group through completing guided relaxation practice  This writer encouraged members to openly share and integrate the techniques within their daily routine  Juli Ferguson made good effort towards progress goals which were displayed through participation, notetaking, and engagement in topic  Juli Ferguson intends on integrating the deep breathing technique within their routine     [facility] Objective:1 1,1 2,1 4   [facility]

## 2023-04-06 NOTE — PSYCH
Subjective:     Patient ID: Ronny Lobo is a 20 y o  female  [facility]ations Clinical Progress Notes      Specialized Services Documentation  Therapist must complete separate progress note for each specific clinical activity in which the individual participated during the day  Group Psychotherapy [employer]  ([id number]) Ronny Lobo actively engaged in group focused on fun movement activities which was facilitated virtually in a private office using HIPAA Compliant and Approved [employer] Teams  Brenda Ramirez consented to the use of tele-video modality of treatment and was virtually present for group psychotherapy today  The group shared fun memories from the past that involved movement activities that they have done and discussed the feelings associated with it  The group created a list of movement activities that they would like to try or do more of in order to relieve stress  Brenda Ramirez stated that they would like to do yoga  The group discussed the benefits that movement has on mental health  Group members listened to a short clip, Movement and Mental Health, which discussed habit stacking and the benefits of movement  Brenda Ramirez continues to make progress towards goals through verbal participation in group; to accomplish long term goals continue to utilize skills learned in programming  Continue with psychotherapy to educate and encourage use of wellness tools  Tx Plan Objective: 1 1,1 2 Therapist: Kaylee Pagan    Education Therapy   [id number] Ronny Lobo actively shared in morning assessment and goal review  Presented as Receptive related to readiness to learn  Ronny Lobo did complete goal from last treatment day identifying gaining support  did not present with any barriers to learning       Tx Plan Objective: 1 1,1 2,1 4 Therapist:  Demetra Kruse, MSW, LSW

## 2023-04-06 NOTE — PSYCH
Subjective:     Patient ID: Adalid Guillaume is a 20 y o  female  [facility]ations Clinical Progress Notes      Specialized Services Documentation  Therapist must complete separate progress note for each specific clinical activity in which the individual participated during the day  Allied Therapy   Group was facilitated virtually in a private office using HIPAA compliant and approved [employer] Teams  Adalid Guillaume  consented to the use of tele-video modality of treatment and was virtually present for group allied therapy  [phone] Adalid Guillaume  participated in the [facility] group focused on being able to identify the difference between rational and irrational  Kaden Blanchard engaged in a moris analysis as well as a guided visualization  Group explored healthy ways to overcome anxiety related to irrational fears  Kaden Blanchard shared appropriately and learned a five step process to overcome anxiety relating to irrational fears  Some effort noted toward treatment goal  Continue AT to encourage the development and practice of challenging irrational fears  Tx Plan Objective: 1 1,1 2,1 4, Therapist:  CAROL Olmstead    Education Therapy   [phone] Adalid Guillaume engaged throughout the treatment day  Was engaged in learning related to Illness, Medication, Aftercare and Wellness Tools  Staff utilized Verbal, Written, A/V and Demonstration teaching methods  Adalid Guillaume shared area of learning and set a goal for outside of program to go for a walk        Tx Plan Objective: 1 1,1 2,1 4, Therapist:  CAROL Olmstead

## 2023-04-06 NOTE — PSYCH
Virtual Regular Visit    Verification of patient location:    Patient is located in the following state in which I hold an active license [place]      Assessment/Plan:    Problem List Items Addressed This Visit        Other    Severe episode of recurrent major depressive disorder, without psychotic features ([facility] [address] )    JADEN (generalized anxiety disorder) - Primary       Goals addressed in session:           Reason for visit is PHP VIRTUAL GROUP DUE TO COVID-19      Encounter provider [address] PARTIAL [address]    Provider located at [address] [place] [address]   [id number] [facility] [id number]      Recent Visits  Date Type Provider Dept   04/05/23 Office Visit [address] PARTIAL PSYCH GROUP THERAPY [facility]   04/04/23 Office Visit [address] PARTIAL PSYCH GROUP THERAPY [facility]   04/03/23 Office Visit [address] PARTIAL PSYCH GROUP THERAPY [facility]   Showing recent visits within past 7 days and meeting all other requirements  Future Appointments  No visits were found meeting these conditions  Showing future appointments within next 150 days and meeting all other requirements       The patient was identified by name and date of birth  J Carlos Handley was informed that this is a telemedicine visit and that the visit is being conducted [employer]  She agrees to proceed     My office door was closed  No one else was in the room  She acknowledged consent and understanding of privacy and security of the video platform  The patient has agreed to participate and understands they can discontinue the visit at any time  Patient is aware this is a billable service  Subjective  J Carlos Handley is a 20 y o  female         HPI     Past Medical History:   Diagnosis Date   • Anemia    • Anxiety    • Asthma     hx of as child - outgrew    • Depression    • Gastritis    • Gastroparesis    • GERD (gastroesophageal reflux disease) • Lyme disease        Past Surgical History:   Procedure Laterality Date   • ESOPHAGOGASTRODUODENOSCOPY      EGD with bipsy   • UPPER GASTROINTESTINAL ENDOSCOPY         Current Outpatient Medications   Medication Sig Dispense Refill   • ARIPiprazole (ABILIFY) 5 mg tablet Take 1 tablet (5 mg total) by mouth daily 90 tablet 1   • DULoxetine (Cymbalta) 30 mg delayed release capsule Take cymbalta 60 mg in the morning (one 60 mg tablet) and 30 mg at night (one 30 mg tablet) for a total of 90 mg daily 30 capsule 2   • DULoxetine (CYMBALTA) 60 mg delayed release capsule Take cymbalta 60 mg in the morning (one 60 mg tablet) and 30 mg at night (one 30 mg tablet) for a total of 90 mg daily 60 capsule 2   • levonorgestrel-ethinyl estradiol (Marlissa) 0 15-30 MG-MCG per tablet Take 1 tablet by mouth daily 84 tablet 4   • LORazepam (ATIVAN) 1 mg tablet Take 1 tablet (1 mg total) by mouth daily as needed for anxiety 30 tablet 2   • ondansetron (Zofran ODT) 4 mg disintegrating tablet Take 1 tablet (4 mg total) by mouth every 6 (six) hours as needed for nausea or vomiting 20 tablet 3   • traZODone (DESYREL) 100 mg tablet Take 1 tablet (100 mg total) by mouth daily at bedtime 30 tablet 2     No current facility-administered medications for this visit  Allergies   Allergen Reactions   • Pineapple - Food Allergy Anaphylaxis   • Penicillins Hives and Vomiting       Review of Systems    Video Exam    There were no vitals filed for this visit  Physical Exam     I spent FOUR GROUP HOURS PLUS CASE MANAGEMENT minutes with patient today in which greater than 50% of the time was spent in counseling/coordination of care regarding PHP - SEE NOTES

## 2023-04-07 ENCOUNTER — DOCUMENTATION (OUTPATIENT)
Dept: PSYCHOLOGY | Facility: CLINIC | Age: 23
End: 2023-04-07

## 2023-04-07 ENCOUNTER — APPOINTMENT (OUTPATIENT)
Dept: PSYCHOLOGY | Facility: CLINIC | Age: 23
End: 2023-04-07

## 2023-04-07 NOTE — PROGRESS NOTES
Subjective:     Patient ID: Gwen An is a 20 y o  female  [facility] Clinical Progress Notes      Specialized Services Documentation  Therapist must complete separate progress note for each specific clinical activity in which the individual participated during the day  Case Management Note  Gwen An emailed this writer indicating she was not feeling well and would not be in group  Excused from [facility] today    Isaak Zurita, RN, BSN

## 2023-04-12 ENCOUNTER — APPOINTMENT (OUTPATIENT)
Dept: PSYCHOLOGY | Facility: CLINIC | Age: 23
End: 2023-04-12

## 2023-04-13 ENCOUNTER — APPOINTMENT (OUTPATIENT)
Dept: PSYCHOLOGY | Facility: CLINIC | Age: 23
End: 2023-04-13

## 2023-04-14 ENCOUNTER — APPOINTMENT (OUTPATIENT)
Dept: PSYCHOLOGY | Facility: CLINIC | Age: 23
End: 2023-04-14

## 2023-05-01 ENCOUNTER — SOCIAL WORK (OUTPATIENT)
Dept: BEHAVIORAL/MENTAL HEALTH CLINIC | Facility: CLINIC | Age: 23
End: 2023-05-01

## 2023-05-01 DIAGNOSIS — F41.1 GENERALIZED ANXIETY DISORDER: Primary | ICD-10-CM

## 2023-05-01 NOTE — PSYCH
"Behavioral Health Psychotherapy Progress Note    Psychotherapy Provided: Individual Psychotherapy     1  Generalized anxiety disorder            Goals addressed in session: Goal 1     DATA: Patient reports she is doing better  Used an example of hanging lights on the deck and becoming overwhelmed to explore possible solutions in the future  Validated patient's behavior of retreating when overwhelmed but acknowledged with practice she can use other coping skills  Patient is excited about having friends from [place], [place] come visit for a few days  She has planned time in [address] [place] [address] and at home to show them like the city mouse and country mouse  During this session, this clinician used the following therapeutic modalities: Cognitive Behavioral Therapy    Substance Abuse was not addressed during this session  If the client is diagnosed with a co-occurring substance use disorder, please indicate any changes in the frequency or amount of use: n/a  Stage of change for addressing substance use diagnoses: No substance use/Not applicable    ASSESSMENT:  Con Decant presents with a Euthymic/ normal mood  her affect is Normal range and intensity, which is congruent, with her mood and the content of the session  The client has made progress on their goals  Sean Jacques presents with a none risk of suicide, none risk of self-harm, and none risk of harm to others  For any risk assessment that surpasses a \"low\" rating, a safety plan must be developed  A safety plan was indicated: no  If yes, describe in detail n/a    PLAN: Between sessions, Sean Jacques will practice saying No and observe how the world does not come to an end when that happens  At the next session, the therapist will use Cognitive Behavioral Therapy to address self mood regulation  Behavioral Health Treatment Plan and Discharge Planning: Sean Jacques is aware of and agrees to continue to work on their treatment plan   They have identified and are " working toward their discharge goals   yes    Visit start and stop times:    05/01/23  Start Time: 1158  Stop Time: 1244  Total Visit Time: 46 minutes

## 2023-05-11 ENCOUNTER — TELEMEDICINE (OUTPATIENT)
Dept: PSYCHIATRY | Facility: CLINIC | Age: 23
End: 2023-05-11

## 2023-05-11 DIAGNOSIS — F41.9 ANXIETY DISORDER, UNSPECIFIED TYPE: ICD-10-CM

## 2023-05-11 DIAGNOSIS — G47.00 INSOMNIA, UNSPECIFIED TYPE: ICD-10-CM

## 2023-05-11 DIAGNOSIS — Z13.21 SCREENING FOR ENDOCRINE, NUTRITIONAL, METABOLIC AND IMMUNITY DISORDER: Primary | ICD-10-CM

## 2023-05-11 DIAGNOSIS — Z13.228 SCREENING FOR ENDOCRINE, NUTRITIONAL, METABOLIC AND IMMUNITY DISORDER: Primary | ICD-10-CM

## 2023-05-11 DIAGNOSIS — F33.1 MDD (MAJOR DEPRESSIVE DISORDER), RECURRENT EPISODE, MODERATE (HCC): Chronic | ICD-10-CM

## 2023-05-11 DIAGNOSIS — Z13.0 SCREENING FOR ENDOCRINE, NUTRITIONAL, METABOLIC AND IMMUNITY DISORDER: Primary | ICD-10-CM

## 2023-05-11 DIAGNOSIS — Z13.29 SCREENING FOR ENDOCRINE, NUTRITIONAL, METABOLIC AND IMMUNITY DISORDER: Primary | ICD-10-CM

## 2023-05-11 RX ORDER — DULOXETIN HYDROCHLORIDE 60 MG/1
CAPSULE, DELAYED RELEASE ORAL
Qty: 60 CAPSULE | Refills: 2 | Status: SHIPPED | OUTPATIENT
Start: 2023-05-11

## 2023-05-11 RX ORDER — ARIPIPRAZOLE 5 MG/1
5 TABLET ORAL DAILY
Qty: 30 TABLET | Refills: 1 | Status: SHIPPED | OUTPATIENT
Start: 2023-05-11 | End: 2023-06-10

## 2023-05-11 NOTE — PSYCH
Virtual Visit Disclaimer & Required Documentation  TeleMed provider: Ishmael Blue MD  and Dr Delaney Staff, located at [address] [facility] [address] in [place], [place], [facility]  The patient is also located in [place] which is the state in which I hold an active license  The patient was identified by name and date of birth  Dennis Chicas was informed that this is a telemedicine visit and that the visit is being conducted through [facility] and patient was informed this is a secure, HIPAA-complaint platform  She agrees to proceed  My office door was closed  No one else was in the room  She acknowledged consent and understanding of privacy and security of the video platform  The patient has agreed to participate and understands they can discontinue the visit at any time  Dennis Chicas verbally agrees to participate in [employer]  Pt is aware that [employer] could be limited without vital signs or the ability to perform a full hands-on physical exam  The patient understands she or the provider may request at any time to terminate the video visit and request the patient to seek care or treatment in person  Patient is aware this is a billable service  Psychiatric Follow Up Visit - Behavioral Health   MRN: [id number]    Visit Time  Start: 9:15am  Stop: 10:15am    Total Visit Duration: 60 minutes    History of Present Illness   Grace Doherty is 22 y o  and now presenting to follow up for anxiety, depression, mood instability, Irritability and insomnia  At last appointment, patient was complaining of continued depression and anxiety in the setting of difficult psychosocial stressors, hopelessness, isolation, periods of tearfulness  She was agreeable to increasing her Cymbalta to 60 mg in the morning and 30 mg nightly along with continuation of Abilify 5 mg daily and trazodone 100 mg nightly  Also Ativan 1 mg daily when needed    To address patient's difficulties with psychosocial stressors at home and in her job search, along with stress tolerance she was started at 1 week of partial hospitalization program     Today, Giovani Iniguez states that partial hospitalization program was effective and she learned numerous coping strategies  She feels 3/10 depression and 3/10 anxiety and reports her anxiety is mostly in the form of feeling restless, wanting to walk around and do things throughout the day  She states while she was at her partial hospitalization program they increased Abilify to 7 5 mg daily  She understands that this may be the cause of some of her inner restlessness and is agreeable with decreasing the medication moving forward  She denies manic episodes, PTSD related symptoms, panic attacks, sleep difficulties, or any energy changes  She does smoke marijuana regularly, and has been decreasing her frequency and a amount  Psychosocial stressors continue, at the same rate as before, but she has developed good coping strategies to address this  She feels the Cymbalta has helped significantly in both her anxiety and depression  She would like to follow up in 2 5 months for further medication management and optimization and will continue with therapy regularly  PHQ-9 score of 3 and JADEN-7 score of 11  Psychiatric Review Of Systems:  • Giovani Iniguez reports Symptoms as described in HPI  • Giovani Iniguez denies Current suicidal thoughts, plan, or intent, Current thoughts of self-harm or Current homicidal thoughts, plan, or intent      Medical Review Of Systems:  Complete review of systems is negative except as noted above     ------------------------------------  Past Medical History:   Diagnosis Date   • Anemia    • Anxiety    • Asthma     hx of as child - outgrew    • Depression    • Gastritis    • Gastroparesis    • GERD (gastroesophageal reflux disease)    • Lyme disease       Past Surgical History:   Procedure Laterality Date   • ESOPHAGOGASTRODUODENOSCOPY      EGD with bipsy   • "UPPER GASTROINTESTINAL ENDOSCOPY         Visit Vitals  OB Status Birth Control   Smoking Status Never      Wt Readings from Last 6 Encounters:   10/13/22 69 9 kg (154 lb)   08/24/22 68 kg (150 lb)   06/17/22 66 5 kg (146 lb 9 6 oz)   03/21/22 67 1 kg (148 lb)   12/15/21 67 1 kg (148 lb)   08/06/21 64 4 kg (142 lb)        Mental Status Exam:  Appearance:  alert, good eye contact, appears stated age, casually dressed and appropriate grooming and hygiene   Behavior:  calm and cooperative   Motor: no abnormal movements and normal gait and balance   Speech:  spontaneous and coherent   Mood:  \"better\"   Affect:  constricted   Thought Process:  Organized, logical, goal-directed   Thought Content: no verbalized delusions or overt paranoia   Perceptual disturbances: no reported hallucinations and does not appear to be responding to internal stimuli at this time   Risk Potential: No active or passive suicidal or homicidal ideation was verbalized during interview   Cognition: oriented to self and situation, appears to be of average intelligence and cognition not formally tested   Insight:  Fair   Judgment: Fair       Meds/Allergies    Allergies   Allergen Reactions   • Pineapple - Food Allergy Anaphylaxis   • Penicillins Hives and Vomiting     Current Outpatient Medications   Medication Instructions   • ARIPiprazole (ABILIFY) 7 5 mg, Oral, Daily   • DULoxetine (Cymbalta) 30 mg delayed release capsule Take cymbalta 60 mg in the morning (one 60 mg tablet) and 30 mg at night (one 30 mg tablet) for a total of 90 mg daily   • DULoxetine (CYMBALTA) 60 mg delayed release capsule Take cymbalta 60 mg in the morning (one 60 mg tablet) and 30 mg at night (one 30 mg tablet) for a total of 90 mg daily   • levonorgestrel-ethinyl estradiol (Marlissa) 0 15-30 MG-MCG per tablet 1 tablet, Oral, Daily   • LORazepam (ATIVAN) 1 mg, Oral, Daily PRN   • ondansetron (ZOFRAN ODT) 4 mg, Oral, Every 6 hours PRN   • traZODone (DESYREL) 100 mg, Oral, " Daily at bedtime        Labs & Imaging:  I have personally reviewed all pertinent laboratory tests and imaging results  No visits with results within 2 Month(s) from this visit     Latest known visit with results is:   Appointment on 07/19/2022   Component Date Value Ref Range Status   • PTH 07/19/2022 30 2  18 4 - 80 1 pg/mL Final   • WBC 07/19/2022 8 19  4 31 - 10 16 Thousand/uL Final   • RBC 07/19/2022 4 60  3 81 - 5 12 Million/uL Final   • Hemoglobin 07/19/2022 13 3  11 5 - 15 4 g/dL Final   • Hematocrit 07/19/2022 41 9  34 8 - 46 1 % Final   • MCV 07/19/2022 91  82 - 98 fL Final   • MCH 07/19/2022 28 9  26 8 - 34 3 pg Final   • MCHC 07/19/2022 31 7  31 4 - 37 4 g/dL Final   • RDW 07/19/2022 13 0  11 6 - 15 1 % Final   • MPV 07/19/2022 10 1  8 9 - 12 7 fL Final   • Platelets 06/45/3090 286  149 - 390 Thousands/uL Final   • nRBC 07/19/2022 0  /100 WBCs Final   • Neutrophils Relative 07/19/2022 50  43 - 75 % Final   • Immat GRANS % 07/19/2022 0  0 - 2 % Final   • Lymphocytes Relative 07/19/2022 39  14 - 44 % Final   • Monocytes Relative 07/19/2022 7  4 - 12 % Final   • Eosinophils Relative 07/19/2022 3  0 - 6 % Final   • Basophils Relative 07/19/2022 1  0 - 1 % Final   • Neutrophils Absolute 07/19/2022 4 16  1 85 - 7 62 Thousands/µL Final   • Immature Grans Absolute 07/19/2022 0 03  0 00 - 0 20 Thousand/uL Final   • Lymphocytes Absolute 07/19/2022 3 16  0 60 - 4 47 Thousands/µL Final   • Monocytes Absolute 07/19/2022 0 53  0 17 - 1 22 Thousand/µL Final   • Eosinophils Absolute 07/19/2022 0 24  0 00 - 0 61 Thousand/µL Final   • Basophils Absolute 07/19/2022 0 07  0 00 - 0 10 Thousands/µL Final   • Sodium 07/19/2022 140  135 - 147 mmol/L Final   • Potassium 07/19/2022 4 0  3 5 - 5 3 mmol/L Final   • Chloride 07/19/2022 110 (H)  96 - 108 mmol/L Final   • CO2 07/19/2022 25  21 - 32 mmol/L Final   • ANION GAP 07/19/2022 5  4 - 13 mmol/L Final   • BUN 07/19/2022 8  5 - 25 mg/dL Final   • Creatinine 07/19/2022 0 87 0 60 - 1 30 mg/dL Final    Standardized to IDMS reference method   • Glucose, Fasting 07/19/2022 91  65 - 99 mg/dL Final    Specimen collection should occur prior to Sulfasalazine administration due to the potential for falsely depressed results  Specimen collection should occur prior to Sulfapyridine administration due to the potential for falsely elevated results  • Calcium 07/19/2022 8 8  8 3 - 10 1 mg/dL Final   • Corrected Calcium 07/19/2022 9 3  8 3 - 10 1 mg/dL Final   • AST 07/19/2022 15  5 - 45 U/L Final    Specimen collection should occur prior to Sulfasalazine administration due to the potential for falsely depressed results  • ALT 07/19/2022 18  12 - 78 U/L Final    Specimen collection should occur prior to Sulfasalazine and/or Sulfapyridine administration due to the potential for falsely depressed results  • Alkaline Phosphatase 07/19/2022 68  46 - 116 U/L Final   • Total Protein 07/19/2022 7 5  6 4 - 8 4 g/dL Final   • Albumin 07/19/2022 3 4 (L)  3 5 - 5 0 g/dL Final   • Total Bilirubin 07/19/2022 0 26  0 20 - 1 00 mg/dL Final    Use of this assay is not recommended for patients undergoing treatment with eltrombopag due to the potential for falsely elevated results  • eGFR 07/19/2022 94  ml/min/1 73sq m Final   • Hemoglobin A1C 07/19/2022 5 2  Normal 3 8-5 6%; PreDiabetic 5 7-6 4%; Diabetic >=6 5%; Glycemic control for adults with diabetes <7 0% % Final   • EAG 07/19/2022 103  mg/dl Final   • TSH 3RD GENERATON 07/19/2022 2 460  0 450 - 4 500 uIU/mL Final    The recommended reference ranges for TSH during pregnancy are as follows:   First trimester 0 1 to 2 5 uIU/mL   Second trimester  0 2 to 3 0 uIU/mL   Third trimester 0 3 to 3 0 uIU/m    Note: Normal ranges may not apply to patients who are transgender, non-binary, or whose legal sex, sex at birth, and gender identity differ    Adult TSH (3rd generation) reference range follows the recommended guidelines of the American Thyroid Association, January, 2020  • Cholesterol 07/19/2022 173  See Comment mg/dL Final    Cholesterol:         Pediatric <18 Years        Desirable          <170 mg/dL      Borderline High    170-199 mg/dL      High               >=200 mg/dL        Adult >=18 Years            Desirable         <200 mg/dL      Borderline High   200-239 mg/dL      High              >239 mg/dL     • Triglycerides 07/19/2022 63  See Comment mg/dL Final    Triglyceride:     0-9Y            <75mg/dL     10Y-17Y         <90 mg/dL       >=18Y     Normal          <150 mg/dL     Borderline High 150-199 mg/dL     High            200-499 mg/dL        Very High       >499 mg/dL    Specimen collection should occur prior to N-Acetylcysteine or Metamizole administration due to the potential for falsely depressed results  • HDL, Direct 07/19/2022 68  >=50 mg/dL Final    Specimen collection should occur prior to Metamizole administration due to the potential for falsley depressed results  • LDL Calculated 07/19/2022 92  0 - 100 mg/dL Final    LDL Cholesterol:     Optimal           <100 mg/dl     Near Optimal      100-129 mg/dl     Above Optimal       Borderline High 130-159 mg/dl       High            160-189 mg/dl       Very High       >189 mg/dl         This screening LDL is a calculated result  It does not have the accuracy of the Direct Measured LDL in the monitoring of patients with hyperlipidemia and/or statin therapy  Direct Measure LDL (CUC888) must be ordered separately in these patients  • Non-HDL-Chol (CHOL-HDL) 07/19/2022 105  mg/dl Final     ---------------------------------------    Historical Information   Information is copied from the previous visit and updated today as appropriate      Past Psychiatric History:    No significant PPH, no past psychiatric hospitalizations, no past suicide attempts, no h/o self-injurious behaviors, teen superficial cutting 2 months in setting of anxiety, jan 2022 ago cut again due to dropping out of masters program because disliked it, no h/o physical altercations  Currently in outpatient therapy through [facility] virtual therapy on weekly basis      Past Med Trials: Fluoxetine up to 40 mg daily (ineffective after some time), Seroquel XR up to 150 mg qhs (drowsiness), gabapentin (drowsy, hard to remember doses), zoloft (ineffective after some time)        Family Psychiatric History:    Father- Bipolar I d/o (on Prozac, Seroquel)  Pat  Great Grandmother- Schizophrenia  Mother- Panic Attacks (Xanax, previously on Zoloft)  Mat  Grandmother- Anxiety  Maternal Side- Chronic abdominal pain     Denies substance abuse or suicidality in immediate relations          Social History:  Domiciled with parents, brother (15 y/o) in [place] [address]  Mother employed in [place], father employed as pharmaceutical consultant  Reports infrequent caffeine use   No active substance use  Reports that she broke up with boyfriend in 2/2021   No current relationships         Substance Abuse History:  Alcohol- drinking occasionally on weekends, a couple of drinks, about once per week, limited alcohol use  Cannabis- occasional use, about once per month, currently using medical marijuana on daily basis, has medical [employer] card and smokes daily (says its for anxiety and appetite stimulant)        Traumatic History:  Denies any h/o physical or sexual abuse  The following portions of the patient's history were reviewed and updated as appropriate: allergies, current medications, past family history, past medical history, past social history, past surgical history and problem list     Assessment/Plan:   Adam Perez is a 20 y o , female, possessing a previous psychiatric history significant for  anxiety, depression, insomnia (rule out bipolar), no past psychiatric hospitalizations, no past suicide attempts, no h/o self-injurious behaviors, no h/o physical altercations, PMH significant for h/o lyme disease, gastritis, h/o esophageal candidiasis, daily medical marijuana use, and history of heavy alcohol use in college, presenting to the [address] [place] [address] [address] outpatient clinic [facility] for for follow-up regarding medication management  In the setting of restlessness on the higher dose of Abilify at 7 5 mg daily, patient is agreeable with decreasing it back down to 5 mg daily  She is improved in terms of anxiety and depression on both subjective and objective measurements and only reports restlessness related to her anxiety  In the setting of this, she is agreeable with continuing the higher dose of Cymbalta that she had increased a prior visit and following up in 3 months for further medication management and optimization  She will continue with trazodone 100 mg nightly and Ativan 1 mg daily for generalized anxiety disorder and panic attack  Partial hospitalization program went well, she learned numerous coping strategies that she will implement moving forward, and she has decreased her frequency and amount of marijuana usage  Denies alcohol cravings  Her psychosocial stressors are consistent and she is working towards dealing with them such as job applications and getting along with family members and friends  DSM-5 Diagnosis:   1  Major depressive disorder, recurrent episode, mild- not at goal  2  Anxiety disorder, unspecified- not at goal  3  Insomnia, unspecified- at goal    Treatment Recommendations/Precautions:  • Continue Cymbalta 60 mg qAM and 30 mg qHS for anxiety, depression, mood stability  • Decrease Abilify 5 mg daily for mood stability, as augmentation for depression  • Abilify was increased to 7 5 mg while at partial and patient felt a sense of inner restlessness, trouble relaxing, possible akathisia worsening; continue to assess  • Continue trazodone 100 mg q h s  for insomnia, depression, generalized anxiety  • Continue Ativan 1 mg daily for JADEN and panic attack prevention  ?  States she has been using 1 pill every 2 week for panic attack prevention  • Partial Hospitalization Program recently completed for 1 week, beneficial for coping strategies for anxiety / stressors    • Therapy:  • Referral for individual psychotherapy; pending  Patient will look into  • Medical:   ? Follow up with primary care physician for ongoing medical care   • Labs:   ? Follow up vitamin D level (3rd reminder), all labs   ? Most recently obtained 7/19/2022, reviewed    • Medication management every 2 5 month  • Aware of 24 hour and weekend coverage for urgent situations accessed by calling [facility] main practice number      Controlled Medication Discussion:   Debbylalo Montrell has been filling controlled prescriptions on time as prescribed according to [address] [place] [address] Drive Monitoring Program  • Discussed with Tamra Mckeon the risks of sedation, respiratory depression, impairment of ability to drive and potential for abuse and addiction related to treatment with benzodiazepine medications  She understands risk of treatment with benzodiazepine medications, agrees to not drive if feels impaired and agrees to take medications as prescribed  • Discussed with [facility] warning on concurrent use of benzodiazepines and opioid medications including sedation, respiratory depression, coma and death  She understands the risk of treatment with benzodiazepines in addition to opioids and wants to continue taking those medications  • Discussed with Tamra Mckeon increased risk of impairment related to concurrent use of benzodiazepines and hypnotic medications including excessive sedation, psychomotor impairment and respiratory depression   She understands the risk of treatment with benzodiazepines in addition to hypnotic medications and wants to continue taking those medications  • Discussed with Tamra Mckeon need to slowly taper off benzodiazepines as recommended by [place] [facility] Monitoring Program, due to concurrent use of opioid medications and increased risk of sedation, respiratory depression, coma and death with that combination    Medical Decision Making / Counseling / Coordination of Care: The following interventions are recommended: return in 3 months for follow up or sooner if needed and continue psychotherapy  Although patient's acute lethality risk is LOW, long-term/chronic lethality risk is mildly elevated given the risk factors listed above  However, at the current moment, Tara Carolina is future-oriented, forward-thinking, and demonstrates ability to act in a self-preserving manner as evidenced by volitionally seeking psychiatric evaluation and treatment today  To mitigate future risk, patient should adhere to treatment recommendations, avoid alcohol/illicit substance use, utilize community-based resources and familiar support, and prioritize mental health treatment  The importance of medication and treatment compliance was reviewed with the patient  Individual supportive psychotherapy was provided     The diagnosis and treatment plan were reviewed with the patient  Risks, benefits, and alternatives to treatment were discussed:  • PARQ for neurontin including depression/suicidality, allergic reactions (SJS, angioedema, rhabdomyolysis, eosinophilia, anaphylaxis), dizziness and somnolence, diarrhea, xerostomia, headaches, drug interactions and others    • PARQ completed including serotonin syndrome, SIADH, worsened depression/suicidality, induction of fco, blood pressure changes and GI distress, weight gain, sexual side effects, insomnia, sedation, potential for drug interactions, and others    • PARQ for neurontin including depression/suicidality, allergic reactions (SJS, angioedema, rhabdomyolysis, eosinophilia, anaphylaxis), dizziness and somnolence, diarrhea, xerostomia, headaches, drug interactions and others    • Discussed with patient the risks of sedation, respiratory depression, impairment in judgment and motor function   Depression, mood changes, GI changes, and others discussed   Patient was also informed of risks of being on or starting opioid medications due to drug interactions and potential for serious respiratory depression and death      This note was not shared with the patient due to reasonable likelihood of causing patient harm     Sarah Turpin MD

## 2023-05-11 NOTE — BH TREATMENT PLAN
TREATMENT PLAN        [address] [place] [address]    Name and Date of Birth:  Nella Toussaint 20 y o  2000  Date of Treatment Plan: May 11, 2023  Diagnosis/Diagnoses:    1  Screening for endocrine, nutritional, metabolic and immunity disorder    2  Anxiety disorder, unspecified type    3  MDD (major depressive disorder), recurrent episode, moderate ([facility] [address] )    4  Insomnia, unspecified type        Strengths/Personal Resources for Self-Care: supportive friends, taking medications as prescribed, ability to adapt to life changes, ability to communicate needs, ability to communicate well, ability to listen, ability to reason, ability to understand psychiatric illness, family ties, financial means, independence, motivation for treatment, ability to negotiate basic needs, being resoureceful, self-reliance, well educated, willingness to work on problems, work skills    Area/Areas of need: anxiety, anxiety symptoms, depression, depressive symptoms, mood instability, sleep problems, difficulty tolerating medications, lack of motivation, lack of sleep, substance abuse, time management, unemployment, unstable mood    Long Term Goal: continue improvement in anxiety  Target Date: 6 months - November 11, 2023  Person/Persons responsible for completion of goal: Morris Roa and Yann Crockett MD     Short Term Objective (s) - How will we reach this goal?:   1  Take medications as prescribed  2  Attend psychiatry appointments regularly  3  Get blood work drawn  4  Start psychotherapy  5  Try sleep hygiene techniques  6  Avoid alcohol   7  Avoid drugs   8  Eat a healthy diet   9  Take walks regularly  10  Try breathing exercises  11   Try relaxation techniques  Target Date: 6 months - November 11, 2023  Person/Persons Responsible for Completion of Goal: Morris Roa     Progress Towards Goals: Continuing treatment    Treatment Modality: medication management every 1-3 months as needed, referral for individual psychotherapy and follow up with PCP  Review due 180 days from date of this plan: November 7, 2023   Expected length of service: Ongoing treatment    My physician and I have developed this plan together, and I agree to work on the goals and objectives  I understand the treatment goals that were developed for my treatment  The treatment plan was created between Samia Justin MD and Marii Steven on 05/11/23 at 3:42 PM but not signed at the time of the visit due to [address]  The plan was reviewed, and verbal consent was given

## 2023-05-30 ENCOUNTER — TELEPHONE (OUTPATIENT)
Dept: PSYCHIATRY | Facility: CLINIC | Age: 23
End: 2023-05-30

## 2023-05-30 NOTE — TELEPHONE ENCOUNTER
Patient called to change her 5/31 12pm appt to virtual due to transportation  Switched appt to virtual via [facility]

## 2023-05-31 ENCOUNTER — TELEPHONE (OUTPATIENT)
Dept: PSYCHIATRY | Facility: CLINIC | Age: 23
End: 2023-05-31

## 2023-05-31 NOTE — TELEPHONE ENCOUNTER
Patient called to rescheduled same day 5/31 12pm appt to an in person appt on a different day  Patient was rescheduled for 12pm in the office 9/2

## 2023-06-01 DIAGNOSIS — F33.1 MDD (MAJOR DEPRESSIVE DISORDER), RECURRENT EPISODE, MODERATE (HCC): Chronic | ICD-10-CM

## 2023-06-01 DIAGNOSIS — F41.9 ANXIETY DISORDER, UNSPECIFIED TYPE: ICD-10-CM

## 2023-06-01 RX ORDER — DULOXETIN HYDROCHLORIDE 30 MG/1
CAPSULE, DELAYED RELEASE ORAL
Qty: 90 CAPSULE | Refills: 0 | Status: SHIPPED | OUTPATIENT
Start: 2023-06-01

## 2023-06-02 ENCOUNTER — SOCIAL WORK (OUTPATIENT)
Dept: BEHAVIORAL/MENTAL HEALTH CLINIC | Facility: CLINIC | Age: 23
End: 2023-06-02

## 2023-06-02 DIAGNOSIS — F33.2 SEVERE EPISODE OF RECURRENT MAJOR DEPRESSIVE DISORDER, WITHOUT PSYCHOTIC FEATURES (HCC): Primary | ICD-10-CM

## 2023-06-02 NOTE — PSYCH
"Behavioral Health Psychotherapy Progress Note    Psychotherapy Provided: Individual Psychotherapy     1  Severe episode of recurrent major depressive disorder, without psychotic features ([address] )            Goals addressed in session: Goal 1     DATA: Patient reports there is a lot of family drama going on  Discussed locus of control and self soothing behaviors to keep from getting stressed from other people's drama  Discussed job hunt and ways to get resume noticed  Patient would like to go back to school but would also like to move to [facility] where her best friend lives  Encouraged patient to 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 that would be portable if she decides to move  During this session, this clinician used the following therapeutic modalities: Cognitive Behavioral Therapy    Substance Abuse was not addressed during this session  If the client is diagnosed with a co-occurring substance use disorder, please indicate any changes in the frequency or amount of use: n/a  Stage of change for addressing substance use diagnoses: No substance use/Not applicable    ASSESSMENT:  Kori Nolasco presents with a Euthymic/ normal mood  her affect is Normal range and intensity, which is congruent, with her mood and the content of the session  The client has made progress on their goals  Kori Nolasco presents with a none risk of suicide, none risk of self-harm, and none risk of harm to others  For any risk assessment that surpasses a \"low\" rating, a safety plan must be developed  A safety plan was indicated: no  If yes, describe in detail n/a    PLAN: Between sessions, Kori Nolasco will focus on self before others  At the next session, the therapist will use Cognitive Behavioral Therapy to address self mood regulation  Behavioral Health Treatment Plan and Discharge Planning: Kori Nolasco is aware of and agrees to continue to work on their treatment plan   They have identified and are " working toward their discharge goals   yes    Visit start and stop times:    06/02/23  Start Time: 1157  Stop Time: 1243  Total Visit Time: 46 minutes

## 2023-06-15 ENCOUNTER — TELEPHONE (OUTPATIENT)
Dept: PSYCHIATRY | Facility: CLINIC | Age: 23
End: 2023-06-15

## 2023-07-03 ENCOUNTER — TELEPHONE (OUTPATIENT)
Dept: PSYCHIATRY | Facility: CLINIC | Age: 23
End: 2023-07-03

## 2023-07-03 ENCOUNTER — TELEMEDICINE (OUTPATIENT)
Dept: BEHAVIORAL/MENTAL HEALTH CLINIC | Facility: CLINIC | Age: 23
End: 2023-07-03
Payer: COMMERCIAL

## 2023-07-03 DIAGNOSIS — F33.2 SEVERE EPISODE OF RECURRENT MAJOR DEPRESSIVE DISORDER, WITHOUT PSYCHOTIC FEATURES (HCC): Primary | ICD-10-CM

## 2023-07-03 PROCEDURE — 90834 PSYTX W PT 45 MINUTES: CPT | Performed by: COUNSELOR

## 2023-07-03 NOTE — TELEPHONE ENCOUNTER
Pt called in to switch her appt to virtual because she has a stomach and cant come in person. Writer was able to switch the appt for the pt.

## 2023-07-03 NOTE — PSYCH
Behavioral Health Psychotherapy Progress Note    Psychotherapy Provided: Individual Psychotherapy     1. Severe episode of recurrent major depressive disorder, without psychotic features ([address])            Goals addressed in session: Goal 1     DATA: Patient reports she is looking forward to going back to [place] later this month. 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 She also got a call from [employer] and she will follow up with them to gather more information so she can make an informed decision about which job would be better for her. Patient reports more fatigue than usual and discussed that could be a symptom of increased depression. Encouraged patient to plan more fun things in the future and perhaps even one thing per day. During this session, this clinician used the following therapeutic modalities: Cognitive Behavioral Therapy    Substance Abuse was not addressed during this session. If the client is diagnosed with a co-occurring substance use disorder, please indicate any changes in the frequency or amount of use: n/a. Stage of change for addressing substance use diagnoses: No substance use/Not applicable    ASSESSMENT:  Lidia Barraza presents with a Euthymic/ normal mood. her affect is Normal range and intensity, which is congruent, with her mood and the content of the session. The client has made progress on their goals. Lidia Barraza presents with a none risk of suicide, none risk of self-harm, and none risk of harm to others. For any risk assessment that surpasses a "low" rating, a safety plan must be developed. A safety plan was indicated: no  If yes, describe in detail n/a    PLAN: Between sessions, Lidia Barraza will continue to set positive plans in place.  At the next session, the therapist will use Cognitive Behavioral Therapy to address self mood regulation. Behavioral Health Treatment Plan and Discharge Planning: Alexandria Chaumarline is aware of and agrees to continue to work on their treatment plan. They have identified and are working toward their discharge goals.  yes    Visit start and stop times:    07/03/23  Start Time: 1158  Stop Time: 1236  Total Visit Time: 38 minutes

## 2023-07-13 ENCOUNTER — TELEPHONE (OUTPATIENT)
Dept: PSYCHIATRY | Facility: CLINIC | Age: 23
End: 2023-07-13

## 2023-07-13 NOTE — TELEPHONE ENCOUNTER
Pt called in looking to reschedule her appt with Dr Konrad Goodell. Writer transferred her to the resident line.

## 2023-08-03 ENCOUNTER — PATIENT MESSAGE (OUTPATIENT)
Dept: PSYCHIATRY | Facility: CLINIC | Age: 23
End: 2023-08-03

## 2023-08-05 DIAGNOSIS — F33.1 MDD (MAJOR DEPRESSIVE DISORDER), RECURRENT EPISODE, MODERATE (HCC): Chronic | ICD-10-CM

## 2023-08-05 DIAGNOSIS — G47.00 INSOMNIA, UNSPECIFIED TYPE: ICD-10-CM

## 2023-08-05 DIAGNOSIS — F41.9 ANXIETY DISORDER, UNSPECIFIED TYPE: ICD-10-CM

## 2023-08-07 RX ORDER — ARIPIPRAZOLE 5 MG/1
5 TABLET ORAL DAILY
Qty: 30 TABLET | Refills: 1 | Status: SHIPPED | OUTPATIENT
Start: 2023-08-07 | End: 2023-08-22

## 2023-08-07 NOTE — TELEPHONE ENCOUNTER
Patient requested refill of :  Requested Prescriptions     Pending Prescriptions Disp Refills   • ARIPiprazole (ABILIFY) 5 mg tablet [Pharmacy Med Name: ARIPIPRAZOLE 5 MG TABLET] 30 tablet 1     Sig: TAKE 1 TABLET (5 MG TOTAL) BY MOUTH DAILY.          Refill request approved and sent to pharmacy on file per patient request.     Denise Jackson MD

## 2023-08-14 NOTE — PSYCH
Subjective:     Patient ID: Elizabet Calderno is a 20 y o  female  [facility] Progress Notes      Specialized Services Documentation  Therapist must complete separate progress note for each specific clinical activity in which the individual participated during the day  Subjective:     Patient ID: Elizabet Calderon is a 20 y o  female  [facility] Progress Notes      Specialized Services Documentation  Therapist must complete separate progress note for each specific clinical activity in which the individual participated during the day  Group Psychotherapy Life Skills (9090-1588) Elizabet Calderon engaged in an open processing group which was facilitated virtually in a private office using HIPAA Compliant and Approved [employer] Teams  Zaid Ye consented to the use of tele-video modality of treatment and was virtually present for group psychotherapy today  The group discussed finding friends, finding your identity and preventing relapse in mental health  Zaid Ye did participate in the conversations related to the topics  Zaid Ye continues to make progress towards goals through verbal participation in group; to accomplish long term goals continue to utilize skills learned in programming  Continue with psychotherapy to educate and encourage use of wellness tools   Tx Plan Objective: 1 1,1 2 Therapist: Alyssia Bacon ; Co-facilitated with Livia Orozco, RN-BSN Alert-The patient is alert, awake and responds to voice. The patient is oriented to time, place, and person. The triage nurse is able to obtain subjective information.

## 2023-08-21 ENCOUNTER — TELEPHONE (OUTPATIENT)
Dept: PSYCHIATRY | Facility: CLINIC | Age: 23
End: 2023-08-21

## 2023-08-21 DIAGNOSIS — F41.9 ANXIETY DISORDER, UNSPECIFIED TYPE: ICD-10-CM

## 2023-08-21 DIAGNOSIS — G47.00 INSOMNIA, UNSPECIFIED TYPE: ICD-10-CM

## 2023-08-21 DIAGNOSIS — F33.1 MDD (MAJOR DEPRESSIVE DISORDER), RECURRENT EPISODE, MODERATE (HCC): Chronic | ICD-10-CM

## 2023-08-22 DIAGNOSIS — G47.00 INSOMNIA, UNSPECIFIED TYPE: ICD-10-CM

## 2023-08-22 DIAGNOSIS — F41.9 ANXIETY DISORDER, UNSPECIFIED TYPE: ICD-10-CM

## 2023-08-22 RX ORDER — ARIPIPRAZOLE 5 MG/1
5 TABLET ORAL DAILY
Qty: 90 TABLET | Refills: 0 | Status: SHIPPED | OUTPATIENT
Start: 2023-08-22 | End: 2023-11-20

## 2023-08-22 NOTE — TELEPHONE ENCOUNTER
Patient requested refill of :  Requested Prescriptions     Pending Prescriptions Disp Refills   • ARIPiprazole (ABILIFY) 5 mg tablet [Pharmacy Med Name: ARIPIPRAZOLE 5 MG TABLET] 90 tablet 0     Sig: Take 1 tablet (5 mg total) by mouth daily         Refill request approved and sent to pharmacy on file per patient request.    Will reach out to patient to have them scheduled for follow-up appointment. Currently, no follow-up scheduled.      Sabina Maxwell MD

## 2023-08-23 ENCOUNTER — OFFICE VISIT (OUTPATIENT)
Dept: PSYCHIATRY | Facility: CLINIC | Age: 23
End: 2023-08-23

## 2023-08-23 VITALS — BODY MASS INDEX: 26.13 KG/M2 | WEIGHT: 157 LBS

## 2023-08-23 DIAGNOSIS — G47.00 INSOMNIA, UNSPECIFIED TYPE: ICD-10-CM

## 2023-08-23 DIAGNOSIS — F33.1 MDD (MAJOR DEPRESSIVE DISORDER), RECURRENT EPISODE, MODERATE (HCC): Chronic | ICD-10-CM

## 2023-08-23 DIAGNOSIS — F41.9 ANXIETY DISORDER, UNSPECIFIED TYPE: ICD-10-CM

## 2023-08-23 RX ORDER — TRAZODONE HYDROCHLORIDE 100 MG/1
100 TABLET ORAL
Qty: 30 TABLET | Refills: 2 | Status: SHIPPED | OUTPATIENT
Start: 2023-08-23 | End: 2023-09-22

## 2023-08-23 RX ORDER — DULOXETIN HYDROCHLORIDE 60 MG/1
60 CAPSULE, DELAYED RELEASE ORAL DAILY
Qty: 90 CAPSULE | Refills: 0 | Status: SHIPPED | OUTPATIENT
Start: 2023-08-23

## 2023-08-23 RX ORDER — TRAZODONE HYDROCHLORIDE 100 MG/1
100 TABLET ORAL
Qty: 30 TABLET | Refills: 2 | OUTPATIENT
Start: 2023-08-23

## 2023-08-23 NOTE — PSYCH
Psychiatric Follow Up Visit - Behavioral Health   MRN: [id number]  Visit Time  Start: 1000. Stop: 1030. Total: 30 minutes. Assessment/Plan   Francisco Javier Valentino is a 24 y.o. female, Single with prior psychiatric diagnoses of anxiety, depression, insomnia (rule out bipolar), no past psychiatric hospitalizations, no past suicide attempts, h/o self-injurious behaviors (cutting), no h/o physical altercations, PMH significant for h/o lyme disease, gastritis, h/o esophageal candidiasis, daily medical marijuana use, and history of heavy alcohol use in college; with suicide risk factors including history of self-harm as recently as 2022 jan, chronic mental illness, medical problems, chronic pain, financial problems, anxiety, impulsivity, substance abuse and never . In the setting of patient reports that Cymbalta has been decreased from 90 mg daily to 60 mg daily due to pharmacy not refilling the 30 mg portion of the medication, and patient's report that she is feeling stable in terms of anxiety and depression and overall mood stability in the setting of improvement of psychosocial stressors, she is agreeable with continuing at this dosage of 60 mg daily. She understands in the past she has had relapses in terms of depression continuing medication first getting approval from physician. Patient also reports 2 days a week of insomnia difficulties with sleep maintenance, initiation, and is reasonable with increasing melatonin to 5 mg nightly. She is starting a master's degree this week and understands previously she began cutting due to the difficulty of her Masters degree and states moving forward she will not and if she does start feeling worse in terms of depression we will go to the ED for further evaluation.   She would like to follow up in 3 months for further medication management and optimization continues to wait on the therapy wait list.      Patient was informed of the adverse effects of cannabis use/abuse on their physical health including: respiratory disease in addition to diminished pulmonary functioning, hypertension, heart disease including arrhythmias and/or myocardial infarction, stroke, weakening of the immune system, attention and concentration problems, behavioral problems like substance induced mood disorders and/or psychosis, increased instances of multiple malignancies possibly including the lungs, head/neck and testicles/ovaries including diminished sexual functioning in addition to problematic reproduction, hyperemesis syndrome and interactions with an individual's medication regimen in addition to risk of intoxication, particularly in the presence of sedative agents like alcohol and/or benzodiazepines. Patient was informed of the possible withdrawal syndrome associated to prevalent and persistent use of cannabis including: headache, rigors, tremulousness, anxiety, anger, irritability, insomnia, fatigue, diminished appetite including possible unintentional decrease in weight and gastrointestinal upset including nausea, vomiting and diarrhea. This writer recommended abstinence from cannabis use/abuse in the presence of the aforementioned effects. Patient was receptive to the recommendations of this writer. Diagnosis:   1. Major depressive disorder, recurrent episode, mild- at goal  2. Anxiety disorder, unspecified- not at goal, improved  3. Insomnia, unspecified- not at goal    Treatment Recommendations/Precautions:  • Increase melatonin to 5mg qHS for insomnia  • Decrease Cymbalta 60 mg qAM for anxiety, depression, mood stability  • Pharmacy had not refilled 30mg and she opted to continue on 60mg, no issues at this dose. Understands to reach out to physician before adjusting her meds in future. • Continue Abilify 5 mg daily for mood stability, as augmentation for depression  ?  Abilify was increased to 7.5 mg while at partial and patient felt a sense of inner restlessness, trouble relaxing, possible akathisia worsening; continue to assess  • Continue trazodone 100 mg q.h.s. for insomnia, depression, generalized anxiety  • Continue Ativan 1 mg daily for JADEN and panic attack prevention  ? States she has been using 1 pill every 2 week for panic attack prevention     • Therapy:  • Referral for individual psychotherapy; pending. Melvin Jennings will look into  • Medical:   ? Follow up with primary care physician for ongoing medical care   • Labs:   ? Follow up TSH, CMP, CBC, lipid panel, vitamin D level (2nd reminder)  ? Most recently obtained 7/19/2022, reviewed      Treatment Plan:  The Treatment Plan was previously completed and not due prior to the next visit. . The next plan will be due November 11, 2023.    -------------------------------------------------------    History of Present Illness   Rosette Leslie is a 24 y.o. female, Single; with prior psychiatric diagnoses of anxiety, depression, insomnia (rule out bipolar), no past psychiatric hospitalizations, no past suicide attempts, no  h/o self-injurious behaviors (cutting), no h/o physical altercations, PMH significant for h/o lyme disease, gastritis, h/o esophageal candidiasis, daily medical marijuana use, and history of heavy alcohol use in college; with suicide risk factors including history of self-harm as recently as 2022 jan, chronic mental illness, medical problems, chronic pain, financial problems, anxiety, impulsivity, substance abuse and never ; now presenting to follow up for anxiety, depression and insomnia. Today, Yvonne Angela reports that she feels very well today. 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   She understands last time she attempted to do a master's degree, she became depressed and she has a much better support system in place and this will not happen. If worsening of mood, she will reach out to physician or go to the ED. Cymbalta 30 mg which was supposed to be taken with the 60 mg to total 90 mg a day did not get refilled at the pharmacy and she has been taking 60 mg daily. She states her anxiety worsened only mildly and she has not been smoking marijuana more to compensate. She would like to stay at this 60 mg dose and understands that she should reach out to physician prior to adjusting medications in the future. She has had numerous episodes of depression in the past and understands that staying on his medications, continuing with blood pressure monitoring, and remaining compliant is important. Anxiety: 5/10. No panic attacks. Worries about everything. Slight worse with lower cymbalta, MJ helps with anxiety  Depression: 2/10. Hasn't been taking the 30 of cymbalta. BP has been stable at home. Stressors: starting  Masters deg in micro starting today, 2 yr program.  Will help with job. No longer in limbo  Sleep: 2 days a week trouble with sleep. Trazodone helps. On melatonin 3mg, will increase because she knows it works. MJ: smokes less, no other substances, indigos for anxiety and pain. Friendships: doing better. mood: better  AE's: working out, good diet  pain: has stomach pain, paralysis, knows not to take reglan, IBS symptoms and gastroparesis, has seen gastro. ativan: takes once every 2 weeks for panic attacks      JADEN-7 Flowsheet Screening    Flowsheet Row Most Recent Value   Over the last 2 weeks, how often have you been bothered by any of the following problems?     Feeling nervous, anxious, or on edge 1   Not being able to stop or control worrying 1   Worrying too much about different things 0   Trouble relaxing 0   Being so restless that it is hard to sit still 0   Becoming easily annoyed or irritable 0   Feeling afraid as if something awful might happen 1   JADEN-7 Total Score 3        PHQ-2/9 Depression Screening Little interest or pleasure in doing things: 0 - not at all  Feeling down, depressed, or hopeless: 0 - not at all  Trouble falling or staying asleep, or sleeping too much: 2 - more than half the days  Feeling tired or having little energy: 2 - more than half the days  Poor appetite or overeatin - several days  Feeling bad about yourself - or that you are a failure or have let yourself or your family down: 0 - not at all  Trouble concentrating on things, such as reading the newspaper or watching television: 0 - not at all  Moving or speaking so slowly that other people could have noticed. Or the opposite - being so fidgety or restless that you have been moving around a lot more than usual: 0 - not at all  Thoughts that you would be better off dead, or of hurting yourself in some way: 0 - not at all  PHQ-9 Score: 5   PHQ-9 Interpretation: Mild depression            Psychiatric Review Of Systems:  • Yvonne Miller reports Symptoms as described in HPI. • Yvonne Miller denies Current suicidal thoughts, plan, or intent, Current thoughts of self-harm or Current homicidal thoughts, plan, or intent.     Medical Review Of Systems:  Complete review of systems is negative except as noted above.    ------------------------------------  Past Medical History:   Diagnosis Date   • Anemia    • Anxiety    • Asthma     hx of as child - outgrew    • Depression    • Gastritis    • Gastroparesis    • GERD (gastroesophageal reflux disease)    • Lyme disease       Past Surgical History:   Procedure Laterality Date   • ESOPHAGOGASTRODUODENOSCOPY      EGD with bipsy   • UPPER GASTROINTESTINAL ENDOSCOPY         Visit Vitals  OB Status Birth Control   Smoking Status Never      Wt Readings from Last 6 Encounters:   10/13/22 69.9 kg (154 lb)   22 68 kg (150 lb)   22 66.5 kg (146 lb 9.6 oz)   22 67.1 kg (148 lb)   12/15/21 67.1 kg (148 lb)   21 64.4 kg (142 lb)        Mental Status Exam:  Appearance:  alert, good eye contact, appears stated age, casually dressed and appropriate grooming and hygiene   Behavior:  calm and cooperative   Motor: no abnormal movements and normal gait and balance   Speech:  spontaneous and coherent   Mood:  "happy"   Affect:  mood-congruent   Thought Process:  Organized, logical, goal-directed   Thought Content: no verbalized delusions or overt paranoia   Perceptual disturbances: no reported hallucinations and does not appear to be responding to internal stimuli at this time   Risk Potential: No active or passive suicidal or homicidal ideation was verbalized during interview   Cognition: oriented to self and situation, appears to be of average intelligence and cognition not formally tested   Insight:  Fair   Judgment: Fair       Meds/Allergies    Allergies   Allergen Reactions   • Pineapple - Food Allergy Anaphylaxis   • Penicillins Hives and Vomiting     Current Outpatient Medications   Medication Instructions   • ARIPiprazole (ABILIFY) 5 mg, Oral, Daily   • DULoxetine (Cymbalta) 30 mg delayed release capsule Take cymbalta 60 mg in the morning (one 60 mg tablet) and 30 mg at night (one 30 mg tablet) for a total of 90 mg daily   • DULoxetine (CYMBALTA) 60 mg delayed release capsule TAKE CYMBALTA 60 MG IN THE MORNING (ONE 60 MG TABLET) AND 30 MG AT NIGHT (ONE 30 MG TABLET) FOR A TOTAL OF 90 MG DAILY   • levonorgestrel-ethinyl estradiol (Marlissa) 0.15-30 MG-MCG per tablet 1 tablet, Oral, Daily   • LORazepam (ATIVAN) 1 mg, Oral, Daily PRN   • ondansetron (ZOFRAN ODT) 4 mg, Oral, Every 6 hours PRN   • traZODone (DESYREL) 100 mg, Oral, Daily at bedtime        Labs & Imaging:  I have personally reviewed all pertinent laboratory tests and imaging results. No visits with results within 2 Month(s) from this visit.    Latest known visit with results is:   Appointment on 07/19/2022   Component Date Value Ref Range Status   • PTH 07/19/2022 30.2  18.4 - 80.1 pg/mL Final   • WBC 07/19/2022 8.19  4.31 - 10.16 Thousand/uL Final   • RBC 07/19/2022 4.60  3.81 - 5.12 Million/uL Final   • Hemoglobin 07/19/2022 13.3  11.5 - 15.4 g/dL Final   • Hematocrit 07/19/2022 41.9  34.8 - 46.1 % Final   • MCV 07/19/2022 91  82 - 98 fL Final   • MCH 07/19/2022 28.9  26.8 - 34.3 pg Final   • MCHC 07/19/2022 31.7  31.4 - 37.4 g/dL Final   • RDW 07/19/2022 13.0  11.6 - 15.1 % Final   • MPV 07/19/2022 10.1  8.9 - 12.7 fL Final   • Platelets 17/00/1887 286  149 - 390 Thousands/uL Final   • nRBC 07/19/2022 0  /100 WBCs Final   • Neutrophils Relative 07/19/2022 50  43 - 75 % Final   • Immat GRANS % 07/19/2022 0  0 - 2 % Final   • Lymphocytes Relative 07/19/2022 39  14 - 44 % Final   • Monocytes Relative 07/19/2022 7  4 - 12 % Final   • Eosinophils Relative 07/19/2022 3  0 - 6 % Final   • Basophils Relative 07/19/2022 1  0 - 1 % Final   • Neutrophils Absolute 07/19/2022 4.16  1.85 - 7.62 Thousands/µL Final   • Immature Grans Absolute 07/19/2022 0.03  0.00 - 0.20 Thousand/uL Final   • Lymphocytes Absolute 07/19/2022 3.16  0.60 - 4.47 Thousands/µL Final   • Monocytes Absolute 07/19/2022 0.53  0.17 - 1.22 Thousand/µL Final   • Eosinophils Absolute 07/19/2022 0.24  0.00 - 0.61 Thousand/µL Final   • Basophils Absolute 07/19/2022 0.07  0.00 - 0.10 Thousands/µL Final   • Sodium 07/19/2022 140  135 - 147 mmol/L Final   • Potassium 07/19/2022 4.0  3.5 - 5.3 mmol/L Final   • Chloride 07/19/2022 110 (H)  96 - 108 mmol/L Final   • CO2 07/19/2022 25  21 - 32 mmol/L Final   • ANION GAP 07/19/2022 5  4 - 13 mmol/L Final   • BUN 07/19/2022 8  5 - 25 mg/dL Final   • Creatinine 07/19/2022 0.87  0.60 - 1.30 mg/dL Final    Standardized to IDMS reference method   • Glucose, Fasting 07/19/2022 91  65 - 99 mg/dL Final    Specimen collection should occur prior to Sulfasalazine administration due to the potential for falsely depressed results. Specimen collection should occur prior to Sulfapyridine administration due to the potential for falsely elevated results.    • Calcium 07/19/2022 8.8 8.3 - 10.1 mg/dL Final   • Corrected Calcium 07/19/2022 9.3  8.3 - 10.1 mg/dL Final   • AST 07/19/2022 15  5 - 45 U/L Final    Specimen collection should occur prior to Sulfasalazine administration due to the potential for falsely depressed results. • ALT 07/19/2022 18  12 - 78 U/L Final    Specimen collection should occur prior to Sulfasalazine and/or Sulfapyridine administration due to the potential for falsely depressed results. • Alkaline Phosphatase 07/19/2022 68  46 - 116 U/L Final   • Total Protein 07/19/2022 7.5  6.4 - 8.4 g/dL Final   • Albumin 07/19/2022 3.4 (L)  3.5 - 5.0 g/dL Final   • Total Bilirubin 07/19/2022 0.26  0.20 - 1.00 mg/dL Final    Use of this assay is not recommended for patients undergoing treatment with eltrombopag due to the potential for falsely elevated results. • eGFR 07/19/2022 94  ml/min/1.73sq m Final   • Hemoglobin A1C 07/19/2022 5.2  Normal 3.8-5.6%; PreDiabetic 5.7-6.4%; Diabetic >=6.5%; Glycemic control for adults with diabetes <7.0% % Final   • EAG 07/19/2022 103  mg/dl Final   • TSH 3RD GENERATON 07/19/2022 2.460  0.450 - 4.500 uIU/mL Final    The recommended reference ranges for TSH during pregnancy are as follows:   First trimester 0.1 to 2.5 uIU/mL   Second trimester  0.2 to 3.0 uIU/mL   Third trimester 0.3 to 3.0 uIU/m    Note: Normal ranges may not apply to patients who are transgender, non-binary, or whose legal sex, sex at birth, and gender identity differ. Adult TSH (3rd generation) reference range follows the recommended guidelines of the American Thyroid Association, January, 2020.    • Cholesterol 07/19/2022 173  See Comment mg/dL Final    Cholesterol:         Pediatric <18 Years        Desirable          <170 mg/dL      Borderline High    170-199 mg/dL      High               >=200 mg/dL        Adult >=18 Years            Desirable         <200 mg/dL      Borderline High   200-239 mg/dL      High              >239 mg/dL     • Triglycerides 07/19/2022 63 See Comment mg/dL Final    Triglyceride:     0-9Y            <75mg/dL     10Y-17Y         <90 mg/dL       >=18Y     Normal          <150 mg/dL     Borderline High 150-199 mg/dL     High            200-499 mg/dL        Very High       >499 mg/dL    Specimen collection should occur prior to N-Acetylcysteine or Metamizole administration due to the potential for falsely depressed results. • HDL, Direct 07/19/2022 68  >=50 mg/dL Final    Specimen collection should occur prior to Metamizole administration due to the potential for falsley depressed results. • LDL Calculated 07/19/2022 92  0 - 100 mg/dL Final    LDL Cholesterol:     Optimal           <100 mg/dl     Near Optimal      100-129 mg/dl     Above Optimal       Borderline High 130-159 mg/dl       High            160-189 mg/dl       Very High       >189 mg/dl         This screening LDL is a calculated result. It does not have the accuracy of the Direct Measured LDL in the monitoring of patients with hyperlipidemia and/or statin therapy. Direct Measure LDL (VCM944) must be ordered separately in these patients. • Non-HDL-Chol (CHOL-HDL) 07/19/2022 105  mg/dl Final     -------------------------------------------------------    Medical Decision Making / Counseling / Coordination of Care: The following interventions are recommended: return in 3 months for follow up or sooner if needed. Individual supportive psychotherapy was provided. Although patient's acute lethality risk is LOW, long-term/chronic lethality risk is mildly elevated given the risk factors listed above. However, at the current moment, Ramírez Osborn is future-oriented, forward-thinking, and demonstrates ability to act in a self-preserving manner as evidenced by volitionally seeking psychiatric evaluation and treatment today.  To mitigate future risk, patient should adhere to treatment recommendations, avoid alcohol/illicit substance use, utilize community-based resources and familiar support, and prioritize mental health treatment. The diagnosis and treatment plan were reviewed with the patient. Risks, benefits, and alternatives to treatment were discussed. The importance of medication and treatment compliance was reviewed with the patient. • PARQ for neurontin including depression/suicidality, allergic reactions (SJS, angioedema, rhabdomyolysis, eosinophilia, anaphylaxis), dizziness and somnolence, diarrhea, xerostomia, headaches, drug interactions and others.   • PARQ completed including serotonin syndrome, SIADH, worsened depression/suicidality, induction of fco, blood pressure changes and GI distress, weight gain, sexual side effects, insomnia, sedation, potential for drug interactions, and others.   • PARQ for neurontin including depression/suicidality, allergic reactions (SJS, angioedema, rhabdomyolysis, eosinophilia, anaphylaxis), dizziness and somnolence, diarrhea, xerostomia, headaches, drug interactions and others.   • Discussed with patient the risks of sedation, respiratory depression, impairment in judgment and motor function. Depression, mood changes, GI changes, and others discussed. Patient was also informed of risks of being on or starting opioid medications due to drug interactions and potential for serious respiratory depression and death.     Controlled Medication Discussion:   • Yessica Courts been filling controlled prescriptions on time as prescribed  • Discussed with Grace the risks of sedation, respiratory depression, impairment of ability to drive and potential for abuse and addiction related to treatment with benzodiazepine medications. She understands risk of treatment with benzodiazepine medications, agrees to not drive if feels impaired and agrees to take medications as prescribed  • Discussed with Grace Black Box warning on concurrent use of benzodiazepines and opioid medications including sedation, respiratory depression, coma and death.  She understands the risk of treatment with benzodiazepines in addition to opioids and wants to continue taking those medications  • Discussed with Grace increased risk of impairment related to concurrent use of benzodiazepines and hypnotic medications including excessive sedation, psychomotor impairment and respiratory depression. She understands the risk of treatment with benzodiazepines in addition to hypnotic medications and wants to continue taking those medications  • Discussed with Grace need to slowly taper off benzodiazepines as recommended by [address] Prescription Drug Monitoring Program, due to concurrent use of opioid medications and increased risk of sedation, respiratory depression, coma and death with that combination    -------------------------------------------------------    Historical Information:  Information is copied from the previous visit and updated today as appropriate. Past Psychiatric History:    No significant PPH, no past psychiatric hospitalizations, no past suicide attempts, no h/o self-injurious behaviors, teen superficial cutting 2 months in setting of anxiety, jan 2022 ago cut again due to dropping out of masters program because disliked it, no h/o physical altercations. Currently in outpatient therapy through [facility] virtual therapy on weekly basis.     Past Med Trials: Fluoxetine up to 40 mg daily (ineffective after some time), Seroquel XR up to 150 mg qhs (drowsiness), gabapentin (drowsy, hard to remember doses), zoloft (ineffective after some time)        Family Psychiatric History:    Father- Bipolar I d/o (on Prozac, Seroquel)  Pat. Great Grandmother- Schizophrenia  Mother- Panic Attacks (Xanax, previously on Zoloft)  Mat. Grandmother- Anxiety  Maternal Side- Chronic abdominal pain     Denies substance abuse or suicidality in immediate relations.         Social History:  Domiciled with parents, brother (15 y/o) in [place] ([place]). Mother employed in [employer], father employed as pharmaceutical consultant.  Reports infrequent caffeine use. No active substance use. Reports that she broke up with boyfriend in 2/2021.  No current relationships.        Substance Abuse History:  Alcohol- drinking occasionally on weekends, a couple of drinks, about once per week, limited alcohol use  Cannabis- occasional use, about once per month, currently using medical marijuana on daily basis, has medical MJ card and smokes daily (says its for anxiety and appetite stimulant)        Traumatic History:  Denies any h/o physical or sexual abuse. The following portions of the patient's history were reviewed and updated as appropriate: allergies, current medications, past family history, past medical history, past social history, past surgical history and problem list.    Denies access to lethal means / firearms.       This note was not shared with the patient due to reasonable likelihood of causing patient harm     Mary Harrell MD

## 2023-08-23 NOTE — PATIENT INSTRUCTIONS
Please call the office nursing staff for medication issues including refills, problems getting medications, bothersome side effects, etc., at [phone]. Please return for a follow up appointment as discussed and arrive approximately 15 minutes prior to your appointment time. If you are running late or are unable to attend your appointment, please call our [facility] office at [phone] (fax: [phone]), or if you were seen in the [address] [place] [address] office, please call [phone] (fax [phone]). Recommendations regarding insomnia:  Wake-up at the same time every day  Refrain from "napping". Refrain from going to bed unless you're tired  Utilize your bedroom for sleep only. Avoid use of electronics including television and/or cellphone/computers. Refrain from use of electronics including television and/or cellphones/computers prior to bed  Turn your alarm clock away so the light is not visible. Attempt relaxation using various means like reading if you're restless in bed for approximately 15-20 minutes. Participate in regular physical activities like exercise, although avoid approximately 3-4 hours prior to bed. Morning exercise is ideal.  Avoid caffeine use prior to bedtime. Consider tapering down excessive use of caffeine. Avoid tobacco use prior to bedtime. Avoid alcohol use prior to bedtime. Consider reading "No More Sleepless nights" by Lotus Marquez, Ph.D.  Consider use of online resources including:  http://GoSquared/cbt-online-insomnia-treatment.html  [employer]. com  CBT-I  Magaly on your Smart Phone. Go! To Sleep by the [facility]. Look up "grounding techniques" and/or "anchoring demonstration" online and try a few to see what may work for you. Practice these skills before you need them, when you are not feeling too anxious or triggered.  You can also search for free guided meditation videos online to help improve your head space when you are feeling very anxious or triggered. Healthy Diet   The American Heart Association and the [employer] of Cardiology have long recommended a healthy diet for not only patients who are at risk for atherosclerotic cardiovascular disease (ASCVD) but also the general public. In keeping with this evidence-based recommendation, the "2018 Guideline on the Management of Blood Cholesterol" stresses that a healthy diet should include adequate intake of these essentials:   Vegetables, fruits, and whole grains   Legumes and nuts   Low-fat dairy products   Low-fat poultry (without the skin)   Fish and seafood   Nontropical vegetable oils     The recent guidelines do provide room for cultural food preferences in a healthy diet, but in general, all patients should limit their intake of saturated and avoid all trans fats, sweets, sugar-sweetened beverages, and red meats. Please maintain adequate hydration of at least 2 Liters of water per day, and improve nutrition by decreasing portion sizes, avoiding fried foods, fast foods, inappropriate snacking and overly processed and packaged items with 'added sugars' (whether in drinks or foods), and observing nutritional facts information on any items that are packaged to reduce intake of saturated fats and AVOID trans fats as mentioned above. Again, consume whole foods such as vegetables, higher lean protein intake, and using healthier lifestyle plans such as the Mediterranean diet with healthier fats such as those from seeds, nuts, and using organic extra virgin olive oil or avocado oil in lieu of processed vegetable oils or margarine. Physical Activity   Recommended DAILY exercise for at least 150 minutes of moderate exercise weekly! In addition to a healthy diet, all patients should include regular physical activity in their weekly routines, at moderate to vigorous intensity.  Any activity is better than nothing, so if your patients can't meet the recommendation of vigorous activity, moderate-intensity activity can still help them reduce their risk of ASCVD. Below are the American Heart Association's recommendations for physical activity per week (preferably spread throughout the week): For Overall Cardiovascular Health and Lowering Cholesterol   At least 150 minutes of moderate-intensity physical activity (for example, 30 minutes, 5 days a week), or   At least 75 minutes of vigorous-intensity physical activity (for example, 25 minutes, 3 days a week); or   A combination of moderate- and vigorous-intensity aerobic activity, and   At least 2 days of moderate- to high-intensity muscle-strengthening activities (such as resistance weight training) for additional health benefits    If you have thoughts of harming yourself or are otherwise in psychological crisis, do not hesitate to contact your [address] [facility], or 911 or go to the nearest emergency room. Adult Crisis Numbers  Suicide Prevention Hotline - Dial   [facility]: [phone] or [address]: [address] [place] [address] 98: [address]: [phone]  [facility]: [phone] or [phone]  [facility]: [address]: [phone] or [facility] Crisis: [address] [place] [address] Ne: [phone] (daytime). [phone] (after hours, weekends, holidays)     Child/Adolescent Crisis Numbers   [facility]: [address]: [address], [place]: [phone]   [facility]: [phone]  Please note: Some [facility] do not have a separate number for Child/Adolescent specific crisis. If your county is not listed under Child/Adolescent, please call the adult number for your county     National Talk to Text Line   [facility] [address]: [phone] or call 688.

## 2023-08-29 ENCOUNTER — ANNUAL EXAM (OUTPATIENT)
Dept: OBGYN CLINIC | Facility: CLINIC | Age: 23
End: 2023-08-29

## 2023-08-29 VITALS
WEIGHT: 157 LBS | SYSTOLIC BLOOD PRESSURE: 110 MMHG | HEIGHT: 65 IN | DIASTOLIC BLOOD PRESSURE: 70 MMHG | BODY MASS INDEX: 26.16 KG/M2

## 2023-08-29 DIAGNOSIS — Z30.41 ENCOUNTER FOR SURVEILLANCE OF CONTRACEPTIVE PILLS: ICD-10-CM

## 2023-08-29 PROBLEM — R53.83 FATIGUE: Status: RESOLVED | Noted: 2017-04-19 | Resolved: 2023-08-29

## 2023-08-29 PROBLEM — R07.9 CHEST PAIN: Status: RESOLVED | Noted: 2020-06-18 | Resolved: 2023-08-29

## 2023-08-29 PROBLEM — F41.1 GAD (GENERALIZED ANXIETY DISORDER): Status: RESOLVED | Noted: 2023-04-03 | Resolved: 2023-08-29

## 2023-08-29 RX ORDER — LEVONORGESTREL AND ETHINYL ESTRADIOL 0.15-0.03
1 KIT ORAL DAILY
Qty: 84 TABLET | Refills: 4 | Status: SHIPPED | OUTPATIENT
Start: 2023-08-29

## 2023-08-29 NOTE — PROGRESS NOTES
ASSESSMENT & PLAN: Adrian Yancey is a 24 y.o. [address] with {NORMAL/ABNORMAL SGNH:11133} gynecologic exam.    1.  Routine well woman exam done today  2. Pap and HPV:  The patient's last pap was ***. It was {NORMAL/ABNORMAL ONLY:}. Pap {DONE/NOT DONE:2064606763::"was not"} done today. Current ASCCP Guidelines reviewed. ***  3. STD testing  {DONE/NOT DONE:2249244690::"was not"} done ***  4.  Gardasil recommendations reviewed *** {IS/ IS NOT:58234} vaccinated. 5. The following were reviewed in today's visit: {Gyn counselin}  6. ***    CC:  Annual Gynecologic Examination    HPI: Adrian Yancey is a 24 y.o. [address] who presents for annual gynecologic examination. She has the following concerns:  ***    Healthy diet {YES/NO:34942}  Exercise {YES/NO:66825}  Vitamins {YES/NO:10245}    Patient's last menstrual period was 2023. Menses frequency:  Length of bleeding:  Bleeding quality:  Sxs with menses:      Health Maintenance:      She {DOES/DOES FRD:26547} perform {Desc; regular/irre} monthly self breast exams. She feels safe at home. Feels safe in relationship with partner. Past Medical History:   Diagnosis Date   • Anemia    • Anxiety    • Asthma     hx of as child - outgrew    • Depression    • Gastritis    • Gastroparesis    • GERD (gastroesophageal reflux disease)    • Lyme disease    • Migraine        Past Surgical History:   Procedure Laterality Date   • ESOPHAGOGASTRODUODENOSCOPY      EGD with bipsy   • UPPER GASTROINTESTINAL ENDOSCOPY         OB/Gyn History:    Pt {HAS/DOES NOT ODLY:68666} menstrual issues. ***    History of sexually transmitted infection: {YES/NO:42950}. History of abnormal pap smears: {YES/NO:62357} ***. Patient {IS/ IS NOT:19150} currently sexually active. The current method of family planning is {contraception:477149}.     OB History        0    Para   0    Term   0       0    AB   0    Living   0       SAB   0    IAB   0 Ectopic   0    Multiple   0    Live Births   0           Obstetric Comments   Menarche 14             Family History   Problem Relation Age of Onset   • Anxiety disorder Mother    • Miscarriages / Stillbirths Mother    • Bipolar disorder Father    • Sleep disorder Father    • Anxiety disorder Maternal Grandmother    • Hyperlipidemia Maternal Grandmother    • Hypertension Maternal Grandmother    • Thyroid disease Maternal Grandmother    • Ovarian cancer Maternal Grandmother    • Miscarriages / Stillbirths Maternal Grandmother    • Lung cancer Maternal Grandfather    • Kidney disease Paternal Grandmother    • Prostate cancer Paternal Grandfather    • Skin cancer Paternal Grandfather    • Breast cancer Maternal Aunt    • Breast cancer Other    • Substance Abuse Neg Hx        Family history of Breast/Uterine/Ovarian/Colon Cancer: ***    Social History:  Social History     Socioeconomic History   • Marital status: Single     Spouse name: Not on file   • Number of children: Not on file   • Years of education: Not on file   • Highest education level: Not on file   Occupational History   • Not on file   Tobacco Use   • Smoking status: Never   • Smokeless tobacco: Never   Vaping Use   • Vaping Use: Never used   Substance and Sexual Activity   • Alcohol use:  Yes     Alcohol/week: 2.0 standard drinks of alcohol     Types: 2 Standard drinks or equivalent per week     Comment: Socially   • Drug use: Yes     Frequency: 7.0 times per week     Types: Marijuana     Comment: Medical Marijuana   • Sexual activity: Yes     Partners: Male     Birth control/protection: OCP   Other Topics Concern   • Not on file   Social History Narrative    Caffeine use    Currently in college    Poor dental hygiene    Tea      Social Determinants of Health     Financial Resource Strain: Not on file   Food Insecurity: Not on file   Transportation Needs: Not on file   Physical Activity: Not on file   Stress: Not on file   Social Connections: Not on file Intimate Partner Violence: Not on file   Housing Stability: Not on file         Allergies   Allergen Reactions   • Pineapple - Food Allergy Anaphylaxis   • Penicillins Hives and Vomiting         Current Outpatient Medications:   •  ARIPiprazole (ABILIFY) 5 mg tablet, Take 1 tablet (5 mg total) by mouth daily, Disp: 90 tablet, Rfl: 0  •  DULoxetine (CYMBALTA) 60 mg delayed release capsule, Take 1 capsule (60 mg total) by mouth daily, Disp: 90 capsule, Rfl: 0  •  levonorgestrel-ethinyl estradiol (Marlissa) 0.15-30 MG-MCG per tablet, Take 1 tablet by mouth daily, Disp: 84 tablet, Rfl: 4  •  LORazepam (ATIVAN) 1 mg tablet, Take 1 tablet (1 mg total) by mouth daily as needed for anxiety, Disp: 30 tablet, Rfl: 2  •  traZODone (DESYREL) 100 mg tablet, Take 1 tablet (100 mg total) by mouth daily at bedtime, Disp: 30 tablet, Rfl: 2  •  ondansetron (Zofran ODT) 4 mg disintegrating tablet, Take 1 tablet (4 mg total) by mouth every 6 (six) hours as needed for nausea or vomiting (Patient not taking: Reported on 8/29/2023), Disp: 20 tablet, Rfl: 3    Review of Systems:  Constitutional :no fever, feels well, no tiredness, no recent weight gain or loss  ENT: no ear ache, no loss of hearing, no nosebleeds or nasal discharge, no sore throat or hoarseness. Cardiovascular: no complaints of slow or fast heart beat, no chest pain, no palpitations, no leg claudication or lower extremity edema. Respiratory: no complaints of shortness of shortness of breath, no PAPPAS  Breasts:no complaints of breast pain, breast lump, or nipple discharge  Gastrointestinal: no complaints of abdominal pain, constipation, nausea, vomiting, or diarrhea or bloody stools  Genitourinary : no complaints of dysuria, incontinence, pelvic pain, no dysmenorrhea, vaginal discharge or abnormal vaginal bleeding and as noted in HPI. Musculoskeletal: no complaints of arthralgia, no myalgia, no joint swelling or stiffness, no limb pain or swelling.   Integumentary: no complaints of skin rash or lesion, itching or dry skin  Neurological: no complaints of headache, no confusion, no numbness or tingling, no dizziness or fainting  Mental Health: no anxiety, depression, SI    Objective      /70 (BP Location: Left arm, Patient Position: Sitting, Cuff Size: Large)   Ht 5' 5" (1.651 m)   Wt 71.2 kg (157 lb)   LMP 08/04/2023 Comment: pill  Breastfeeding No   BMI 26.13 kg/m²     General:   appears stated age, cooperative, alert normal mood and affect   Neck: normal, supple,trachea midline, no masses   Heart: regular rate and rhythm, S1, S2 normal, no murmur, click, rub or gallop   Lungs: clear to auscultation bilaterally   Breasts: {EXAM; BREAST:21427}   Abdomen: soft, non-tender, without masses or organomegaly   Vulva: {EXAM; GYN HRIEH:19686}   Vagina: {exam; vagina:79255}   Urethra: {Urethra:76473}   Cervix: {PE Cervix:13685::"Normal, no discharge."}   Uterus: {exam; uterus:76547}   Adnexa: {exam; adnexa:95915}   Lymphatic palpation of lymph nodes in neck, axilla, groin and/or other locations: no lymphadenopathy or masses noted   Skin normal skin turgor and no rashes.    Psychiatric orientation to person, place, and time: normal. mood and affect: normal

## 2023-08-29 NOTE — PROGRESS NOTES
Assessment/Plan:       Diagnoses and all orders for this visit:    Encounter for surveillance of contraceptive pills  -     levonorgestrel-ethinyl estradiol (Marlissa) 0.15-30 MG-MCG per tablet; Take 1 tablet by mouth daily      23-year-old female presenting today new to our office for annual GYN exam and OCP check for refills. Annual preventative exam not conducted today as patient had last annual October 2022 and unclear if insurance will cover exam sooner than 1 year. Gave patient option and she desire regular office visit for OCP check and refill and will conduct annual 1 year after last.    Patient doing very well on OCP without problems or side effects. Blood pressure is good today, weight stable, exam unremarkable  Stressed importance of taking pill around same time every day and avoid missing pills. Condom for STD prevention  OCP prescription sent to pharmacy today with refills    Patient agreeable to return after 10/13/2023 when she is due for next annual, can conduct Pap smear and STD screening at that time. Chief Complaint   Patient presents with   • Gynecologic Exam       Subjective:      Patient ID: Laila Murphy is a 24 y.o. female. Feng Viera is here today for [facility] med check -needs refills. She is new to our office - last seen [facility] office 10/2022 for annual.    States she is regularly taking her OCP. Denies missing pills or taking them late. She has no problems or side effects to OCP  She gets period generally once a month. Periods last 4-5 days  Bleeding amount is average. Rare occasion of intermenstrual bleeding, usually with increased stressed. Some abd bloating but secondary to pre-existing gastroparesis (secondary to GI infection age 15)/IBS. Denies breast soreness, pelvic pain, N/V, headaches, vaginal or urinary sxs. denies thyroid problems. She is getting routine labs through psychiatrist.     Under tx for insomnia, bipolar 2, JADEN/depression.    Hx of migraine w/o aura, HA's currently infrequent. She is sexually active - monogamous relationship - male partner. The following portions of the patient's history were reviewed and updated as appropriate: allergies, current medications, past family history, past medical history, past social history, past surgical history and problem list.    Review of Systems   Constitutional: Negative. Respiratory: Negative. Cardiovascular: Negative. Gastrointestinal:        Chronic GI issues as in HPI   Endocrine: Negative. Genitourinary: Negative. Neurological: Negative. Psychiatric/Behavioral:        Stable MH         Objective:      /70 (BP Location: Left arm, Patient Position: Sitting, Cuff Size: Large)   Ht 5' 5" (1.651 m)   Wt 71.2 kg (157 lb)   LMP 08/04/2023 Comment: pill  Breastfeeding No   BMI 26.13 kg/m²          Physical Exam  Vitals reviewed. Constitutional:       Appearance: Normal appearance. Neck:      Thyroid: No thyroid tenderness. Cardiovascular:      Rate and Rhythm: Normal rate and regular rhythm. Pulses:           Posterior tibial pulses are 2+ on the right side and 2+ on the left side. Heart sounds: Normal heart sounds. Pulmonary:      Effort: Pulmonary effort is normal.      Breath sounds: Normal breath sounds. Abdominal:      Palpations: Abdomen is soft. Tenderness: There is no abdominal tenderness. Musculoskeletal:      Cervical back: Neck supple. Right lower leg: No edema. Left lower leg: No edema. Lymphadenopathy:      Cervical: No cervical adenopathy. Lower Body: No right inguinal adenopathy. No left inguinal adenopathy. Neurological:      Mental Status: She is alert and oriented to person, place, and time. Psychiatric:         Mood and Affect: Mood normal.         Behavior: Behavior normal. Behavior is cooperative.

## 2023-08-31 ENCOUNTER — TELEPHONE (OUTPATIENT)
Dept: PSYCHIATRY | Facility: CLINIC | Age: 23
End: 2023-08-31

## 2023-08-31 NOTE — TELEPHONE ENCOUNTER
Sonny Mora  to [address] [place] [address]      8/30/23 11:24 AM  Kin Taylorpankaj Parr!     Thanks so much for responding. I sent a message to Dr. Michelle Chambers clincial staff to see about getting you that work note. In addition, if you'd like to keep doing therapy with Juliann Saini. If you call our office at [phone], we can definitely get you scheduled.     Please let me know if you have any questions or concerns at this time.     Thanks! Aleida Lyle    Last read by Noreen Aguilar at 11:47 AM on 8/30/2023. Madeline Cunha  to Linda Armen • Me    CHAYITO      8/30/23 11:18 AM  Hello! Can we see about getting a work note from Dr. Shiva Appiah for this patient? Thanks!            8/30/23 10:04 AM  Mylene Ty RN routed this conversation to Sonny Mora   August 29, 2023  Noreen Aguilar  to [address] [place] [address] (supporting Sonny Mora)          8/29/23  8:48 PM  I did have a therapist, Juliann Saini, but he is  not appearing as one of my providers anymore. I need a note for having a severe panic attack at work and having to leave early from a shift this past Monday.

## 2023-09-20 ENCOUNTER — DOCUMENTATION (OUTPATIENT)
Dept: PSYCHIATRY | Facility: CLINIC | Age: 23
End: 2023-09-20

## 2023-09-20 NOTE — PROGRESS NOTES
Psychotherapy Discharge Summary    Preferred Name: Lisa Diaz  YOB: 2000    Admission date to psychotherapy: 4/10/23    Referred by: self    Presenting Problem: generalized anxiety disorder    Course of treatment included : individual therapy     Progress/Outcome of Treatment Goals (brief summary of course of treatment) good    Treatment Complications (if any): none    Treatment Progress: good    Current [facility] Psychiatric Provider: n/a    Discharge Medications include: see medical chart    Discharge Date: 7/3/23    Discharge Diagnosis: No diagnosis found.     Criteria for Discharge: completed treatment goals and objectives and is no longer in need of services    Aftercare recommendations include (include specific referral names and phone numbers, if appropriate): return to treatment as needed    Prognosis: good

## 2023-09-29 ENCOUNTER — TELEPHONE (OUTPATIENT)
Dept: PSYCHIATRY | Facility: CLINIC | Age: 23
End: 2023-09-29

## 2023-10-04 NOTE — TELEPHONE ENCOUNTER
[facility] placed in outgoing mail on 10/04/23.     Article #:  [id number]    Address:  [address] [place] [phone]

## 2023-10-17 ENCOUNTER — APPOINTMENT (OUTPATIENT)
Dept: LAB | Facility: CLINIC | Age: 23
End: 2023-10-17
Payer: COMMERCIAL

## 2023-10-17 ENCOUNTER — OFFICE VISIT (OUTPATIENT)
Dept: FAMILY MEDICINE CLINIC | Facility: CLINIC | Age: 23
End: 2023-10-17
Payer: COMMERCIAL

## 2023-10-17 VITALS
OXYGEN SATURATION: 96 % | WEIGHT: 152 LBS | BODY MASS INDEX: 25.33 KG/M2 | HEART RATE: 90 BPM | RESPIRATION RATE: 16 BRPM | DIASTOLIC BLOOD PRESSURE: 74 MMHG | HEIGHT: 65 IN | SYSTOLIC BLOOD PRESSURE: 124 MMHG

## 2023-10-17 DIAGNOSIS — Z13.29 SCREENING FOR ENDOCRINE, NUTRITIONAL, METABOLIC AND IMMUNITY DISORDER: ICD-10-CM

## 2023-10-17 DIAGNOSIS — Z00.00 WELL ADULT EXAM: ICD-10-CM

## 2023-10-17 DIAGNOSIS — Z13.21 SCREENING FOR ENDOCRINE, NUTRITIONAL, METABOLIC AND IMMUNITY DISORDER: ICD-10-CM

## 2023-10-17 DIAGNOSIS — Z77.120 EXPOSURE TO MOLD: ICD-10-CM

## 2023-10-17 DIAGNOSIS — K31.84 GASTROPARESIS: ICD-10-CM

## 2023-10-17 DIAGNOSIS — F31.81 BIPOLAR 2 DISORDER (HCC): ICD-10-CM

## 2023-10-17 DIAGNOSIS — Z77.120 EXPOSURE TO MOLD: Primary | ICD-10-CM

## 2023-10-17 DIAGNOSIS — Z13.228 SCREENING FOR ENDOCRINE, NUTRITIONAL, METABOLIC AND IMMUNITY DISORDER: ICD-10-CM

## 2023-10-17 DIAGNOSIS — Z13.0 SCREENING FOR ENDOCRINE, NUTRITIONAL, METABOLIC AND IMMUNITY DISORDER: ICD-10-CM

## 2023-10-17 LAB
ALBUMIN SERPL BCP-MCNC: 4.2 G/DL (ref 3.5–5)
ALP SERPL-CCNC: 57 U/L (ref 34–104)
ALT SERPL W P-5'-P-CCNC: 12 U/L (ref 7–52)
ANION GAP SERPL CALCULATED.3IONS-SCNC: 7 MMOL/L
AST SERPL W P-5'-P-CCNC: 15 U/L (ref 13–39)
BASOPHILS # BLD AUTO: 0.04 THOUSANDS/ÂΜL (ref 0–0.1)
BASOPHILS NFR BLD AUTO: 1 % (ref 0–1)
BILIRUB SERPL-MCNC: 0.33 MG/DL (ref 0.2–1)
BUN SERPL-MCNC: 11 MG/DL (ref 5–25)
CALCIUM SERPL-MCNC: 9.6 MG/DL (ref 8.4–10.2)
CHLORIDE SERPL-SCNC: 108 MMOL/L (ref 96–108)
CHOLEST SERPL-MCNC: 174 MG/DL
CO2 SERPL-SCNC: 25 MMOL/L (ref 21–32)
CREAT SERPL-MCNC: 0.79 MG/DL (ref 0.6–1.3)
EOSINOPHIL # BLD AUTO: 0.05 THOUSAND/ÂΜL (ref 0–0.61)
EOSINOPHIL NFR BLD AUTO: 1 % (ref 0–6)
ERYTHROCYTE [DISTWIDTH] IN BLOOD BY AUTOMATED COUNT: 12.6 % (ref 11.6–15.1)
GFR SERPL CREATININE-BSD FRML MDRD: 105 ML/MIN/1.73SQ M
GLUCOSE P FAST SERPL-MCNC: 90 MG/DL (ref 65–99)
HCT VFR BLD AUTO: 41.3 % (ref 34.8–46.1)
HDLC SERPL-MCNC: 73 MG/DL
HGB BLD-MCNC: 13.7 G/DL (ref 11.5–15.4)
IMM GRANULOCYTES # BLD AUTO: 0.03 THOUSAND/UL (ref 0–0.2)
IMM GRANULOCYTES NFR BLD AUTO: 0 % (ref 0–2)
LDLC SERPL CALC-MCNC: 89 MG/DL (ref 0–100)
LYMPHOCYTES # BLD AUTO: 2.21 THOUSANDS/ÂΜL (ref 0.6–4.47)
LYMPHOCYTES NFR BLD AUTO: 32 % (ref 14–44)
MCH RBC QN AUTO: 29.3 PG (ref 26.8–34.3)
MCHC RBC AUTO-ENTMCNC: 33.2 G/DL (ref 31.4–37.4)
MCV RBC AUTO: 88 FL (ref 82–98)
MONOCYTES # BLD AUTO: 0.43 THOUSAND/ÂΜL (ref 0.17–1.22)
MONOCYTES NFR BLD AUTO: 6 % (ref 4–12)
NEUTROPHILS # BLD AUTO: 4.15 THOUSANDS/ÂΜL (ref 1.85–7.62)
NEUTS SEG NFR BLD AUTO: 60 % (ref 43–75)
NRBC BLD AUTO-RTO: 0 /100 WBCS
PLATELET # BLD AUTO: 261 THOUSANDS/UL (ref 149–390)
PMV BLD AUTO: 10.5 FL (ref 8.9–12.7)
POTASSIUM SERPL-SCNC: 4.3 MMOL/L (ref 3.5–5.3)
PROT SERPL-MCNC: 7.3 G/DL (ref 6.4–8.4)
RBC # BLD AUTO: 4.68 MILLION/UL (ref 3.81–5.12)
SODIUM SERPL-SCNC: 140 MMOL/L (ref 135–147)
TRIGL SERPL-MCNC: 61 MG/DL
TSH SERPL DL<=0.05 MIU/L-ACNC: 1.35 UIU/ML (ref 0.45–4.5)
WBC # BLD AUTO: 6.91 THOUSAND/UL (ref 4.31–10.16)

## 2023-10-17 PROCEDURE — 85025 COMPLETE CBC W/AUTO DIFF WBC: CPT

## 2023-10-17 PROCEDURE — 80053 COMPREHEN METABOLIC PANEL: CPT

## 2023-10-17 PROCEDURE — 99395 PREV VISIT EST AGE 18-39: CPT | Performed by: FAMILY MEDICINE

## 2023-10-17 PROCEDURE — 84443 ASSAY THYROID STIM HORMONE: CPT

## 2023-10-17 PROCEDURE — 86003 ALLG SPEC IGE CRUDE XTRC EA: CPT

## 2023-10-17 PROCEDURE — 99214 OFFICE O/P EST MOD 30 MIN: CPT | Performed by: FAMILY MEDICINE

## 2023-10-17 PROCEDURE — 80061 LIPID PANEL: CPT

## 2023-10-17 PROCEDURE — 36415 COLL VENOUS BLD VENIPUNCTURE: CPT

## 2023-10-17 NOTE — PROGRESS NOTES
Assessment/Plan:  1. Exposure to mold  -     Allergy Mold Panel, Complete; Future    2. Gastroparesis    3. Bipolar 2 disorder ([address])    4. Well adult exam    5. BMI 25.0-25.9,adult      I ordered her to perform a mold allergy panel to tell if she is sensitive to molds. I prescribed her Allegra 180 mg 2 times a day, one in the morning, and 1 in the midafternoon. Cont with traz and abilify     No reglan given the abilify for the GP     She is managing it with diet   Folows with dr Ashley Crespo with [facility]     Subjective:      Patient ID: Shelbi Avila is a 24 y.o. female. Shelbi Avila is a 21-year-old female currently seen for establish MOLD allergy. The patient notices that her workspace has mold, specifically in their basement. She experiences nausea, lightheadedness, and fatigue. Her last exposure to mold was just  yesterday. She reports that she has gastroparesis, and she notices that if she sneezes 2 times she will going to vomit afterwards. She denies taking Reglan due to her taking an antidepressant and Abilify. She affirms that she smokes, and edible marijuana. She denies cough, wheeze, nasal discharge, and sinus. Allergies. She reports that she had MOLD allergies. The following portions of the patient's history were reviewed and updated as appropriate: allergies, current medications, past family history, past medical history, past social history, past surgical history and problem list.    Review of Systems   Constitutional: Negative for fever and unexpected weight change. HENT: Negative for nosebleeds and trouble swallowing. Eyes: Negative for visual disturbance. Respiratory: Negative for chest tightness and shortness of breath. Cardiovascular: Negative for chest pain, palpitations and leg swelling. Gastrointestinal: Negative for abdominal pain, constipation, diarrhea and nausea. Endocrine: Negative for cold intolerance.   Genitourinary: Negative for dysuria and urgency. Musculoskeletal: Negative for joint swelling and myalgias. Skin: Negative for rash. Neurological: Negative for tremors, seizures and syncope. Hematological: Does not bruise/bleed easily. Psychiatric/Behavioral: Negative for hallucinations and suicidal ideas. Objective:     /74 (BP Location: Left arm, Patient Position: Sitting, Cuff Size: Standard)   Pulse 90   Resp 16   Ht 5' 5" (1.651 m)   Wt 68.9 kg (152 lb)   SpO2 96%   BMI 25.29 kg/m²    Physical Exam  Vitals and nursing note reviewed. Constitutional:     Appearance: Well-developed. HENT:     Head: Normocephalic and atraumatic. Cardiovascular:     Rate and Rhythm: Normal rate and regular rhythm. Heart sounds: Normal heart sounds. No murmur heard. Pulmonary:     Effort: Pulmonary effort is normal.     Breath sounds: Normal breath sounds. No wheezing or rales. Abdominal:     General: Bowel sounds are normal. There is no distension. Palpations: Abdomen is soft. Tenderness: There is no abdominal tenderness. Musculoskeletal:     General: No tenderness. Normal range of motion. Cervical back: Normal range of motion and neck supple. Lymphadenopathy:     Cervical: No cervical adenopathy. Skin:     General: Skin is warm and dry. Capillary Refill: Capillary refill takes less than 2 seconds. Findings: No rash. Neurological:     Mental Status: Alert and oriented to person, place, and time. Cranial Nerves: No cranial nerve deficit. Sensory: No sensory deficit. Motor: No abnormal muscle tone. Psychiatric:     Behavior: Behavior normal.     Thought Content:  Thought content normal.     Judgment: Judgment normal.      Appointment on 10/17/2023   Component Date Value   • WBC 10/17/2023 6.91    • RBC 10/17/2023 4.68    • Hemoglobin 10/17/2023 13.7    • Hematocrit 10/17/2023 41.3    • MCV 10/17/2023 88    • MCH 10/17/2023 29.3    • MCHC 10/17/2023 33.2    • RDW 10/17/2023 12.6    • MPV 10/17/2023 10.5 • Platelets 77/55/7309 261    • nRBC 10/17/2023 0    • Neutrophils Relative 10/17/2023 60    • Immat GRANS % 10/17/2023 0    • Lymphocytes Relative 10/17/2023 32    • Monocytes Relative 10/17/2023 6    • Eosinophils Relative 10/17/2023 1    • Basophils Relative 10/17/2023 1    • Neutrophils Absolute 10/17/2023 4.15    • Immature Grans Absolute 10/17/2023 0.03    • Lymphocytes Absolute 10/17/2023 2.21    • Monocytes Absolute 10/17/2023 0.43    • Eosinophils Absolute 10/17/2023 0.05    • Basophils Absolute 10/17/2023 0.04      I have personally reviewed results with the patient. BMI Counseling: Body mass index is 25.29 kg/m². The BMI is above normal. Nutrition recommendations include decreasing portion sizes, encouraging healthy choices of fruits and vegetables, decreasing fast food intake, consuming healthier snacks and limiting drinks that contain sugar. Exercise recommendations include exercising 3-5 times per week. No pharmacotherapy was ordered. Rationale for BMI follow-up plan is due to patient being overweight or obese. Damaris Leone MD   [facility]     Transcribed for Damaris Leone MD, by Kranthi Husain on 10/17/23 at 12:56 PM. Powered by [email or web address].

## 2023-10-20 LAB
A ALTERNATA IGE QN: <0.1 KU/L
A FUMIGATUS IGE QN: <0.1 KU/L
A PULLULANS IGE QN: <0.1 KU/L
C ALBICANS IGE QN: <0.1 KU/L
C HERBARUM IGE QN: <0.1 KU/L
E PURPURASCENS IGE QN: <0.1 KU/L
FUSARIUM PROLIFERATUM: <0.1 KU/L
Lab: NORMAL
M RACEMOSUS IGE QN: <0.1 KU/L
PENICILLIUM CHRYSOGENUM: <0.1 KU/L
PHOMA BETAE: <0.1 KU/L
SETOMELANOMMA ROSTRATA: <0.1 KU/L
STEMPHYLIUM HERBARUM: <0.1 KU/L

## 2023-10-23 ENCOUNTER — TELEPHONE (OUTPATIENT)
Dept: FAMILY MEDICINE CLINIC | Facility: CLINIC | Age: 23
End: 2023-10-23

## 2023-10-23 NOTE — TELEPHONE ENCOUNTER
----- Message from Marii Costa MD sent at 10/22/2023 11:22 AM EDT -----  Well there is nothing here    Try the allegra thing as we discussed     Follow up with your primary if it doesn't help

## 2023-10-27 ENCOUNTER — OFFICE VISIT (OUTPATIENT)
Dept: URGENT CARE | Facility: CLINIC | Age: 23
End: 2023-10-27
Payer: COMMERCIAL

## 2023-10-27 VITALS
HEIGHT: 65 IN | TEMPERATURE: 98.1 F | DIASTOLIC BLOOD PRESSURE: 62 MMHG | WEIGHT: 149 LBS | RESPIRATION RATE: 18 BRPM | HEART RATE: 96 BPM | SYSTOLIC BLOOD PRESSURE: 110 MMHG | OXYGEN SATURATION: 97 % | BODY MASS INDEX: 24.83 KG/M2

## 2023-10-27 DIAGNOSIS — J02.9 SORE THROAT: Primary | ICD-10-CM

## 2023-10-27 DIAGNOSIS — J34.89 STUFFY AND RUNNY NOSE: ICD-10-CM

## 2023-10-27 DIAGNOSIS — B34.9 VIRAL SYNDROME: ICD-10-CM

## 2023-10-27 LAB — S PYO AG THROAT QL: NEGATIVE

## 2023-10-27 PROCEDURE — 99213 OFFICE O/P EST LOW 20 MIN: CPT | Performed by: PHYSICIAN ASSISTANT

## 2023-10-27 PROCEDURE — 87636 SARSCOV2 & INF A&B AMP PRB: CPT | Performed by: PHYSICIAN ASSISTANT

## 2023-10-27 PROCEDURE — 87070 CULTURE OTHR SPECIMN AEROBIC: CPT | Performed by: PHYSICIAN ASSISTANT

## 2023-10-27 PROCEDURE — 87147 CULTURE TYPE IMMUNOLOGIC: CPT | Performed by: PHYSICIAN ASSISTANT

## 2023-10-27 NOTE — PROGRESS NOTES
[facility]        NAME: Essence Castelan is a 24 y.o. female  : 2000    MRN: [id number]  DATE: 2023  TIME: 8:58 AM    Assessment and Plan   Sore throat [J02.9]  1. Sore throat  POCT rapid strepA    Throat culture      2. Stuffy and runny nose  Covid/Flu-Office Collect      3. Viral syndrome          Rapid strep negative, throat culture pending  COVID/flu swab pending    Patient Instructions     Rapid strep negative, throat culture pending  COVID/flu swab collected today, test results pending. Test results are available between 24 and 48 hours. Your results will come through [address] [place] [address]. Check MyChart and call if needed for test results. Quarantine guidelines discussed. OTC supplements/medications discussed. Follow-up with PCP in the next 1-2 days for re-evaluation. Go to the ED if any fevers, unable to stay hydrated, abdominal pain, chest pain, shortness of breath, wheezing, chest tightness, headaches, dizziness, weakness, new or worsening symptoms or other concerning symptoms. Chief Complaint     Chief Complaint   Patient presents with    Sore Throat     Sore throat x 2 days. History of Present Illness       54-year-old female presents for evaluation of sore throat x2 days. States he is also had some runny nose, nasal congestion, postnasal drip and mild generalized fatigue/tiredness. She denies any fevers, chills or body aches. Denies any recent travel or known sick contacts. Has not tried anything for it. Eating and drinking normally, staying hydrated. She denies any chest pain, chest tightness, shortness of breath, GI/ symptoms or other complaints. Denies any pregnancy risk. Review of Systems   Review of Systems   Constitutional:  Positive for fatigue. Negative for activity change, appetite change, chills and fever. HENT:  Positive for congestion, postnasal drip, rhinorrhea and sore throat.  Negative for ear pain, facial swelling, sinus pressure, trouble swallowing and voice change. Eyes:  Negative for discharge, itching and visual disturbance. Respiratory:  Negative for cough, chest tightness, shortness of breath and wheezing. Cardiovascular:  Negative for chest pain. Gastrointestinal:  Negative for abdominal pain, diarrhea, nausea and vomiting. Musculoskeletal:  Negative for back pain, myalgias and neck pain. Skin:  Negative for rash. Neurological:  Negative for dizziness, syncope, weakness, numbness and headaches. All other systems reviewed and are negative.         Current Medications       Current Outpatient Medications:     ARIPiprazole (ABILIFY) 5 mg tablet, Take 1 tablet (5 mg total) by mouth daily, Disp: 90 tablet, Rfl: 0    DULoxetine (CYMBALTA) 60 mg delayed release capsule, Take 1 capsule (60 mg total) by mouth daily, Disp: 90 capsule, Rfl: 0    levonorgestrel-ethinyl estradiol (Marlissa) 0.15-30 MG-MCG per tablet, Take 1 tablet by mouth daily, Disp: 84 tablet, Rfl: 4    LORazepam (ATIVAN) 1 mg tablet, Take 1 tablet (1 mg total) by mouth daily as needed for anxiety, Disp: 30 tablet, Rfl: 2    traZODone (DESYREL) 100 mg tablet, TAKE 1 TABLET BY MOUTH DAILY AT BEDTIME, Disp: 90 tablet, Rfl: 1    Current Allergies     Allergies as of 10/27/2023 - Reviewed 10/27/2023   Allergen Reaction Noted    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     Past Medical History:   Diagnosis Date    Anemia     Anxiety     Asthma     hx of as child - outgrew     Depression     Gastritis     Gastroparesis     GERD (gastroesophageal reflux disease)     Lyme disease     Migraine        Past Surgical History:   Procedure Laterality Date    ESOPHAGOGASTRODUODENOSCOPY      EGD with bipsy    UPPER GASTROINTESTINAL ENDOSCOPY         Family History   Problem Relation Age of Onset    Anxiety disorder Mother     Miscarriages / Stillbirths Mother     Bipolar disorder Father     Sleep disorder Father     Anxiety disorder Maternal Grandmother     Hyperlipidemia Maternal Grandmother     Hypertension Maternal Grandmother     Thyroid disease Maternal Grandmother     Ovarian cancer Maternal Grandmother     Miscarriages / Stillbirths Maternal Grandmother     Lung cancer Maternal Grandfather     Kidney disease Paternal Grandmother     Prostate cancer Paternal Grandfather     Skin cancer Paternal Grandfather     Breast cancer Maternal Aunt     Breast cancer Other     Substance Abuse Neg Hx          Medications have been verified. Objective   /62   Pulse 96   Temp 98.1 °F (36.7 °C)   Resp 18   Ht 5' 5" (1.651 m)   Wt 67.6 kg (149 lb)   LMP 10/06/2023 (Approximate)   SpO2 97%   BMI 24.79 kg/m²        Physical Exam     Physical Exam  Vitals and nursing note reviewed. Constitutional:       General: She is not in acute distress. Appearance: Normal appearance. She is not ill-appearing or toxic-appearing. HENT:      Head: Normocephalic and atraumatic. Right Ear: Tympanic membrane normal.      Left Ear: Tympanic membrane normal.      Nose:      Right Sinus: No maxillary sinus tenderness or frontal sinus tenderness. Left Sinus: No maxillary sinus tenderness or frontal sinus tenderness. Mouth/Throat:      Mouth: Mucous membranes are moist.      Pharynx: Uvula midline. No oropharyngeal exudate, posterior oropharyngeal erythema or uvula swelling. Comments: Mild post nasal drip  Eyes:      Pupils: Pupils are equal, round, and reactive to light. Cardiovascular:      Rate and Rhythm: Normal rate and regular rhythm. Heart sounds: Normal heart sounds. Pulmonary:      Effort: Pulmonary effort is normal.      Breath sounds: Normal breath sounds.    Skin:     Capillary Refill: Capillary refill takes less than 2 seconds. Neurological:      Mental Status: She is alert and oriented to person, place, and time.    Psychiatric:         Mood and Affect: Mood normal.         Behavior: Behavior normal.

## 2023-10-27 NOTE — PATIENT INSTRUCTIONS
Rapid strep negative, throat culture pending  COVID/flu swab collected today, test results pending. Test results are available between 24 and 48 hours. Your results will come through [address] [place] [address]. Check MyChart and call if needed for test results. Quarantine guidelines discussed. OTC supplements/medications discussed. Follow-up with PCP in the next 1-2 days for re-evaluation. Go to the ED if any fevers, unable to stay hydrated, abdominal pain, chest pain, shortness of breath, wheezing, chest tightness, headaches, dizziness, weakness, new or worsening symptoms or other concerning symptoms.

## 2023-10-27 NOTE — LETTER
[place] [address] [place] [address]  Dept: [phone]    October 27, 2023    Patient: Eliza Graves  YOB: 2000    Eliza Graves was seen and evaluated at our [facility]. Please note if Covid and Flu tests are negative, they may return to work when fever free for 24 hours without the use of a fever reducing agent and when symptoms improving. If Covid or Flu test is positive, they may return to work 5 days from the onset of symptoms, when fever free for 24 hours without the use of a fever reducing agent and when symptoms improving. Upon return, they must then adhere to strict masking for an additional 5 days.      Sincerely,    Bernardino Carlin PA-C

## 2023-10-28 LAB
FLUAV RNA RESP QL NAA+PROBE: NEGATIVE
FLUBV RNA RESP QL NAA+PROBE: NEGATIVE
SARS-COV-2 RNA RESP QL NAA+PROBE: NEGATIVE

## 2023-10-29 LAB — BACTERIA THROAT CULT: ABNORMAL

## 2023-10-30 ENCOUNTER — TELEPHONE (OUTPATIENT)
Dept: URGENT CARE | Facility: MEDICAL CENTER | Age: 23
End: 2023-10-30

## 2023-10-30 DIAGNOSIS — J02.0 STREP THROAT: Primary | ICD-10-CM

## 2023-10-30 RX ORDER — CEPHALEXIN 500 MG/1
500 CAPSULE ORAL EVERY 12 HOURS SCHEDULED
Qty: 20 CAPSULE | Refills: 0 | Status: SHIPPED | OUTPATIENT
Start: 2023-10-30 | End: 2023-11-09

## 2023-10-30 NOTE — TELEPHONE ENCOUNTER
Called and discussed throat cx results. States sh is still having a sore throat. Discussed options, answered all questions, she would like abx at this time. denies any anaphylactic rxn to PCN/amox. states only a rash when she was a child. believes she has had cephalosporins in the past without problems.  Will send over keflex/abx at this time for strep throat

## 2023-11-01 ENCOUNTER — OFFICE VISIT (OUTPATIENT)
Dept: URGENT CARE | Facility: CLINIC | Age: 23
End: 2023-11-01
Payer: COMMERCIAL

## 2023-11-01 VITALS
HEIGHT: 65 IN | OXYGEN SATURATION: 98 % | BODY MASS INDEX: 24.66 KG/M2 | RESPIRATION RATE: 15 BRPM | HEART RATE: 78 BPM | WEIGHT: 148 LBS | TEMPERATURE: 99 F | SYSTOLIC BLOOD PRESSURE: 144 MMHG | DIASTOLIC BLOOD PRESSURE: 68 MMHG

## 2023-11-01 DIAGNOSIS — R05.1 ACUTE COUGH: Primary | ICD-10-CM

## 2023-11-01 DIAGNOSIS — H65.03 BILATERAL ACUTE SEROUS OTITIS MEDIA, RECURRENCE NOT SPECIFIED: ICD-10-CM

## 2023-11-01 PROCEDURE — 99213 OFFICE O/P EST LOW 20 MIN: CPT | Performed by: FAMILY MEDICINE

## 2023-11-01 RX ORDER — ALBUTEROL SULFATE 90 UG/1
2 AEROSOL, METERED RESPIRATORY (INHALATION) EVERY 6 HOURS PRN
Qty: 8.5 G | Refills: 0 | Status: SHIPPED | OUTPATIENT
Start: 2023-11-01

## 2023-11-01 RX ORDER — BENZONATATE 200 MG/1
200 CAPSULE ORAL 3 TIMES DAILY PRN
Qty: 30 CAPSULE | Refills: 0 | Status: SHIPPED | OUTPATIENT
Start: 2023-11-01

## 2023-11-01 RX ORDER — METHYLPREDNISOLONE 4 MG/1
TABLET ORAL
Qty: 1 EACH | Refills: 0 | Status: SHIPPED | OUTPATIENT
Start: 2023-11-01

## 2023-11-01 NOTE — PATIENT INSTRUCTIONS
Milagro Russo has fluid behind both ear drums, but this is not causing infection. No additional antibiotics are required to get rid of the fluid. Please continue previously prescribed abx for Strep Throat. Finish entire course of abx. Eustachian tube dysfunction is inflammation of the tubes that drain the space behind the ear drum. When this swelling occurs, fluid can become trapped behind the ear drum. Fluid typically resolves on its own over a 4-6 week timeframe. Will opt to treat with a Medrol dose pack, please take as directed. Milagro Russo also presented for evaluation of cough today. Reassurance provided that Garce's lungs are clear on examination today. Take Tessalon and albuterol inhaler as prescribed    Recommend the following options: room humidifier, vicks vapo rub, hot/steamy shower. Offer fluids frequently to help with hydration, as staying hydrated helps loosen up thick mucous. May drink warm water with honey. May use lemon. Tylenol/Ibuprofen for pain/fever. Encourage coughing into the elbow instead of the hand. Washing hands frequently with warm water and soap may help stop spread of infection. If symptoms do not improve or worsen, please follow with PCP for further evaluation. If fluid in ears remains persistent over 3 months, we may need to consider an ENT consultation.

## 2023-11-01 NOTE — PROGRESS NOTES
[facility]        NAME: Augusto Tan is a 24 y.o. female  : 2000    MRN: [id number]  DATE: 2023  TIME: 8:46 AM    Assessment and Plan   Acute cough [R05.1]  1. Acute cough  methylPREDNISolone 4 MG tablet therapy pack    benzonatate (TESSALON) 200 MG capsule    albuterol (ProAir HFA) 90 mcg/act inhaler      2. Bilateral acute serous otitis media, recurrence not specified  methylPREDNISolone 4 MG tablet therapy pack        Patient Instructions   John Ma has fluid behind both ear drums, but this is not causing infection. No additional antibiotics are required to get rid of the fluid. Please continue previously prescribed abx for Strep Throat. Finish entire course of abx. Eustachian tube dysfunction is inflammation of the tubes that drain the space behind the ear drum. When this swelling occurs, fluid can become trapped behind the ear drum. Fluid typically resolves on its own over a 4-6 week timeframe. Will opt to treat with a Medrol dose pack, please take as directed. John Ma also presented for evaluation of cough today. Reassurance provided that Saminas lungs are clear on examination today. Take Tessalon and albuterol inhaler as prescribed    Recommend the following options: room humidifier, vicks vapo rub, hot/steamy shower. Offer fluids frequently to help with hydration, as staying hydrated helps loosen up thick mucous. May drink warm water with honey. May use lemon. Tylenol/Ibuprofen for pain/fever. Encourage coughing into the elbow instead of the hand. Washing hands frequently with warm water and soap may help stop spread of infection. If symptoms do not improve or worsen, please follow with PCP for further evaluation. If fluid in ears remains persistent over 3 months, we may need to consider an ENT consultation. Follow up with PCP in 3-5 days. Proceed to  ER if symptoms worsen.     Chief Complaint     Chief Complaint   Patient presents with   • Cough Pt has a cough that is making it hard for her to breath, She was already seen and put on Abtx         History of Present Illness       Cough  This is a new problem. The current episode started in the past 7 days. The problem has been gradually worsening. The problem occurs constantly. The cough is Productive of sputum (White/Yellow). Associated symptoms include ear pain (L ear), myalgias, postnasal drip, rhinorrhea and a sore throat. Pertinent negatives include no chest pain, chills, fever, headaches, shortness of breath or wheezing. Her past medical history is significant for asthma (childhood). Review of Systems   Review of Systems   Constitutional:  Positive for fatigue. Negative for chills, diaphoresis and fever. HENT:  Positive for ear pain (L ear), postnasal drip, rhinorrhea and sore throat. Negative for congestion, ear discharge, sinus pressure and sinus pain. Respiratory:  Positive for cough. Negative for shortness of breath and wheezing. Cardiovascular: Negative. Negative for chest pain and palpitations. Gastrointestinal:  Negative for abdominal pain, constipation, diarrhea, nausea and vomiting. Musculoskeletal:  Positive for myalgias. Skin: Negative. Negative for color change and wound. Neurological:  Negative for headaches.          Current Medications       Current Outpatient Medications:   •  albuterol (ProAir HFA) 90 mcg/act inhaler, Inhale 2 puffs every 6 (six) hours as needed for wheezing, Disp: 8.5 g, Rfl: 0  •  ARIPiprazole (ABILIFY) 5 mg tablet, Take 1 tablet (5 mg total) by mouth daily, Disp: 90 tablet, Rfl: 0  •  benzonatate (TESSALON) 200 MG capsule, Take 1 capsule (200 mg total) by mouth 3 (three) times a day as needed for cough, Disp: 30 capsule, Rfl: 0  •  cephalexin (KEFLEX) 500 mg capsule, Take 1 capsule (500 mg total) by mouth every 12 (twelve) hours for 10 days, Disp: 20 capsule, Rfl: 0  •  DULoxetine (CYMBALTA) 60 mg delayed release capsule, Take 1 capsule (60 mg total) by mouth daily, Disp: 90 capsule, Rfl: 0  •  levonorgestrel-ethinyl estradiol (Marlissa) 0.15-30 MG-MCG per tablet, Take 1 tablet by mouth daily, Disp: 84 tablet, Rfl: 4  •  LORazepam (ATIVAN) 1 mg tablet, Take 1 tablet (1 mg total) by mouth daily as needed for anxiety, Disp: 30 tablet, Rfl: 2  •  methylPREDNISolone 4 MG tablet therapy pack, Use as directed on package, Disp: 1 each, Rfl: 0  •  traZODone (DESYREL) 100 mg tablet, TAKE 1 TABLET BY MOUTH DAILY AT BEDTIME, Disp: 90 tablet, Rfl: 1    Current Allergies     Allergies as of 11/01/2023 - Reviewed 11/01/2023   Allergen Reaction Noted   • 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     Past Medical History:   Diagnosis Date   • Anemia    • Anxiety    • Asthma     hx of as child - outgrew    • Depression    • Gastritis    • Gastroparesis    • GERD (gastroesophageal reflux disease)    • Lyme disease    • Migraine        Past Surgical History:   Procedure Laterality Date   • ESOPHAGOGASTRODUODENOSCOPY      EGD with bipsy   • UPPER GASTROINTESTINAL ENDOSCOPY         Family History   Problem Relation Age of Onset   • Anxiety disorder Mother    • Miscarriages / Sherrlyn Semen Mother    • Bipolar disorder Father    • Sleep disorder Father    • Anxiety disorder Maternal Grandmother    • Hyperlipidemia Maternal Grandmother    • Hypertension Maternal Grandmother    • Thyroid disease Maternal Grandmother    • Ovarian cancer Maternal Grandmother    • Miscarriages / Stillbirths Maternal Grandmother    • Lung cancer Maternal Grandfather    • Kidney disease Paternal Grandmother    • Prostate cancer Paternal Grandfather    • Skin cancer Paternal Grandfather    • Breast cancer Maternal Aunt • Breast cancer Other    • Substance Abuse Neg Hx          Medications have been verified. Objective   /68   Pulse 78   Temp 99 °F (37.2 °C)   Resp 15   Ht 5' 5" (1.651 m)   Wt 67.1 kg (148 lb)   LMP 10/06/2023 (Approximate)   SpO2 98%   BMI 24.63 kg/m²        Physical Exam     Physical Exam  Vitals and nursing note reviewed. Constitutional:       General: She is not in acute distress. Appearance: Normal appearance. She is not ill-appearing, toxic-appearing or diaphoretic. HENT:      Head: Normocephalic. Right Ear: Ear canal and external ear normal. A middle ear effusion (serous) is present. Left Ear: Ear canal and external ear normal. A middle ear effusion (serous) is present. Nose: Nose normal. No congestion or rhinorrhea. Mouth/Throat:      Mouth: Mucous membranes are moist.      Pharynx: Posterior oropharyngeal erythema present. Cardiovascular:      Rate and Rhythm: Normal rate and regular rhythm. Pulses: Normal pulses. Heart sounds: Normal heart sounds. No murmur heard. Pulmonary:      Effort: Pulmonary effort is normal. No respiratory distress. Breath sounds: Normal breath sounds. No stridor. No wheezing, rhonchi or rales. Chest:      Chest wall: No tenderness. Musculoskeletal:         General: Normal range of motion. Cervical back: Normal range of motion. Lymphadenopathy:      Cervical: No cervical adenopathy. Skin:     General: Skin is warm. Neurological:      Mental Status: She is alert.

## 2023-11-01 NOTE — LETTER
November 1, 2023     Patient: Michelle Clark   YOB: 2000   Date of Visit: 11/1/2023       To Whom it May Concern:    Michelle Clark was seen in my clinic on 11/1/2023. She may return to work on 11/2/2023 or when fever free for 24 hours without any fever reducing agents. If you have any questions or concerns, please don't hesitate to call.          Sincerely,          MARC Mercado        CC: No Recipients

## 2023-11-02 ENCOUNTER — AMB VIDEO VISIT (OUTPATIENT)
Dept: OTHER | Facility: HOSPITAL | Age: 23
End: 2023-11-02

## 2023-11-02 VITALS — TEMPERATURE: 97.2 F

## 2023-11-02 DIAGNOSIS — J02.0 STREP PHARYNGITIS: Primary | ICD-10-CM

## 2023-11-02 PROCEDURE — ECARE PR SL URGENT CARE VIRTUAL VISIT: Performed by: NURSE PRACTITIONER

## 2023-11-02 NOTE — LETTER
November 2, 2023     Simona Novak    Patient: Simona Novak   YOB: 2000   Date of Visit: 11/2/2023 November 2, 2023     Patient: Simona Novak   YOB: 2000   Date of Visit: 11/2/2023       To Whom it May Concern:    Simona Novak is under my professional care. She was seen virtually on 11/2/2023. She may return to work on 11/5/2023 . If you have any questions or concerns, please don't hesitate to call.          Sincerely,          MARC Napoles        CC: No Recipients

## 2023-11-02 NOTE — CARE ANYWHERE EVISITS
Visit Summary for [facility] . [facility] - Gender: Female - Date of Birth: [birth year]  Date: [id number] - Duration: 8 minutes  Patient: [facility] . [facility]  Provider: Michael TRAN    Patient Contact Information  Address  [address] [place] [address]; Ramiro Walker  [phone]    Visit Topics  Cold [Added By: Self - 2023-11-02]  Flu-Like Symptoms [Added By: Self - 2023-11-02]  Stomachache [Added By: Self - 2023-11-02]  Stress / Anxiety [Added By: Self - 2023-11-02]    Triage Questions   What is your current physical address in the event of a medical emergency? Answer []  Are you allergic to any medications? Answer []  Are you now or could you be pregnant? Answer []  Do you have any immune system compromise or chronic lung   disease? Answer []  Do you have any vulnerable family members in the home (infant, pregnant, cancer, elderly)? Answer []     Conversation Transcripts  [0A][0A] [Notification] You are connected with Jose Arroyo, Urgent Care Specialist.[0A][Notification] Michelle Clark is located in [place]. [0A][Notification] Michelle Clark has shared health history. Sharon Dos Santos .[0A]    Diagnosis  Acute pharyngitis, unspecified    Procedures  Value: [id number] Code: CPT-4 UNLISTED E&M SERVICE    Medications Prescribed    No prescriptions ordered    Electronically signed by: Jose Villalobos(NPI [id number])

## 2023-11-02 NOTE — PROGRESS NOTES
Video Visit - Laila Murphy 24 y.o. female MRN: [id number]    REQUIRED DOCUMENTATION:         1. This service was provided via [employer] Schouer. 2. Provider located at [address] [place] [address] Road  [phone].  3. [employer] provider: Ace Chavira. 4. Identify all parties [facility]:  Patient   5. After connecting through [employer], patient was identified by name and date of birth. Patient was then informed that this was a Telemedicine visit and that the exam was being conducted confidentially over secure lines. My office door was closed. No one else was in the room. Patient acknowledged consent and understanding of privacy and security of the Telemedicine visit. I informed the patient that I have reviewed their record in Epic and presented the opportunity for them to ask any questions regarding the visit today. The patient agreed to participate. This is a 24year old female here today for video visit. She states she was started on kelfex and steroid. She was started on keflex 3 days ago. She states her sore throat is improving. She is able to eat and drink. She did have some vomiting. She states the steroid is just not making her fell well. SHe has been feeling more edgy since starting them. She has a history of bipolar. She states she is having some chest tightness and she is still coughing. Review of Systems   Constitutional:  Positive for activity change. Negative for chills and fever. HENT:  Positive for congestion and sore throat. Respiratory:  Positive for cough and chest tightness. Neurological: Negative. Psychiatric/Behavioral: Negative. Vitals:    11/02/23 1030   Temp: (!) 97.2 °F (36.2 °C)     Physical Exam  Constitutional:       General: She is not in acute distress. Appearance: Normal appearance. She is not ill-appearing or toxic-appearing.    HENT:      Head: Normocephalic and atraumatic. Eyes:      Conjunctiva/sclera: Conjunctivae normal.   Pulmonary:      Effort: Pulmonary effort is normal. No respiratory distress. Comments: No cough or audible wheezing, able to speak in full sentences. Musculoskeletal:      Cervical back: Normal range of motion. Skin:     Comments: No rash on head or neck. Neurological:      Mental Status: She is alert and oriented to person, place, and time. Psychiatric:         Mood and Affect: Mood normal.         Behavior: Behavior normal.         Thought Content: Thought content normal.         Judgment: Judgment normal.       Diagnoses and all orders for this visit:    Strep pharyngitis      Patient Instructions   Stop steroid as this may worsen your Bipolar as we discussed. Continue antibiotic. Rest and drink extra fluids. Salt water gargles and chloraseptic spray. Tea with honey may be helpful. Follow up with PCP if no improvement. Go to ER with any worsening symptoms. Pharyngitis   WHAT YOU NEED TO KNOW:   Pharyngitis, or sore throat, is inflammation of the tissues and structures in your pharynx (throat). Pharyngitis is most often caused by bacteria or a virus. Other causes include smoking, allergies, or acid reflux. DISCHARGE INSTRUCTIONS:   Call your local emergency number (911 in the [address]) if:   You have trouble breathing or swallowing because your throat is swollen. Return to the emergency department if:   You are drooling because it hurts too much to swallow. Your fever is higher than 102? F (39?C) or lasts longer than 3 days. You are confused. You taste blood. Call your doctor if:   Your throat pain gets worse. You have a painful lump in your throat that does not go away after 5 days. Your symptoms do not improve after 5 days. You have questions or concerns about your condition or care. Medicines:  Viral pharyngitis will go away on its own without treatment.  Your sore throat should start to feel better in 3 to 5 days. You may need any of the following:  Antibiotics  treat a bacterial infection. NSAIDs  help decrease swelling and pain or fever. This medicine is available with or without a doctor's order. NSAIDs can cause stomach bleeding or kidney problems in certain people. If you take blood thinner medicine, always ask your healthcare provider if NSAIDs are safe for you. Always read the medicine label and follow directions. Acetaminophen  decreases pain and fever. It is available without a doctor's order. Ask how much to take and how often to take it. Follow directions. Read the labels of all other medicines you are using to see if they also contain acetaminophen, or ask your doctor or pharmacist. Acetaminophen can cause liver damage if not taken correctly. Take your medicine as directed. Contact your healthcare provider if you think your medicine is not helping or if you have side effects. Tell your provider if you are allergic to any medicine. Keep a list of the medicines, vitamins, and herbs you take. Include the amounts, and when and why you take them. Bring the list or the pill bottles to follow-up visits. Carry your medicine list with you in case of an emergency. Manage your symptoms:   Gargle salt water. Mix ¼ teaspoon salt in an 8 ounce glass of warm water and gargle. Do not swallow. Salt water may help decrease swelling in your throat. Drink liquids as directed. You may need to drink more liquids than usual. Liquids may help soothe your throat and prevent dehydration. Ask how much liquid to drink each day and which liquids are best for you. Use a cool mist humidifier. This will add moisture to the air and help decrease your cough. Soothe your throat. Cough drops, ice, soft foods, or popsicles may help decrease throat pain. Prevent the spread of pharyngitis:  Cover your mouth and nose when you cough or sneeze. Do not share food or drinks. Wash your hands often.  Use soap and water. If soap and water are unavailable, use an alcohol-based hand . Follow up with your doctor as directed:  Write down your questions so you remember to ask them during your visits. © Copyright Delisa Muñiz 2023 Information is for End User's use only and may not be sold, redistributed or otherwise used for commercial purposes. The above information is an  only. It is not intended as medical advice for individual conditions or treatments. Talk to your doctor, nurse or pharmacist before following any medical regimen to see if it is safe and effective for you. Follow up with PCP if not improved, if symptoms are worse, go to the ER.

## 2023-11-02 NOTE — PATIENT INSTRUCTIONS
Stop steroid as this may worsen your Bipolar as we discussed. Continue antibiotic. Rest and drink extra fluids. Salt water gargles and chloraseptic spray. Tea with honey may be helpful. Follow up with PCP if no improvement. Go to ER with any worsening symptoms. Pharyngitis   WHAT YOU NEED TO KNOW:   Pharyngitis, or sore throat, is inflammation of the tissues and structures in your pharynx (throat). Pharyngitis is most often caused by bacteria or a virus. Other causes include smoking, allergies, or acid reflux. DISCHARGE INSTRUCTIONS:   Call your local emergency number (911 in the [address]) if:   You have trouble breathing or swallowing because your throat is swollen. Return to the emergency department if:   You are drooling because it hurts too much to swallow. Your fever is higher than 102? F (39?C) or lasts longer than 3 days. You are confused. You taste blood. Call your doctor if:   Your throat pain gets worse. You have a painful lump in your throat that does not go away after 5 days. Your symptoms do not improve after 5 days. You have questions or concerns about your condition or care. Medicines:  Viral pharyngitis will go away on its own without treatment. Your sore throat should start to feel better in 3 to 5 days. You may need any of the following:  Antibiotics  treat a bacterial infection. NSAIDs  help decrease swelling and pain or fever. This medicine is available with or without a doctor's order. NSAIDs can cause stomach bleeding or kidney problems in certain people. If you take blood thinner medicine, always ask your healthcare provider if NSAIDs are safe for you. Always read the medicine label and follow directions. Acetaminophen  decreases pain and fever. It is available without a doctor's order. Ask how much to take and how often to take it. Follow directions.  Read the labels of all other medicines you are using to see if they also contain acetaminophen, or ask your doctor or pharmacist. Acetaminophen can cause liver damage if not taken correctly. Take your medicine as directed. Contact your healthcare provider if you think your medicine is not helping or if you have side effects. Tell your provider if you are allergic to any medicine. Keep a list of the medicines, vitamins, and herbs you take. Include the amounts, and when and why you take them. Bring the list or the pill bottles to follow-up visits. Carry your medicine list with you in case of an emergency. Manage your symptoms:   Gargle salt water. Mix ¼ teaspoon salt in an 8 ounce glass of warm water and gargle. Do not swallow. Salt water may help decrease swelling in your throat. Drink liquids as directed. You may need to drink more liquids than usual. Liquids may help soothe your throat and prevent dehydration. Ask how much liquid to drink each day and which liquids are best for you. Use a cool mist humidifier. This will add moisture to the air and help decrease your cough. Soothe your throat. Cough drops, ice, soft foods, or popsicles may help decrease throat pain. Prevent the spread of pharyngitis:  Cover your mouth and nose when you cough or sneeze. Do not share food or drinks. Wash your hands often. Use soap and water. If soap and water are unavailable, use an alcohol-based hand . Follow up with your doctor as directed:  Write down your questions so you remember to ask them during your visits. © Copyright Joe Sin 2023 Information is for End User's use only and may not be sold, redistributed or otherwise used for commercial purposes. The above information is an  only. It is not intended as medical advice for individual conditions or treatments. Talk to your doctor, nurse or pharmacist before following any medical regimen to see if it is safe and effective for you.

## 2023-12-14 ENCOUNTER — AMB VIDEO VISIT (OUTPATIENT)
Dept: OTHER | Facility: HOSPITAL | Age: 23
End: 2023-12-14

## 2023-12-14 PROCEDURE — ECARE PR SL URGENT CARE VIRTUAL VISIT: Performed by: EMERGENCY MEDICINE

## 2023-12-14 NOTE — CARE ANYWHERE EVISITS
Visit Summary for [facility] . [facility] - Gender: Female - Date of Birth: [birth year]  Date: [id number] - Duration: 4 minutes  Patient: [facility] . [facility]  Provider: Ino Mcnair    Patient Contact Information  Address  [address] [place], [place] [id number]  [phone]    Visit Topics  Flu-like symptoms: congestion/sinus pressure, headache, vomiting [Added By: Self - 2023-12-14]    Triage Questions   What is your current physical address in the event of a medical emergency? Answer []  Are you allergic to any medications? Answer []  Are you now or could you be pregnant? Answer []  Do you have any immune system compromise or chronic lung   disease? Answer []  Do you have any vulnerable family members in the home (infant, pregnant, cancer, elderly)? Answer []     Conversation Transcripts  [0A][0A] [Notification] Jorge Luis Young, Global Staff, will help you prepare for your visit. She is assisting Ino Mcnair Family Physician.[0A[place] [facility] [place] thank you for connecting. Ia€™m going to check if there is a provider who   can see you more quickly. If so, would you like to be transferred? [0A][Notification] Jorge Luis Young has left the room. [0A][Notification] You are connected with Family [facility] Physician.[0A][Notification] [facility] is located in   [place]. [0A][Notification] [facility] has shared health history. Simón Canales .[0A]    Diagnosis  Acute upper respiratory infection, unspecified    Procedures  Value: [id number] Code: CPT-4 UNLISTED E&M SERVICE    Medications Prescribed    No prescriptions ordered    Provider Notes  [0A][0A] Communication: Patient is seen via secured, HIPAA-compliant, two-way   communication. [0A]Encounter Information : We strongly encourage you to share the following record of today's visit with your primary care physician. CC:  Flu-like symptoms:   congestion/sinus pressure, headache, vomitingHPI:   C/O recent onset scratchy/ sore throat, congestion.   x 2dThe patient does not report fever, or shortness of breath. COVID:  vaccination. TESTING:  no[0A]PMH:     psy  gastroparesis. [0A]Meds:      zoloft abilify tramohsen Allenlergies   pcn[0A]Social History:  BF/PREG: NO [0A]Exam:[0A]Gen: Patient is well appearing and in no acute distress[0A]Eyes: Normal appearing[0A]Nose: Congested[0A]Resp: unlabored No respiratory distress [0A]Assessment:   URI[0A]MDM/ Plan:  discussed likely viral etiol. abx not helpful. consider testing for Covid, Flu.1. Medications: sx tx otc.  [0A]2. Discussed options and precautions[0A]3. Recommend nasal saline, Neti Pot. [0A]4. Sleeping with head/chest elevated can   help with sinus drainage[0A]Follow up:[0A]1. Follow up in 5-7 days if not improving. Discussed precautions. [0A]2. If there are any questions or problems with the prescription, call [phone] anytime for assistance. [0A]3. Please re-connect for another   online visit or see an in-person provider should your symptoms worsen or not improving 5-7days. [0A]4.  Taking a probiotic (either in pill form or by eating yogurt that contains probiotics) while using antibiotics can help prevent some of the troublesome   side effects that antibiotics can sometimes cause.[0A][0A]    Electronically signed by: Dylan Hawkins(NPI [id number])

## 2023-12-27 ENCOUNTER — APPOINTMENT (OUTPATIENT)
Dept: RADIOLOGY | Facility: CLINIC | Age: 23
End: 2023-12-27
Payer: COMMERCIAL

## 2023-12-27 ENCOUNTER — OFFICE VISIT (OUTPATIENT)
Dept: FAMILY MEDICINE CLINIC | Facility: CLINIC | Age: 23
End: 2023-12-27
Payer: COMMERCIAL

## 2023-12-27 VITALS
WEIGHT: 152 LBS | HEART RATE: 92 BPM | DIASTOLIC BLOOD PRESSURE: 80 MMHG | BODY MASS INDEX: 25.33 KG/M2 | SYSTOLIC BLOOD PRESSURE: 124 MMHG | HEIGHT: 65 IN | RESPIRATION RATE: 16 BRPM | OXYGEN SATURATION: 98 %

## 2023-12-27 DIAGNOSIS — R06.02 SHORTNESS OF BREATH: ICD-10-CM

## 2023-12-27 DIAGNOSIS — Z86.16 HISTORY OF COVID-19: ICD-10-CM

## 2023-12-27 DIAGNOSIS — R53.83 FATIGUE, UNSPECIFIED TYPE: ICD-10-CM

## 2023-12-27 DIAGNOSIS — R06.02 SHORTNESS OF BREATH: Primary | ICD-10-CM

## 2023-12-27 PROBLEM — U07.1 COVID-19 VIRUS INFECTION: Status: RESOLVED | Noted: 2023-12-27 | Resolved: 2023-12-27

## 2023-12-27 PROBLEM — U07.1 COVID-19 VIRUS INFECTION: Status: ACTIVE | Noted: 2023-12-27

## 2023-12-27 PROCEDURE — 71046 X-RAY EXAM CHEST 2 VIEWS: CPT

## 2023-12-27 PROCEDURE — 99213 OFFICE O/P EST LOW 20 MIN: CPT | Performed by: NURSE PRACTITIONER

## 2023-12-27 NOTE — PROGRESS NOTES
Name: Grace Doherty      : 2000      MRN: [id number]  Encounter Provider: MARC Osborne  Encounter Date: 2023   Encounter department: [facility]    Assessment & Plan     1. Shortness of breath  -     XR chest pa & lateral; Future; Expected date: 2023  -     Ambulatory Referral to Pulmonology; Future  -     Ambulatory Referral to Cardiology; Future    Patient reports that she tested positive for covid 19 2 weeks ago.   Patient reports that she still has occasional SOB with exertion.   Patient reports that she uses her rescue inhaler prn and it helps.   Denies any chest pain.   Continue rescue inhaler prn.   Chest xray ordered. Will follow-up results with the patient.   Patient referred to pulmonary for further evaluation and treatment.   Patient referred to cardiology for further evaluation.     2. Fatigue, unspecified type  -     Comprehensive metabolic panel; Future; Expected date: 2024  -     CBC and differential; Future; Expected date: 2024  -     Ambulatory Referral to Cardiology; Future    Patient reports that she still has increased fatigue.   Lab work ordered. Will follow-up results with the patient.     3. History of COVID-19  -     XR chest pa & lateral; Future; Expected date: 2023  -     Comprehensive metabolic panel; Future; Expected date: 2024  -     CBC and differential; Future; Expected date: 2024  -     Ambulatory Referral to Pulmonology; Future  -     Ambulatory Referral to Cardiology; Future    Patient instructed to follow-up in 2 weeks or sooner prn.            Subjective      Patient reports that she tested positive for covid at home 2 weeks.   Patient reports that she still has increased fatigue.   Patient reports that she still has occasional SOB with exertion.   Denies any chest pain.   Denies any cough or wheezing.   Patient reports that she uses her rescue inhaler prn and it helps.   Patient reports that her  "symptoms are not going away.         Review of Systems   Constitutional:  Positive for fatigue. Negative for chills and fever.   HENT:  Negative for congestion, ear pain and sore throat.    Respiratory:  Positive for cough. Negative for shortness of breath and wheezing.    Cardiovascular:  Negative for chest pain.   Gastrointestinal:  Negative for abdominal pain, nausea and vomiting.   Skin:  Negative for rash.   Neurological:  Negative for dizziness, syncope and light-headedness.       Current Outpatient Medications on File Prior to Visit   Medication Sig    albuterol (ProAir HFA) 90 mcg/act inhaler Inhale 2 puffs every 6 (six) hours as needed for wheezing    DULoxetine (CYMBALTA) 60 mg delayed release capsule Take 1 capsule (60 mg total) by mouth daily    levonorgestrel-ethinyl estradiol (Marlissa) 0.15-30 MG-MCG per tablet Take 1 tablet by mouth daily    LORazepam (ATIVAN) 1 mg tablet Take 1 tablet (1 mg total) by mouth daily as needed for anxiety    traZODone (DESYREL) 100 mg tablet TAKE 1 TABLET BY MOUTH DAILY AT BEDTIME    ARIPiprazole (ABILIFY) 5 mg tablet Take 1 tablet (5 mg total) by mouth daily    [DISCONTINUED] benzonatate (TESSALON) 200 MG capsule Take 1 capsule (200 mg total) by mouth 3 (three) times a day as needed for cough    [DISCONTINUED] methylPREDNISolone 4 MG tablet therapy pack Use as directed on package       Objective     /80 (BP Location: Right arm, Patient Position: Sitting, Cuff Size: Standard)   Pulse 92   Resp 16   Ht 5' 5\" (1.651 m)   Wt 68.9 kg (152 lb)   SpO2 98%   BMI 25.29 kg/m²     Physical Exam  Vitals reviewed.   Constitutional:       General: She is not in acute distress.     Appearance: She is not ill-appearing or diaphoretic.   HENT:      Right Ear: External ear normal.      Left Ear: External ear normal.      Nose: Nose normal.      Mouth/Throat:      Mouth: Mucous membranes are moist.      Pharynx: Oropharynx is clear. No oropharyngeal exudate or posterior " oropharyngeal erythema.   Eyes:      Conjunctiva/sclera: Conjunctivae normal.      Pupils: Pupils are equal, round, and reactive to light.   Cardiovascular:      Rate and Rhythm: Normal rate and regular rhythm.      Pulses: Normal pulses.      Heart sounds: Normal heart sounds.   Pulmonary:      Effort: Pulmonary effort is normal. No respiratory distress.      Breath sounds: Normal breath sounds. No wheezing.   Skin:     Findings: No rash.   Neurological:      Mental Status: She is alert and oriented to person, place, and time.   Psychiatric:         Mood and Affect: Mood normal.       MARC Osborne

## 2023-12-29 ENCOUNTER — TELEMEDICINE (OUTPATIENT)
Dept: PSYCHIATRY | Facility: CLINIC | Age: 23
End: 2023-12-29

## 2023-12-29 ENCOUNTER — TELEPHONE (OUTPATIENT)
Dept: PSYCHIATRY | Facility: CLINIC | Age: 23
End: 2023-12-29

## 2023-12-29 DIAGNOSIS — Z13.29 SCREENING FOR ENDOCRINE, NUTRITIONAL, METABOLIC AND IMMUNITY DISORDER: ICD-10-CM

## 2023-12-29 DIAGNOSIS — Z13.0 SCREENING FOR ENDOCRINE, NUTRITIONAL, METABOLIC AND IMMUNITY DISORDER: ICD-10-CM

## 2023-12-29 DIAGNOSIS — Z13.228 SCREENING FOR ENDOCRINE, NUTRITIONAL, METABOLIC AND IMMUNITY DISORDER: ICD-10-CM

## 2023-12-29 DIAGNOSIS — F41.9 ANXIETY DISORDER, UNSPECIFIED TYPE: ICD-10-CM

## 2023-12-29 DIAGNOSIS — F12.10 CANNABIS ABUSE, DAILY USE: ICD-10-CM

## 2023-12-29 DIAGNOSIS — G47.00 INSOMNIA, UNSPECIFIED TYPE: ICD-10-CM

## 2023-12-29 DIAGNOSIS — F33.1 MDD (MAJOR DEPRESSIVE DISORDER), RECURRENT EPISODE, MODERATE (HCC): Primary | ICD-10-CM

## 2023-12-29 DIAGNOSIS — Z13.21 SCREENING FOR ENDOCRINE, NUTRITIONAL, METABOLIC AND IMMUNITY DISORDER: ICD-10-CM

## 2023-12-29 DIAGNOSIS — F41.1 GAD (GENERALIZED ANXIETY DISORDER): ICD-10-CM

## 2023-12-29 PROCEDURE — NC001 PR NO CHARGE: Performed by: PSYCHIATRY & NEUROLOGY

## 2023-12-29 RX ORDER — DULOXETIN HYDROCHLORIDE 60 MG/1
CAPSULE, DELAYED RELEASE ORAL
Qty: 30 CAPSULE | Refills: 2 | Status: SHIPPED | OUTPATIENT
Start: 2023-12-29

## 2023-12-29 RX ORDER — ARIPIPRAZOLE 5 MG/1
5 TABLET ORAL DAILY
Qty: 90 TABLET | Refills: 0 | Status: SHIPPED | OUTPATIENT
Start: 2023-12-29 | End: 2024-03-28

## 2023-12-29 RX ORDER — DULOXETIN HYDROCHLORIDE 30 MG/1
CAPSULE, DELAYED RELEASE ORAL
Qty: 30 CAPSULE | Refills: 2 | Status: SHIPPED | OUTPATIENT
Start: 2023-12-29

## 2023-12-29 NOTE — BH TREATMENT PLAN
TREATMENT PLAN        [facility]    Name and Date of Birth:  Grace Doherty 23 y.o. 2000  Date of Treatment Plan: December 29, 2023  Diagnosis/Diagnoses:    1. MDD (major depressive disorder), recurrent episode, mild (HCC)    2. JADEN (generalized anxiety disorder)    3. Insomnia, unspecified type    4. Anxiety disorder, unspecified type    5. Screening for endocrine, nutritional, metabolic and immunity disorder    6. Cannabis abuse, daily use        Strengths/Personal Resources for Self-Care: taking medications as prescribed, ability to adapt to life changes, ability to communicate needs, ability to communicate well, ability to listen, ability to reason, ability to understand psychiatric illness, financial means, independence, motivation for treatment, ability to negotiate basic needs, being resoureceful, self-reliance, well educated, willingness to work on problems, work skills    Area/Areas of need: anxiety, anxiety symptoms, depression, depressive symptoms, mood instability, sleep problems, lack of energy, lack of motivation, lack of sleep, stress at work, substance abuse, unstable mood    Long Term Goal: continue improvement in depression  Target Date: 6 months - June 29, 2024  Person/Persons responsible for completion of goal: Grace and Shad Agrawal MD     Short Term Objective (s) - How will we reach this goal?:   Take medications as prescribed  Attend psychiatry appointments regularly  Get blood work drawn  Start psychotherapy  Try sleep hygiene techniques  Avoid alcohol   Avoid drugs   Eat a healthy diet   Take walks regularly  Try breathing exercises  Try relaxation techniques  Target Date: 6 months - June 29, 2024  Person/Persons Responsible for Completion of Goal: Grace     Progress Towards Goals: Continuing treatment    Treatment Modality: medication management every 1-3 months as needed  Review due 180 days from date of this plan: June 26, 2024    Expected length of service: Ongoing treatment    My physician and I have developed this plan together, and I agree to work on the goals and objectives. I understand the treatment goals that were developed for my treatment.    The treatment plan was created between Shad Agrawal MD and Grace Doherty on 12/29/23 at 10:04 AM but not signed at the time of the visit due to COVID social distancing. The plan was reviewed, and verbal consent was given.

## 2023-12-29 NOTE — TELEPHONE ENCOUNTER
Patient called the office and left a vm seeking to switch her appt to virtual. Please reach out to her in regards to this. # [phone]

## 2023-12-29 NOTE — PSYCH
Virtual Visit Disclaimer & Required Documentation  TeleMed provider: Shad Agrawal MD. located at [facility], [address] [place] [address] in [place], [place], [facility]. The patient is also located in [place] which is the state in which I hold an active license.    The patient was identified by name and date of birth. Grace Doherty was informed that this is a telemedicine visit and that the visit is being conducted through [employer] and patient was informed this is a secure, HIPAA-complaint platform. She agrees to proceed. My office door was closed. No one else was in the room. She acknowledged consent and understanding of privacy and security of the video platform. The patient has agreed to participate and understands they can discontinue the visit at any time.    Grace Doherty verbally agrees to participate in Virtual Care Services. Pt is aware that Virtual Care Services could be limited without vital signs or the ability to perform a full hands-on physical exam. The patient understands she or the provider may request at any time to terminate the video visit and request the patient to seek care or treatment in person. Patient is aware this is a billable service.        Psychiatric Follow Up Visit - Behavioral Health   MRN: [id number]  Visit Time  Start: 0930. Stop: 0945. Total: 15 minutes.    Assessment/Plan   Grace Doherty is a 23 y.o. female, Single with prior psychiatric diagnoses of anxiety, depression, insomnia (rule out bipolar), no past psychiatric hospitalizations, no past suicide attempts, h/o self-injurious behaviors (cutting), no h/o physical altercations, PMH significant for h/o lyme disease, gastritis, h/o esophageal candidiasis, daily medical marijuana use, and history of heavy alcohol use in college; with suicide risk factors including history of self-harm as recently as 2022 jan, chronic mental illness, medical problems, chronic pain, financial problems, anxiety, impulsivity, substance abuse  and never .     In the setting of worsening anxiety and depression on both subjective and objective measurements that started after she has been decreased on Cymbalta for months ago at a previous appointment she has felt improvements in anxiety and depression.  Due to worsening symptoms, she is agreeable to increasing her dosage of Cymbalta back up to 90 mg every morning for anxiety, depression, insomnia related to anxiety.  She has decreased usage of marijuana, stressors are stable, and is experiencing some hypersomnia that she feels is related to depression.  In the setting of worsened anxiety, she has also experienced slight increase in panic attack frequency, which Ativan helps for, she takes ativan once a week.  She is agreeable with getting vitamin D to assess for organic causes of worsening anxiety, depression, and insomnia.   he is agreeable with following up in 6 weeks for further medication management and optimization.    Patient was informed of the adverse effects of cannabis use/abuse on their physical health including: respiratory disease in addition to diminished pulmonary functioning, hypertension, heart disease including arrhythmias and/or myocardial infarction, stroke, weakening of the immune system, attention and concentration problems, behavioral problems like substance induced mood disorders and/or psychosis, increased instances of multiple malignancies possibly including the lungs, head/neck and testicles/ovaries including diminished sexual functioning in addition to problematic reproduction, hyperemesis syndrome and interactions with an individual's medication regimen in addition to risk of intoxication, particularly in the presence of sedative agents like alcohol and/or benzodiazepines. Patient was informed of the possible withdrawal syndrome associated to prevalent and persistent use of cannabis including: headache, rigors, tremulousness, anxiety, anger, irritability, insomnia,  fatigue, diminished appetite including possible unintentional decrease in weight and gastrointestinal upset including nausea, vomiting and diarrhea. This writer recommended abstinence from cannabis use/abuse in the presence of the aforementioned effects. Patient was receptive to the recommendations of this writer.     Diagnosis:   Major depressive disorder, recurrent episode, mild - not at goal  Generalized anxiety disorder - not at goal  Insomnia, unspecified- not at goal  Cannabis abuse, daily use - not at goal     Treatment Recommendations/Precautions:  Continue melatonin to 5mg qHS for insomnia  Increase Cymbalta 90 mg qAM for anxiety, depression, mood stability  Trial of lower dose of 60 mg resulted in worsening of anxiety and depression  Continue Abilify 5 mg daily for mood stability, as augmentation for depression  Abilify was increased to 7.5 mg while at [facility] resulted in restlessness, possible akathisia  Continue trazodone 100 mg q.h.s. for insomnia, depression, generalized anxiety  Continue Ativan 1 mg daily for JADEN and panic attack prevention  Has been using 1 pill every week for panic attack prevention     Therapy:  Referral for individual psychotherapy; pending.    Medical:   Follow up with primary care physician for ongoing medical care   Labs:   Follow up vitamin D level  Most recently obtained 10/17/2022, reviewed        Treatment Plan:  The Treatment Plan was previously completed and not due prior to the next visit.. The next plan will be due November 11, 2023.      Treatment Plan:  The next plan will be due 6/28/2024.    -------------------------------------------------------    History of Present Illness   Grace Doherty is a 23 y.o. female, Single; with prior psychiatric diagnoses of anxiety, depression, insomnia (rule out bipolar), no past psychiatric hospitalizations, no past suicide attempts, no  h/o self-injurious behaviors (cutting), no h/o physical altercations, PMH significant for h/o lyme  disease, gastritis, h/o esophageal candidiasis, daily medical marijuana use, and history of heavy alcohol use in college; with suicide risk factors including history of self-harm as recently as 2022 jan, chronic mental illness, medical problems, chronic pain, financial problems, anxiety, impulsivity, substance abuse and never ; now presenting to follow up for anxiety, depression and insomnia.     Today, Grace reports that since stopping Cymbalta 90 mg, she has had worsening anxiety, depression, and insomnia and she would like to go back on the previous dosage.  Denies any SI, HI, AVH or any desires to drink alcohol.  She has made a significant improvement in her frequency of marijuana usage.    Anxiety: 7/10.  Endorses panic attacks increased in frequency from once every 2 weeks to once a week and she is taking Ativan to treat, effective.  Last around 5 minutes associated with chest pain, racing thoughts, shortness of breath.  Worsened in frequency since Cymbalta was decreased at previous appointment.  Depression: 7/10.  Feels stuck in a rut, hopeless at times, denies suicidal ideation or SIB, but states living at home and dealing with the regular stressors of family may be contributing.  She sleeps more, around 10 hours a night.  Stressors: living at home, same stressors, stable, works at [employer] but job is stressful, angry customers  Sleep: No difficulty with initiation or maintenance, takes trazodone and melatonin at times, feels that sleep has worsened since decreasing Cymbalta, now sleeping around 10 hours a night for the last 3 months.  She is agreeable with increasing Cymbalta back to previous dose will continue to assess.  Cannabis: Decrease her usage problem 2 g a day to left milligram a day  AE's: Denies  pain: headaches, stomach pain due to gastroparesis, understands to avoid Reglan while on Abilify  job: There is a mold problem at her [employer] where she works and she is looking  to change jobs  fco: denies  alcohol: socially  akathisia: denies    PHQ-2/9 Depression Screening    Little interest or pleasure in doing things: 3 - nearly every day  Feeling down, depressed, or hopeless: 2 - more than half the days  Trouble falling or staying asleep, or sleeping too much: 3 - nearly every day  Feeling tired or having little energy: 3 - nearly every day  Poor appetite or overeating: 3 - nearly every day  Feeling bad about yourself - or that you are a failure or have let yourself or your family down: 2 - more than half the days  Trouble concentrating on things, such as reading the newspaper or watching television: 3 - nearly every day  Moving or speaking so slowly that other people could have noticed. Or the opposite - being so fidgety or restless that you have been moving around a lot more than usual: 1 - several days  Thoughts that you would be better off dead, or of hurting yourself in some way: 0 - not at all  PHQ-9 Score: 20   PHQ-9 Interpretation: Severe depression          JADEN-7 Flowsheet Screening      Flowsheet Row Most Recent Value   Over the last 2 weeks, how often have you been bothered by any of the following problems?    Feeling nervous, anxious, or on edge 2   Not being able to stop or control worrying 2   Worrying too much about different things 1   Trouble relaxing 2   Being so restless that it is hard to sit still 3   Becoming easily annoyed or irritable 1   Feeling afraid as if something awful might happen 2   JADEN-7 Total Score 13              Psychiatric Review Of Systems:  Grace reports Symptoms as described in HPI.  Grace denies Current suicidal thoughts, plan, or intent, Current thoughts of self-harm, or Current homicidal thoughts, plan, or intent.    Medical Review Of Systems:  Complete review of systems is negative except as noted above.    ------------------------------------  Past Medical History:   Diagnosis Date    Anemia     Anxiety     Asthma     hx of as child -  outgrew     Depression     Gastritis     Gastroparesis     GERD (gastroesophageal reflux disease)     Lyme disease     Migraine       Past Surgical History:   Procedure Laterality Date    ESOPHAGOGASTRODUODENOSCOPY      EGD with bipsy    UPPER GASTROINTESTINAL ENDOSCOPY         Visit Vitals  OB Status Birth Control   Smoking Status Never      Wt Readings from Last 6 Encounters:   12/27/23 68.9 kg (152 lb)   11/01/23 67.1 kg (148 lb)   10/27/23 67.6 kg (149 lb)   10/17/23 68.9 kg (152 lb)   08/29/23 71.2 kg (157 lb)   08/23/23 71.2 kg (157 lb)        Mental Status Exam:  Appearance:  alert, good eye contact, appears stated age, casually dressed, and appropriate grooming and hygiene   Behavior:  calm and cooperative   Motor: no abnormal movements and normal gait and balance   Speech:  spontaneous and coherent   Mood:  depressed and anxious   Affect:  mood-congruent and constricted   Thought Process:  Organized, logical, goal-directed   Thought Content: no verbalized delusions or overt paranoia   Perceptual disturbances: no reported hallucinations and does not appear to be responding to internal stimuli at this time   Risk Potential: No active or passive suicidal or homicidal ideation was verbalized during interview   Cognition: oriented to self and situation, appears to be of average intelligence, and cognition not formally tested   Insight:  Fair   Judgment: Good       Meds/Allergies    Allergies   Allergen Reactions    Pineapple - Food Allergy Anaphylaxis    Penicillins Hives and Vomiting     Current Outpatient Medications   Medication Instructions    albuterol (ProAir HFA) 90 mcg/act inhaler 2 puffs, Inhalation, Every 6 hours PRN    ARIPiprazole (ABILIFY) 5 mg, Oral, Daily    DULoxetine (CYMBALTA) 60 mg, Oral, Daily    levonorgestrel-ethinyl estradiol (Marlissa) 0.15-30 MG-MCG per tablet 1 tablet, Oral, Daily    LORazepam (ATIVAN) 1 mg, Oral, Daily PRN    traZODone (DESYREL) 100 mg, Oral, Daily at bedtime     "    Labs & Imaging:  I have personally reviewed all pertinent laboratory tests and imaging results.   No visits with results within 2 Month(s) from this visit.   Latest known visit with results is:   Office Visit on 10/27/2023   Component Date Value Ref Range Status     RAPID STREP A 10/27/2023 Negative  Negative Final    SARS-CoV-2 10/27/2023 Negative  Negative Final    INFLUENZA A PCR 10/27/2023 Negative  Negative Final    INFLUENZA B PCR 10/27/2023 Negative  Negative Final    Throat Culture 10/27/2023 Few Colonies of Beta Hemolytic Streptococcus Group G (A)   Final    This organism is intrinsically susceptible to Penicillin.  If sensitivites to other antibiotics are required, \"Provider/Physician\" please call the Microbiology Department at [phone] within 5 days.     -------------------------------------------------------    Medical Decision Making / Counseling / Coordination of Care:  The following interventions are recommended: return in 6 weeks for follow up, refer for individual therapy, and contracts for safety at present - agrees to call Crisis Intervention Service or go to ED if feeling unsafe. Individual supportive psychotherapy was provided.    Although patient's acute lethality risk is LOW, long-term/chronic lethality risk is mildly elevated given the risk factors listed above. However, at the current moment, Grace is future-oriented, forward-thinking, and demonstrates ability to act in a self-preserving manner as evidenced by volitionally seeking psychiatric evaluation and treatment today. To mitigate future risk, patient should adhere to treatment recommendations, avoid alcohol/illicit substance use, utilize community-based resources and familiar support, and prioritize mental health treatment. The diagnosis and treatment plan were reviewed with the patient. Risks, benefits, and alternatives to treatment were discussed. The importance of medication and treatment compliance was reviewed with the " patient.   PARQ for neurontin including depression/suicidality, allergic reactions (SJS, angioedema, rhabdomyolysis, eosinophilia, anaphylaxis), dizziness and somnolence, diarrhea, xerostomia, headaches, drug interactions and others.   PARQ completed including serotonin syndrome, SIADH, worsened depression/suicidality, induction of fco, blood pressure changes and GI distress, weight gain, sexual side effects, insomnia, sedation, potential for drug interactions, and others.   PARQ for neurontin including depression/suicidality, allergic reactions (SJS, angioedema, rhabdomyolysis, eosinophilia, anaphylaxis), dizziness and somnolence, diarrhea, xerostomia, headaches, drug interactions and others.   Discussed with patient the risks of sedation, respiratory depression, impairment in judgment and motor function. Depression, mood changes, GI changes, and others discussed. Patient was also informed of risks of being on or starting opioid medications due to drug interactions and potential for serious respiratory depression and death.      Controlled Medication Discussion:   Grace has been filling controlled prescriptions on time as prescribed   Discussed with Grace the risks of sedation, respiratory depression, impairment of ability to drive and potential for abuse and addiction related to treatment with benzodiazepine medications. She understands risk of treatment with benzodiazepine medications, agrees to not drive if feels impaired and agrees to take medications as prescribed  Discussed with Grace Black Box warning on concurrent use of benzodiazepines and opioid medications including sedation, respiratory depression, coma and death. She understands the risk of treatment with benzodiazepines in addition to opioids and wants to continue taking those medications  Discussed with Grace increased risk of impairment related to concurrent use of benzodiazepines and hypnotic medications including excessive sedation, psychomotor  impairment and respiratory depression. She understands the risk of treatment with benzodiazepines in addition to hypnotic medications and wants to continue taking those medications  Discussed with Grace need to slowly taper off benzodiazepines as recommended by [place] Prescription Drug Monitoring Program, due to concurrent use of opioid medications and increased risk of sedation, respiratory depression, coma and death with that combination     -------------------------------------------------------     Historical Information:  Information is copied from the previous visit and updated today as appropriate.     Past Psychiatric History:    No significant PPH, no past psychiatric hospitalizations, no past suicide attempts, no h/o self-injurious behaviors, teen superficial cutting 2 months in setting of anxiety, jan 2022 ago cut again due to dropping out of masters program because disliked it, no h/o physical altercations. Currently in outpatient therapy through [facility] on weekly basis.    Past Med Trials: Fluoxetine up to 40 mg daily (ineffective after some time), Seroquel XR up to 150 mg qhs (drowsiness), gabapentin (drowsy, hard to remember doses), zoloft (ineffective after some time)        Family Psychiatric History:    Father- Bipolar I d/o (on Prozac, Seroquel)  Pat. Great Grandmother- Schizophrenia  Mother- Panic Attacks (Xanax, previously on Zoloft)  Mat. Grandmother- Anxiety  Maternal Side- Chronic abdominal pain     Denies substance abuse or suicidality in immediate relations.         Social History:  Domiciled with parents, brother (12 y/o) in [place]. Mother employed in [employer], father employed as pharmaceutical consultant. Reports infrequent caffeine use. No active substance use. Reports that she broke up with boyfriend in 2/2021.  No current relationships.        Substance Abuse History:  Alcohol- drinking occasionally on weekends, a couple of drinks, about once per week, limited  alcohol use  Cannabis- occasional use, about once per month, currently using medical marijuana on daily basis, has medical MJ card and smokes daily (says its for anxiety and appetite stimulant)        Traumatic History:  Denies any h/o physical or sexual abuse. The following portions of the patient's history were reviewed and updated as appropriate: allergies, current medications, past family history, past medical history, past social history, past surgical history and problem list.     Denies access to lethal means / firearms.      This note was not shared with the patient due to reasonable likelihood of causing patient harm     Note: This chart was completed utilizing speech software.  Grammatical errors, random word insertions, pronoun errors, and incomplete sentences are an occasional consequence of the system due to software limitation, ambient noise, and hardware issues.  Any formal questions or concerns about the context, text, or information contained within the body of this dictation should be directly addressed to the physician for clarification.    Shad Agrawal MD

## 2024-01-02 ENCOUNTER — APPOINTMENT (EMERGENCY)
Dept: RADIOLOGY | Facility: HOSPITAL | Age: 24
End: 2024-01-02
Payer: COMMERCIAL

## 2024-01-02 ENCOUNTER — OFFICE VISIT (OUTPATIENT)
Dept: URGENT CARE | Facility: CLINIC | Age: 24
End: 2024-01-02
Payer: COMMERCIAL

## 2024-01-02 ENCOUNTER — HOSPITAL ENCOUNTER (EMERGENCY)
Facility: HOSPITAL | Age: 24
Discharge: HOME | End: 2024-01-02
Attending: EMERGENCY MEDICINE
Payer: COMMERCIAL

## 2024-01-02 VITALS
TEMPERATURE: 98.3 F | RESPIRATION RATE: 18 BRPM | SYSTOLIC BLOOD PRESSURE: 132 MMHG | OXYGEN SATURATION: 98 % | DIASTOLIC BLOOD PRESSURE: 81 MMHG | HEART RATE: 82 BPM

## 2024-01-02 VITALS
SYSTOLIC BLOOD PRESSURE: 115 MMHG | OXYGEN SATURATION: 99 % | WEIGHT: 147 LBS | BODY MASS INDEX: 24.49 KG/M2 | TEMPERATURE: 98 F | DIASTOLIC BLOOD PRESSURE: 57 MMHG | RESPIRATION RATE: 16 BRPM | HEIGHT: 65 IN | HEART RATE: 104 BPM

## 2024-01-02 DIAGNOSIS — R07.89 ATYPICAL CHEST PAIN: Primary | ICD-10-CM

## 2024-01-02 DIAGNOSIS — R06.02 SHORTNESS OF BREATH: Primary | ICD-10-CM

## 2024-01-02 DIAGNOSIS — R07.89 CHEST HEAVINESS: ICD-10-CM

## 2024-01-02 LAB
ALBUMIN SERPL BCP-MCNC: 4.4 G/DL (ref 3.5–5)
ALP SERPL-CCNC: 73 U/L (ref 34–104)
ALT SERPL W P-5'-P-CCNC: 10 U/L (ref 7–52)
ANION GAP SERPL CALCULATED.3IONS-SCNC: 8 MMOL/L
AST SERPL W P-5'-P-CCNC: 16 U/L (ref 13–39)
ATRIAL RATE: 84 BPM
BASOPHILS # BLD AUTO: 0.04 THOUSANDS/ÂΜL (ref 0–0.1)
BASOPHILS NFR BLD AUTO: 0 % (ref 0–1)
BILIRUB SERPL-MCNC: 0.54 MG/DL (ref 0.2–1)
BUN SERPL-MCNC: 8 MG/DL (ref 5–25)
CALCIUM SERPL-MCNC: 9.5 MG/DL (ref 8.4–10.2)
CARDIAC TROPONIN I PNL SERPL HS: <2 NG/L
CHLORIDE SERPL-SCNC: 103 MMOL/L (ref 96–108)
CO2 SERPL-SCNC: 25 MMOL/L (ref 21–32)
CREAT SERPL-MCNC: 0.75 MG/DL (ref 0.6–1.3)
D DIMER PPP FEU-MCNC: <0.27 UG/ML FEU
EOSINOPHIL # BLD AUTO: 0.04 THOUSAND/ÂΜL (ref 0–0.61)
EOSINOPHIL NFR BLD AUTO: 0 % (ref 0–6)
ERYTHROCYTE [DISTWIDTH] IN BLOOD BY AUTOMATED COUNT: 12.6 % (ref 11.6–15.1)
GFR SERPL CREATININE-BSD FRML MDRD: 111 ML/MIN/1.73SQ M
GLUCOSE SERPL-MCNC: 80 MG/DL (ref 65–140)
HCT VFR BLD AUTO: 40.4 % (ref 34.8–46.1)
HGB BLD-MCNC: 13 G/DL (ref 11.5–15.4)
IMM GRANULOCYTES # BLD AUTO: 0.02 THOUSAND/UL (ref 0–0.2)
IMM GRANULOCYTES NFR BLD AUTO: 0 % (ref 0–2)
LYMPHOCYTES # BLD AUTO: 3.12 THOUSANDS/ÂΜL (ref 0.6–4.47)
LYMPHOCYTES NFR BLD AUTO: 35 % (ref 14–44)
MCH RBC QN AUTO: 28.6 PG (ref 26.8–34.3)
MCHC RBC AUTO-ENTMCNC: 32.2 G/DL (ref 31.4–37.4)
MCV RBC AUTO: 89 FL (ref 82–98)
MONOCYTES # BLD AUTO: 0.57 THOUSAND/ÂΜL (ref 0.17–1.22)
MONOCYTES NFR BLD AUTO: 6 % (ref 4–12)
NEUTROPHILS # BLD AUTO: 5.14 THOUSANDS/ÂΜL (ref 1.85–7.62)
NEUTS SEG NFR BLD AUTO: 59 % (ref 43–75)
NRBC BLD AUTO-RTO: 0 /100 WBCS
P AXIS: 48 DEGREES
PLATELET # BLD AUTO: 257 THOUSANDS/UL (ref 149–390)
PMV BLD AUTO: 9.5 FL (ref 8.9–12.7)
POTASSIUM SERPL-SCNC: 3.3 MMOL/L (ref 3.5–5.3)
PR INTERVAL: 128 MS
PROT SERPL-MCNC: 7.7 G/DL (ref 6.4–8.4)
QRS AXIS: 39 DEGREES
QRSD INTERVAL: 90 MS
QT INTERVAL: 352 MS
QTC INTERVAL: 415 MS
RBC # BLD AUTO: 4.55 MILLION/UL (ref 3.81–5.12)
SODIUM SERPL-SCNC: 136 MMOL/L (ref 135–147)
T WAVE AXIS: 34 DEGREES
VENTRICULAR RATE: 84 BPM
WBC # BLD AUTO: 8.93 THOUSAND/UL (ref 4.31–10.16)

## 2024-01-02 PROCEDURE — 99213 OFFICE O/P EST LOW 20 MIN: CPT | Performed by: NURSE PRACTITIONER

## 2024-01-02 PROCEDURE — 84484 ASSAY OF TROPONIN QUANT: CPT | Performed by: EMERGENCY MEDICINE

## 2024-01-02 PROCEDURE — 85379 FIBRIN DEGRADATION QUANT: CPT | Performed by: EMERGENCY MEDICINE

## 2024-01-02 PROCEDURE — 85025 COMPLETE CBC W/AUTO DIFF WBC: CPT | Performed by: EMERGENCY MEDICINE

## 2024-01-02 PROCEDURE — 36415 COLL VENOUS BLD VENIPUNCTURE: CPT

## 2024-01-02 PROCEDURE — 71045 X-RAY EXAM CHEST 1 VIEW: CPT

## 2024-01-02 PROCEDURE — 80053 COMPREHEN METABOLIC PANEL: CPT | Performed by: EMERGENCY MEDICINE

## 2024-01-02 PROCEDURE — 93005 ELECTROCARDIOGRAM TRACING: CPT | Performed by: NURSE PRACTITIONER

## 2024-01-02 PROCEDURE — 99284 EMERGENCY DEPT VISIT MOD MDM: CPT | Performed by: EMERGENCY MEDICINE

## 2024-01-02 PROCEDURE — 99285 EMERGENCY DEPT VISIT HI MDM: CPT

## 2024-01-02 PROCEDURE — 93005 ELECTROCARDIOGRAM TRACING: CPT

## 2024-01-02 NOTE — PROGRESS NOTES
[facility]        NAME: Grace Doherty is a 23 y.o. female  : 2000    MRN: [id number]  DATE: 2024  TIME: 5:36 PM    Assessment and Plan   Shortness of breath [R06.02]  1. Shortness of breath  POCT ECG    Transfer to other facility      2. Chest heaviness  POCT ECG    Transfer to other facility        --Patient agreeable to proceed directly to ER for further evaluation and treatment.  Has family member who will drive her. Deemed safe and stable for non-ambulance transport.          Patient Instructions     --Please proceed directly to ER for further evaluation and treatment.      Chief Complaint     Chief Complaint   Patient presents with    Cold Like Symptoms     Post covid congestion.          History of Present Illness       Here with complaints of cough, congestion starting 3 weeks ago.  Tested positive for COVID on .  Cough, congestion has since resolved, but woke up today with increased bilateral chest heaviness and shortness of breath, increased with exertion.  Ongoing since.  Recently referred to  pulmonary and cardiology for post-COVID dyspnea.  Symptoms worse today, however.  Does not feel like anxiety or panic attack.    Tried inhaler, not helping. EIA when younger, but not recently.     Denies cough, fever, abdominal pain, N/V, calf pain, palpitations, dizziness.     On OCP.  Denies prior history of DVT/PE.      Shortness of Breath  The current episode started 1 to 4 weeks ago. The problem occurs every few minutes. The problem has been waxing and waning since onset. Associated symptoms include chest pain. Pertinent negatives include no leg swelling, orthopnea, rhinorrhea, sore throat or wheezing. The symptoms are aggravated by emotional upset, occupational exposure, any activity and weather changes.       Review of Systems   Review of Systems   Constitutional:  Negative for fever.   HENT:  Negative for ear pain, rhinorrhea and sore throat.    Respiratory:  Positive for  shortness of breath. Negative for wheezing.    Cardiovascular:  Positive for chest pain. Negative for orthopnea and leg swelling.   Gastrointestinal:  Negative for abdominal pain and vomiting.   Musculoskeletal:  Negative for neck pain.   Skin:  Negative for rash.   Neurological:  Positive for headaches.         Current Medications       Current Outpatient Medications:     albuterol (ProAir HFA) 90 mcg/act inhaler, Inhale 2 puffs every 6 (six) hours as needed for wheezing, Disp: 8.5 g, Rfl: 0    ARIPiprazole (ABILIFY) 5 mg tablet, Take 1 tablet (5 mg total) by mouth daily, Disp: 90 tablet, Rfl: 0    DULoxetine (Cymbalta) 30 mg delayed release capsule, Take 90 mg (one 30 mg tablet and one 60 mg tablet) every morning for a total of 90 mg daily, Disp: 30 capsule, Rfl: 2    DULoxetine (CYMBALTA) 60 mg delayed release capsule, Take 90 mg (one 30 mg tablet and one 60 mg tablet) every morning for a total of 90 mg daily, Disp: 30 capsule, Rfl: 2    levonorgestrel-ethinyl estradiol (Marlissa) 0.15-30 MG-MCG per tablet, Take 1 tablet by mouth daily, Disp: 84 tablet, Rfl: 4    LORazepam (ATIVAN) 1 mg tablet, Take 1 tablet (1 mg total) by mouth daily as needed for anxiety, Disp: 30 tablet, Rfl: 2    traZODone (DESYREL) 100 mg tablet, TAKE 1 TABLET BY MOUTH DAILY AT BEDTIME, Disp: 90 tablet, Rfl: 1    Current Allergies     Allergies as of 01/02/2024 - Reviewed 01/02/2024   Allergen Reaction Noted    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     Past Medical History:   Diagnosis Date    Anemia     Anxiety     Asthma     hx of as child - outgrew     Depression     Gastritis     Gastroparesis     GERD (gastroesophageal reflux disease)     Lyme  "disease     Migraine        Past Surgical History:   Procedure Laterality Date    ESOPHAGOGASTRODUODENOSCOPY      EGD with bipsy    UPPER GASTROINTESTINAL ENDOSCOPY         Family History   Problem Relation Age of Onset    Anxiety disorder Mother     Miscarriages / Stillbirths Mother     Bipolar disorder Father     Sleep disorder Father     Anxiety disorder Maternal Grandmother     Hyperlipidemia Maternal Grandmother     Hypertension Maternal Grandmother     Thyroid disease Maternal Grandmother     Ovarian cancer Maternal Grandmother     Miscarriages / Stillbirths Maternal Grandmother     Lung cancer Maternal Grandfather     Kidney disease Paternal Grandmother     Prostate cancer Paternal Grandfather     Skin cancer Paternal Grandfather     Breast cancer Maternal Aunt     Breast cancer Other     Substance Abuse Neg Hx          Medications have been verified.        Objective   /57   Pulse 104   Temp 98 °F (36.7 °C)   Resp 16   Ht 5' 5\" (1.651 m)   Wt 66.7 kg (147 lb)   SpO2 99%   BMI 24.46 kg/m²   No LMP recorded. (Menstrual status: Birth Control).       Physical Exam     Physical Exam  Constitutional:       General: She is not in acute distress.     Appearance: Normal appearance. She is well-developed. She is not ill-appearing, toxic-appearing or diaphoretic.   HENT:      Head: Normocephalic.      Right Ear: Tympanic membrane, ear canal and external ear normal.      Left Ear: Tympanic membrane, ear canal and external ear normal.      Nose: No congestion or rhinorrhea.      Mouth/Throat:      Pharynx: No oropharyngeal exudate or posterior oropharyngeal erythema.   Eyes:      General:         Right eye: No discharge.         Left eye: No discharge.   Cardiovascular:      Rate and Rhythm: Normal rate and regular rhythm.      Heart sounds: Normal heart sounds. No murmur heard.  Pulmonary:      Effort: Pulmonary effort is normal. No respiratory distress.      Breath sounds: Normal breath sounds. No stridor. " No wheezing, rhonchi or rales.      Comments: Breathing non-labored. RR=18. No cough.  No reproducible chest wall tenderness .   Chest:      Chest wall: No tenderness.   Abdominal:      Tenderness: There is no abdominal tenderness.   Musculoskeletal:         General: No tenderness.      Cervical back: Neck supple.      Comments: No calf tenderness.     Lymphadenopathy:      Cervical: No cervical adenopathy.   Skin:     General: Skin is warm and dry.   Neurological:      Mental Status: She is alert and oriented to person, place, and time.      Deep Tendon Reflexes: Reflexes are normal and symmetric.   Psychiatric:         Mood and Affect: Mood normal.

## 2024-01-03 ENCOUNTER — TELEPHONE (OUTPATIENT)
Dept: FAMILY MEDICINE CLINIC | Facility: CLINIC | Age: 24
End: 2024-01-03

## 2024-01-03 NOTE — ED PROVIDER NOTES
History  Chief Complaint   Patient presents with    Shortness of Breath     Pt reports COVID+ 15 days ago. Pt reports CP that started today, SOB when going up stairs that has gotten worse. Pt denies cardiac hx, reports childhood asthma.        History provided by:  Patient   used: No    Shortness of Breath  Severity:  Mild  Onset quality:  Gradual  Duration:  1 day  Timing:  Intermittent  Progression:  Waxing and waning  Context: URI    Relieved by:  Nothing  Worsened by:  Nothing  Ineffective treatments:  None tried  Associated symptoms: no abdominal pain, no chest pain, no diaphoresis, no fever, no neck pain, no rash and no vomiting    Risk factors: no hx of PE/DVT, no recent surgery and no tobacco use        Prior to Admission Medications   Prescriptions Last Dose Informant Patient Reported? Taking?   ARIPiprazole (ABILIFY) 5 mg tablet   No No   Sig: Take 1 tablet (5 mg total) by mouth daily   DULoxetine (CYMBALTA) 60 mg delayed release capsule   No No   Sig: Take 90 mg (one 30 mg tablet and one 60 mg tablet) every morning for a total of 90 mg daily   DULoxetine (Cymbalta) 30 mg delayed release capsule   No No   Sig: Take 90 mg (one 30 mg tablet and one 60 mg tablet) every morning for a total of 90 mg daily   LORazepam (ATIVAN) 1 mg tablet  Self No No   Sig: Take 1 tablet (1 mg total) by mouth daily as needed for anxiety   albuterol (ProAir HFA) 90 mcg/act inhaler   No No   Sig: Inhale 2 puffs every 6 (six) hours as needed for wheezing   levonorgestrel-ethinyl estradiol (Marlissa) 0.15-30 MG-MCG per tablet   No No   Sig: Take 1 tablet by mouth daily   traZODone (DESYREL) 100 mg tablet   No No   Sig: TAKE 1 TABLET BY MOUTH DAILY AT BEDTIME      Facility-Administered Medications: None       Past Medical History:   Diagnosis Date    Anemia     Anxiety     Asthma     hx of as child - outgrew     Depression     Gastritis     Gastroparesis     GERD (gastroesophageal reflux disease)     Lyme disease      Migraine        Past Surgical History:   Procedure Laterality Date    ESOPHAGOGASTRODUODENOSCOPY      EGD with bipsy    UPPER GASTROINTESTINAL ENDOSCOPY         Family History   Problem Relation Age of Onset    Anxiety disorder Mother     Miscarriages / Stillbirths Mother     Bipolar disorder Father     Sleep disorder Father     Anxiety disorder Maternal Grandmother     Hyperlipidemia Maternal Grandmother     Hypertension Maternal Grandmother     Thyroid disease Maternal Grandmother     Ovarian cancer Maternal Grandmother     Miscarriages / Stillbirths Maternal Grandmother     Lung cancer Maternal Grandfather     Kidney disease Paternal Grandmother     Prostate cancer Paternal Grandfather     Skin cancer Paternal Grandfather     Breast cancer Maternal Aunt     Breast cancer Other     Substance Abuse Neg Hx      I have reviewed and agree with the history as documented.    E-Cigarette/Vaping    E-Cigarette Use Never User      E-Cigarette/Vaping Substances    Nicotine No     THC Yes medical    CBD No     Flavoring No     Other No     Unknown No      Social History     Tobacco Use    Smoking status: Never    Smokeless tobacco: Never   Vaping Use    Vaping status: Never Used   Substance Use Topics    Alcohol use: Yes     Alcohol/week: 2.0 standard drinks of alcohol     Types: 2 Standard drinks or equivalent per week     Comment: Socially    Drug use: Yes     Frequency: 7.0 times per week     Types: Marijuana     Comment: Medical Marijuana       Review of Systems   Constitutional:  Positive for fatigue. Negative for activity change, appetite change, chills, diaphoresis and fever.   HENT:  Negative for congestion, dental problem, ear discharge, facial swelling, nosebleeds, rhinorrhea, sinus pressure and trouble swallowing.    Eyes:  Negative for photophobia, discharge, itching and visual disturbance.   Respiratory:  Positive for shortness of breath. Negative for choking and chest tightness.    Cardiovascular:  Negative  for chest pain, palpitations and leg swelling.   Gastrointestinal:  Negative for abdominal distention, abdominal pain, constipation, diarrhea, nausea and vomiting.   Endocrine: Negative for polydipsia and polyphagia.   Genitourinary:  Negative for decreased urine volume, difficulty urinating, dysuria, flank pain, frequency, hematuria, vaginal bleeding and vaginal discharge.   Musculoskeletal:  Negative for back pain, gait problem, joint swelling, neck pain and neck stiffness.   Skin:  Negative for color change and rash.   Neurological:  Positive for weakness. Negative for dizziness, facial asymmetry, speech difficulty and light-headedness.   Psychiatric/Behavioral:  Negative for agitation and behavioral problems. The patient is not nervous/anxious and is not hyperactive.    All other systems reviewed and are negative.      Physical Exam  Physical Exam  Vitals and nursing note reviewed.   Constitutional:       General: She is not in acute distress.     Appearance: She is well-developed.   HENT:      Head: Normocephalic and atraumatic.   Eyes:      Pupils: Pupils are equal, round, and reactive to light.   Cardiovascular:      Rate and Rhythm: Normal rate and regular rhythm.      Heart sounds: Normal heart sounds. No murmur heard.  Pulmonary:      Effort: Pulmonary effort is normal. No respiratory distress.      Breath sounds: Normal breath sounds. No wheezing or rales.   Abdominal:      General: Bowel sounds are normal. There is no distension.      Palpations: Abdomen is soft.      Tenderness: There is no abdominal tenderness. There is no guarding or rebound.   Musculoskeletal:         General: No deformity. Normal range of motion.      Cervical back: Normal range of motion and neck supple.   Lymphadenopathy:      Cervical: No cervical adenopathy.   Skin:     Capillary Refill: Capillary refill takes less than 2 seconds.      Findings: No erythema or rash.   Neurological:      Mental Status: She is alert and oriented  to person, place, and time.      Cranial Nerves: No cranial nerve deficit.      Motor: No abnormal muscle tone.      Coordination: Coordination normal.   Psychiatric:         Behavior: Behavior normal.         Vital Signs  ED Triage Vitals [01/02/24 1816]   Temperature Pulse Respirations Blood Pressure SpO2   98.3 °F (36.8 °C) 82 18 132/81 98 %      Temp Source Heart Rate Source Patient Position - Orthostatic VS BP Location FiO2 (%)   Oral Monitor Sitting Right arm --      Pain Score       --           Vitals:    01/02/24 1816   BP: 132/81   Pulse: 82   Patient Position - Orthostatic VS: Sitting         Visual Acuity      ED Medications  Medications - No data to display    Diagnostic Studies  Results Reviewed       Procedure Component Value Units Date/Time    D-dimer, quantitative [[id number]]  (Normal) Collected: 01/02/24 1825    Lab Status: Final result Specimen: Blood from Arm, Left Updated: 01/02/24 1914     D-Dimer, Quant <0.27 ug/ml FEU     HS Troponin 0hr (reflex protocol) [[id number]]  (Normal) Collected: 01/02/24 1825    Lab Status: Final result Specimen: Blood from Arm, Left Updated: 01/02/24 1855     hs TnI 0hr <2 ng/L     Comprehensive metabolic panel [[id number]]  (Abnormal) Collected: 01/02/24 1825    Lab Status: Final result Specimen: Blood from Arm, Left Updated: 01/02/24 1852     Sodium 136 mmol/L      Potassium 3.3 mmol/L      Chloride 103 mmol/L      CO2 25 mmol/L      ANION GAP 8 mmol/L      BUN 8 mg/dL      Creatinine 0.75 mg/dL      Glucose 80 mg/dL      Calcium 9.5 mg/dL      AST 16 U/L      ALT 10 U/L      Alkaline Phosphatase 73 U/L      Total Protein 7.7 g/dL      Albumin 4.4 g/dL      Total Bilirubin 0.54 mg/dL      eGFR 111 ml/min/1.73sq m     Narrative:      National Kidney Disease Foundation guidelines for Chronic Kidney Disease (CKD):     Stage 1 with normal or high GFR (GFR > 90 mL/min/1.73 square meters)    Stage 2 Mild CKD (GFR = 60-89 mL/min/1.73 square meters)    Stage 3A Moderate  CKD (GFR = 45-59 mL/min/1.73 square meters)    Stage 3B Moderate CKD (GFR = 30-44 mL/min/1.73 square meters)    Stage 4 Severe CKD (GFR = 15-29 mL/min/1.73 square meters)    Stage 5 End Stage CKD (GFR <15 mL/min/1.73 square meters)  Note: GFR calculation is accurate only with a steady state creatinine    CBC and differential [[id number]] Collected: 01/02/24 1825    Lab Status: Final result Specimen: Blood from Arm, Left Updated: 01/02/24 1834     WBC 8.93 Thousand/uL      RBC 4.55 Million/uL      Hemoglobin 13.0 g/dL      Hematocrit 40.4 %      MCV 89 fL      MCH 28.6 pg      MCHC 32.2 g/dL      RDW 12.6 %      MPV 9.5 fL      Platelets 257 Thousands/uL      nRBC 0 /100 WBCs      Neutrophils Relative 59 %      Immat GRANS % 0 %      Lymphocytes Relative 35 %      Monocytes Relative 6 %      Eosinophils Relative 0 %      Basophils Relative 0 %      Neutrophils Absolute 5.14 Thousands/µL      Immature Grans Absolute 0.02 Thousand/uL      Lymphocytes Absolute 3.12 Thousands/µL      Monocytes Absolute 0.57 Thousand/µL      Eosinophils Absolute 0.04 Thousand/µL      Basophils Absolute 0.04 Thousands/µL                    XR chest 1 view portable    (Results Pending)              Procedures  ECG 12 Lead Documentation Only    Date/Time: 1/2/2024 7:35 PM    Performed by: Brenden Martines MD  Authorized by: Brenden Martines MD    Indications / Diagnosis:  SOB  ECG reviewed by me, the ED Provider: yes    Patient location:  ED  Interpretation:     Interpretation: normal    Rate:     ECG rate:  74    ECG rate assessment: normal    Rhythm:     Rhythm: sinus rhythm    Ectopy:     Ectopy: none    QRS:     QRS axis:  Normal    QRS intervals:  Normal  Conduction:     Conduction: normal    ST segments:     ST segments:  Normal  T waves:     T waves: normal             ED Course             HEART Risk Score      Flowsheet Row Most Recent Value   Heart Score Risk Calculator    History 0 Filed at: 01/02/2024 1937   ECG 0 Filed  at: 01/02/2024 1937   Age 0 Filed at: 01/02/2024 1937   Risk Factors 0 Filed at: 01/02/2024 1937   Troponin 0 Filed at: 01/02/2024 1937   HEART Score 0 Filed at: 01/02/2024 1937                                        Medical Decision Making  Shortness of breath, worsened with exertion.  Recent COVID infection.  Sent from urgent care for further evaluation.  EKG with no acute ischemia.  Troponin negative, D-dimer negative, chest x-ray with no acute cardiopulmonary disease.  Patient feels better in the time of the ED visit.  She has no wheezing.  She has no abnormal breath sounds.  She is stable for discharge home and continued outpatient follow-up.    Amount and/or Complexity of Data Reviewed  Labs: ordered.  Radiology: ordered and independent interpretation performed.     Details: Independent interpretation of chest x-ray, no acute cardiopulmonary is identified on my independent evaluation.  ECG/medicine tests: ordered and independent interpretation performed.    Risk  Decision regarding hospitalization.             Disposition  Final diagnoses:   Atypical chest pain     Time reflects when diagnosis was documented in both MDM as applicable and the Disposition within this note       Time User Action Codes Description Comment    1/2/2024  7:22 PM Brenden Martines Add [R07.89] Atypical chest pain           ED Disposition       ED Disposition   Discharge    Condition   Stable    Date/Time   Tue Jan 2, 2024 1922    Comment   Grace Doherty discharge to home/self care.                   Follow-up Information       Follow up With Specialties Details Why Contact Info Additional Information    Karla Szymanski MD Family Medicine Schedule an appointment as soon as possible for a visit in 2 days As needed [address] [facility] [address]  [phone]       [facility] Emergency Department Emergency Medicine Go to  As needed [address] [facility] [id number] [phone] Count includes the [facility]  [facility], [address] [place], [place], [address]            Patient's Medications   Discharge Prescriptions    No medications on file       No discharge procedures on file.    PDMP Review         Value Time User    PDMP Reviewed  Yes 12/29/2023  9:24 AM Shad Agrawal MD            ED Provider  Electronically Signed by             Brenden Martines MD  01/02/24 [id number]

## 2024-01-03 NOTE — TELEPHONE ENCOUNTER
----- Message from MARC Osborne sent at 1/3/2024  9:42 AM EST -----  Please let the patient know that her chest xray was normal.

## 2024-01-04 ENCOUNTER — NURSE TRIAGE (OUTPATIENT)
Age: 24
End: 2024-01-04

## 2024-01-04 ENCOUNTER — HOSPITAL ENCOUNTER (EMERGENCY)
Facility: HOSPITAL | Age: 24
Discharge: HOME | End: 2024-01-04
Attending: EMERGENCY MEDICINE
Payer: COMMERCIAL

## 2024-01-04 ENCOUNTER — APPOINTMENT (EMERGENCY)
Dept: RADIOLOGY | Facility: HOSPITAL | Age: 24
End: 2024-01-04
Payer: COMMERCIAL

## 2024-01-04 VITALS
DIASTOLIC BLOOD PRESSURE: 70 MMHG | SYSTOLIC BLOOD PRESSURE: 122 MMHG | OXYGEN SATURATION: 100 % | HEART RATE: 111 BPM | TEMPERATURE: 97.9 F | RESPIRATION RATE: 18 BRPM

## 2024-01-04 DIAGNOSIS — U09.9 POST-COVID CHRONIC COUGH: ICD-10-CM

## 2024-01-04 DIAGNOSIS — R07.89 CHEST WALL TENDERNESS: Primary | ICD-10-CM

## 2024-01-04 DIAGNOSIS — R05.3 POST-COVID CHRONIC COUGH: ICD-10-CM

## 2024-01-04 LAB
ALBUMIN SERPL BCP-MCNC: 4.4 G/DL (ref 3.5–5)
ALP SERPL-CCNC: 76 U/L (ref 34–104)
ALT SERPL W P-5'-P-CCNC: 10 U/L (ref 7–52)
ANION GAP SERPL CALCULATED.3IONS-SCNC: 7 MMOL/L
AST SERPL W P-5'-P-CCNC: 13 U/L (ref 13–39)
ATRIAL RATE: 74 BPM
ATRIAL RATE: 81 BPM
BASOPHILS # BLD AUTO: 0.04 THOUSANDS/ÂΜL (ref 0–0.1)
BASOPHILS NFR BLD AUTO: 1 % (ref 0–1)
BILIRUB SERPL-MCNC: 0.26 MG/DL (ref 0.2–1)
BUN SERPL-MCNC: 11 MG/DL (ref 5–25)
CALCIUM SERPL-MCNC: 9.3 MG/DL (ref 8.4–10.2)
CARDIAC TROPONIN I PNL SERPL HS: <2 NG/L
CHLORIDE SERPL-SCNC: 103 MMOL/L (ref 96–108)
CO2 SERPL-SCNC: 27 MMOL/L (ref 21–32)
CREAT SERPL-MCNC: 0.76 MG/DL (ref 0.6–1.3)
EOSINOPHIL # BLD AUTO: 0.04 THOUSAND/ÂΜL (ref 0–0.61)
EOSINOPHIL NFR BLD AUTO: 1 % (ref 0–6)
ERYTHROCYTE [DISTWIDTH] IN BLOOD BY AUTOMATED COUNT: 12.3 % (ref 11.6–15.1)
GFR SERPL CREATININE-BSD FRML MDRD: 110 ML/MIN/1.73SQ M
GLUCOSE SERPL-MCNC: 88 MG/DL (ref 65–140)
HCT VFR BLD AUTO: 40.2 % (ref 34.8–46.1)
HGB BLD-MCNC: 12.9 G/DL (ref 11.5–15.4)
IMM GRANULOCYTES # BLD AUTO: 0.02 THOUSAND/UL (ref 0–0.2)
IMM GRANULOCYTES NFR BLD AUTO: 0 % (ref 0–2)
LYMPHOCYTES # BLD AUTO: 2.44 THOUSANDS/ÂΜL (ref 0.6–4.47)
LYMPHOCYTES NFR BLD AUTO: 29 % (ref 14–44)
MCH RBC QN AUTO: 28.4 PG (ref 26.8–34.3)
MCHC RBC AUTO-ENTMCNC: 32.1 G/DL (ref 31.4–37.4)
MCV RBC AUTO: 89 FL (ref 82–98)
MONOCYTES # BLD AUTO: 0.48 THOUSAND/ÂΜL (ref 0.17–1.22)
MONOCYTES NFR BLD AUTO: 6 % (ref 4–12)
NEUTROPHILS # BLD AUTO: 5.44 THOUSANDS/ÂΜL (ref 1.85–7.62)
NEUTS SEG NFR BLD AUTO: 63 % (ref 43–75)
NRBC BLD AUTO-RTO: 0 /100 WBCS
P AXIS: 14 DEGREES
P AXIS: 60 DEGREES
PLATELET # BLD AUTO: 260 THOUSANDS/UL (ref 149–390)
PMV BLD AUTO: 9.8 FL (ref 8.9–12.7)
POTASSIUM SERPL-SCNC: 3.7 MMOL/L (ref 3.5–5.3)
PR INTERVAL: 110 MS
PR INTERVAL: 138 MS
PROT SERPL-MCNC: 7.7 G/DL (ref 6.4–8.4)
QRS AXIS: 62 DEGREES
QRS AXIS: 77 DEGREES
QRSD INTERVAL: 82 MS
QRSD INTERVAL: 92 MS
QT INTERVAL: 370 MS
QT INTERVAL: 396 MS
QTC INTERVAL: 429 MS
QTC INTERVAL: 439 MS
RBC # BLD AUTO: 4.54 MILLION/UL (ref 3.81–5.12)
SODIUM SERPL-SCNC: 137 MMOL/L (ref 135–147)
T WAVE AXIS: 33 DEGREES
T WAVE AXIS: 55 DEGREES
VENTRICULAR RATE: 74 BPM
VENTRICULAR RATE: 81 BPM
WBC # BLD AUTO: 8.46 THOUSAND/UL (ref 4.31–10.16)

## 2024-01-04 PROCEDURE — 93005 ELECTROCARDIOGRAM TRACING: CPT

## 2024-01-04 PROCEDURE — 80053 COMPREHEN METABOLIC PANEL: CPT | Performed by: EMERGENCY MEDICINE

## 2024-01-04 PROCEDURE — 99284 EMERGENCY DEPT VISIT MOD MDM: CPT | Performed by: EMERGENCY MEDICINE

## 2024-01-04 PROCEDURE — 99285 EMERGENCY DEPT VISIT HI MDM: CPT

## 2024-01-04 PROCEDURE — 71045 X-RAY EXAM CHEST 1 VIEW: CPT

## 2024-01-04 PROCEDURE — 85025 COMPLETE CBC W/AUTO DIFF WBC: CPT | Performed by: EMERGENCY MEDICINE

## 2024-01-04 PROCEDURE — 84484 ASSAY OF TROPONIN QUANT: CPT | Performed by: EMERGENCY MEDICINE

## 2024-01-04 PROCEDURE — 36415 COLL VENOUS BLD VENIPUNCTURE: CPT

## 2024-01-04 NOTE — TELEPHONE ENCOUNTER
I spoke with patient and she advised she is having SOB and has pain in her back. Patient did confirm she does take birth control. Patient was advised to go to the ER to be evaluated to rule out PE. Patient was advised [facility]. Patient was in agreement.

## 2024-01-04 NOTE — Clinical Note
Grace Doherty was seen and treated in our emergency department on 1/4/2024.    No restrictions            Diagnosis:     Grace  may return to work on return date.    She may return on this date: 01/05/2024         If you have any questions or concerns, please don't hesitate to call.      Hammad Mcnair MD    ______________________________           _______________          _______________  Hospital Representative                              Date                                Time

## 2024-01-04 NOTE — TELEPHONE ENCOUNTER
Regarding: Racing heart and SOB  ----- Message from Tatianna Ruffin sent at 1/4/2024 10:43 AM EST -----  Patient was seen in ER on 1/2/2024. Is still having SOB throughout the day when standing and when going upstairs and heart is racing when going upstairs. Would like advice.

## 2024-01-04 NOTE — ED PROVIDER NOTES
History  Chief Complaint   Patient presents with    Chest Pain     Patient presents for chest pain, shortness of breath, and back pain. States her heart rate gets very higfh while exerting herself such as a BPM of 160 while walking up the stairs.      23-year-old female presents with multiple days of chest pain that she describes as lower anterior chest pain that radiates to the lateral anterior chest, with a postinfectious cough as she had COVID a few weeks ago, and a rapid heart rate that she says increases when she exerts herself like walking upstairs, and has noticed that it goes all the way up into the 150s.  Patient denies any fever, headache, dizziness, nausea, vomiting, shortness of breath, abdominal pain, and no urinary or GI complaints.  Patient reports the chest pain is worse with deep inspiration, patient drinks occasionally, smokes marijuana approximately 4 hours a day, does not smoke cigarettes or use any other drugs.        Prior to Admission Medications   Prescriptions Last Dose Informant Patient Reported? Taking?   ARIPiprazole (ABILIFY) 5 mg tablet   No No   Sig: Take 1 tablet (5 mg total) by mouth daily   DULoxetine (CYMBALTA) 60 mg delayed release capsule   No No   Sig: Take 90 mg (one 30 mg tablet and one 60 mg tablet) every morning for a total of 90 mg daily   DULoxetine (Cymbalta) 30 mg delayed release capsule   No No   Sig: Take 90 mg (one 30 mg tablet and one 60 mg tablet) every morning for a total of 90 mg daily   LORazepam (ATIVAN) 1 mg tablet  Self No No   Sig: Take 1 tablet (1 mg total) by mouth daily as needed for anxiety   albuterol (ProAir HFA) 90 mcg/act inhaler   No No   Sig: Inhale 2 puffs every 6 (six) hours as needed for wheezing   levonorgestrel-ethinyl estradiol (Marlissa) 0.15-30 MG-MCG per tablet   No No   Sig: Take 1 tablet by mouth daily   traZODone (DESYREL) 100 mg tablet   No No   Sig: TAKE 1 TABLET BY MOUTH DAILY AT BEDTIME      Facility-Administered Medications: None        Past Medical History:   Diagnosis Date    Anemia     Anxiety     Asthma     hx of as child - outgrew     Depression     Gastritis     Gastroparesis     GERD (gastroesophageal reflux disease)     Lyme disease     Migraine        Past Surgical History:   Procedure Laterality Date    ESOPHAGOGASTRODUODENOSCOPY      EGD with bipsy    UPPER GASTROINTESTINAL ENDOSCOPY         Family History   Problem Relation Age of Onset    Anxiety disorder Mother     Miscarriages / Stillbirths Mother     Bipolar disorder Father     Sleep disorder Father     Anxiety disorder Maternal Grandmother     Hyperlipidemia Maternal Grandmother     Hypertension Maternal Grandmother     Thyroid disease Maternal Grandmother     Ovarian cancer Maternal Grandmother     Miscarriages / Stillbirths Maternal Grandmother     Lung cancer Maternal Grandfather     Kidney disease Paternal Grandmother     Prostate cancer Paternal Grandfather     Skin cancer Paternal Grandfather     Breast cancer Maternal Aunt     Breast cancer Other     Substance Abuse Neg Hx      I have reviewed and agree with the history as documented.    E-Cigarette/Vaping    E-Cigarette Use Never User      E-Cigarette/Vaping Substances    Nicotine No     THC Yes medical    CBD No     Flavoring No     Other No     Unknown No      Social History     Tobacco Use    Smoking status: Never    Smokeless tobacco: Never   Vaping Use    Vaping status: Never Used   Substance Use Topics    Alcohol use: Yes     Alcohol/week: 2.0 standard drinks of alcohol     Types: 2 Standard drinks or equivalent per week     Comment: Socially    Drug use: Yes     Frequency: 7.0 times per week     Types: Marijuana     Comment: Medical Marijuana       Review of Systems   Constitutional:  Negative for fever.   HENT:  Negative for congestion.    Eyes:  Negative for visual disturbance.   Respiratory:  Negative for cough and shortness of breath.    Cardiovascular:  Positive for chest pain.   Gastrointestinal:   Negative for abdominal pain, constipation, diarrhea, nausea and vomiting.   Endocrine: Negative for polyuria.   Genitourinary:  Negative for dysuria and hematuria.   Musculoskeletal:  Negative for myalgias.   Neurological:  Negative for dizziness and headaches.       Physical Exam  Physical Exam  Vitals and nursing note reviewed.   Constitutional:       General: She is not in acute distress.     Appearance: Normal appearance. She is well-developed.   HENT:      Head: Normocephalic and atraumatic.   Eyes:      Extraocular Movements: Extraocular movements intact.      Conjunctiva/sclera: Conjunctivae normal.   Cardiovascular:      Rate and Rhythm: Normal rate and regular rhythm.   Pulmonary:      Effort: Pulmonary effort is normal. No tachypnea, accessory muscle usage or respiratory distress.      Breath sounds: Normal breath sounds.      Comments: Chest wall tenderness to palpation of her anterior chest wall which reproduces the pain that she has been experiencing.  Abdominal:      General: There is no distension.      Palpations: Abdomen is soft.      Tenderness: There is no abdominal tenderness.   Musculoskeletal:         General: No swelling.      Cervical back: Neck supple.   Skin:     General: Skin is warm and dry.      Capillary Refill: Capillary refill takes less than 2 seconds.   Neurological:      General: No focal deficit present.      Mental Status: She is alert and oriented to person, place, and time.   Psychiatric:         Mood and Affect: Mood normal.         Vital Signs  ED Triage Vitals [01/04/24 1408]   Temperature Pulse Respirations Blood Pressure SpO2   97.9 °F (36.6 °C) (!) 111 18 122/70 100 %      Temp Source Heart Rate Source Patient Position - Orthostatic VS BP Location FiO2 (%)   Oral Monitor Sitting Right arm --      Pain Score       --           Vitals:    01/04/24 1408   BP: 122/70   Pulse: (!) 111   Patient Position - Orthostatic VS: Sitting         Visual Acuity      ED  Medications  Medications - No data to display    Diagnostic Studies  Results Reviewed       Procedure Component Value Units Date/Time    Comprehensive metabolic panel [[id number]] Collected: 01/04/24 1414    Lab Status: Final result Specimen: Blood from Arm, Right Updated: 01/04/24 1457     Sodium 137 mmol/L      Potassium 3.7 mmol/L      Chloride 103 mmol/L      CO2 27 mmol/L      ANION GAP 7 mmol/L      BUN 11 mg/dL      Creatinine 0.76 mg/dL      Glucose 88 mg/dL      Calcium 9.3 mg/dL      AST 13 U/L      ALT 10 U/L      Alkaline Phosphatase 76 U/L      Total Protein 7.7 g/dL      Albumin 4.4 g/dL      Total Bilirubin 0.26 mg/dL      eGFR 110 ml/min/1.73sq m     Narrative:      National Kidney Disease Foundation guidelines for Chronic Kidney Disease (CKD):     Stage 1 with normal or high GFR (GFR > 90 mL/min/1.73 square meters)    Stage 2 Mild CKD (GFR = 60-89 mL/min/1.73 square meters)    Stage 3A Moderate CKD (GFR = 45-59 mL/min/1.73 square meters)    Stage 3B Moderate CKD (GFR = 30-44 mL/min/1.73 square meters)    Stage 4 Severe CKD (GFR = 15-29 mL/min/1.73 square meters)    Stage 5 End Stage CKD (GFR <15 mL/min/1.73 square meters)  Note: GFR calculation is accurate only with a steady state creatinine    HS Troponin 0hr (reflex protocol) [[id number]]  (Normal) Collected: 01/04/24 1414    Lab Status: Final result Specimen: Blood from Arm, Right Updated: 01/04/24 1457     hs TnI 0hr <2 ng/L     CBC and differential [[id number]] Collected: 01/04/24 1414    Lab Status: Final result Specimen: Blood from Arm, Right Updated: 01/04/24 1440     WBC 8.46 Thousand/uL      RBC 4.54 Million/uL      Hemoglobin 12.9 g/dL      Hematocrit 40.2 %      MCV 89 fL      MCH 28.4 pg      MCHC 32.1 g/dL      RDW 12.3 %      MPV 9.8 fL      Platelets 260 Thousands/uL      nRBC 0 /100 WBCs      Neutrophils Relative 63 %      Immat GRANS % 0 %      Lymphocytes Relative 29 %      Monocytes Relative 6 %      Eosinophils Relative 1 %       Basophils Relative 1 %      Neutrophils Absolute 5.44 Thousands/µL      Immature Grans Absolute 0.02 Thousand/uL      Lymphocytes Absolute 2.44 Thousands/µL      Monocytes Absolute 0.48 Thousand/µL      Eosinophils Absolute 0.04 Thousand/µL      Basophils Absolute 0.04 Thousands/µL                    XR chest 1 view portable    (Results Pending)              Procedures  ECG 12 Lead Documentation Only    Date/Time: 1/4/2024 3:26 PM    Performed by: Hammad Mcnair MD  Authorized by: Hammad Mcnair MD    ECG reviewed by me, the ED Provider: yes    Patient location:  ED  Previous ECG:     Previous ECG:  Unavailable    Comparison to cardiac monitor: Yes    Interpretation:     Interpretation: normal    Rate:     ECG rate:  81    ECG rate assessment: normal    Rhythm:     Rhythm: sinus rhythm    Ectopy:     Ectopy: none    QRS:     QRS axis:  Normal    QRS intervals:  Normal  Conduction:     Conduction: normal    ST segments:     ST segments:  Normal  T waves:     T waves: normal             ED Course  ED Course as of 01/04/24 1541   Thu Jan 04, 2024   1524 My wet read of the patient's chest x-ray is unremarkable with no consolidation or other pathology.           Medical Decision Making  23-year-old female presents with multiple days of chest pain that is reproducible and worse with deep inspiration, postinfectious cough, physical exam is unremarkable, and she does report she has had a recent heart but her EKG here is normal.  Patient's workup is unremarkable and she is safe for discharge home and will be encouraged to follow-up with cardiology as an outpatient.    Amount and/or Complexity of Data Reviewed  Labs: ordered.  Radiology: ordered.             Disposition  Final diagnoses:   Chest wall tenderness   Post-COVID chronic cough     Time reflects when diagnosis was documented in both MDM as applicable and the Disposition within this note       Time User Action Codes Description Comment     1/4/2024  3:34 PM Hammad Mcnair Add [R07.89] Chest wall tenderness     1/4/2024  3:34 PM Hammad Mcnair [R05.3,  U09.9] Post-COVID chronic cough           ED Disposition       ED Disposition   Discharge    Condition   Stable    Date/Time   Thu Jan 4, 2024  3:34 PM    Comment   Grace Doherty discharge to home/self care.                   Follow-up Information       Follow up With Specialties Details Why Contact Info Additional Information    Karla Szymanski MD Family Medicine Schedule an appointment as soon as possible for a visit in 3 days For follow-up [address] [facility] [address]  [phone]       [facility] Emergency Department Emergency Medicine Go to  As needed [facility] [facility] [address]  [phone] [facility] Emergency Department, [facility] [place], [place], [address]    [facility] Cardiology Schedule an appointment as soon as possible for a visit in 3 days For follow-up [address] [address]  [phone] [facility] [address], [address]   [phone]            Patient's Medications   Discharge Prescriptions    No medications on file           PDMP Review         Value Time User    PDMP Reviewed  Yes 12/29/2023  9:24 AM Shad Agrawal MD            ED Provider  Electronically Signed by             Hammad Mcnair MD  01/04/24 0571

## 2024-01-10 ENCOUNTER — OFFICE VISIT (OUTPATIENT)
Dept: FAMILY MEDICINE CLINIC | Facility: CLINIC | Age: 24
End: 2024-01-10
Payer: COMMERCIAL

## 2024-01-10 VITALS
HEIGHT: 65 IN | RESPIRATION RATE: 16 BRPM | DIASTOLIC BLOOD PRESSURE: 70 MMHG | HEART RATE: 108 BPM | OXYGEN SATURATION: 100 % | SYSTOLIC BLOOD PRESSURE: 130 MMHG | BODY MASS INDEX: 24.49 KG/M2 | WEIGHT: 147 LBS

## 2024-01-10 DIAGNOSIS — F31.81 BIPOLAR 2 DISORDER (HCC): ICD-10-CM

## 2024-01-10 DIAGNOSIS — R53.83 OTHER FATIGUE: ICD-10-CM

## 2024-01-10 DIAGNOSIS — R00.0 TACHYCARDIA: Primary | ICD-10-CM

## 2024-01-10 PROCEDURE — 99214 OFFICE O/P EST MOD 30 MIN: CPT | Performed by: FAMILY MEDICINE

## 2024-01-10 RX ORDER — METOPROLOL SUCCINATE 25 MG/1
25 TABLET, EXTENDED RELEASE ORAL DAILY
Qty: 30 TABLET | Refills: 0 | Status: SHIPPED | OUTPATIENT
Start: 2024-01-10

## 2024-01-10 NOTE — PROGRESS NOTES
Name: Grace Doherty      : 2000      MRN: [id number]  Encounter Provider: Karla Szymanski MD  Encounter Date: 1/10/2024   Encounter department: [facility] [place]    Assessment & Plan     1. Tachycardia  Assessment & Plan:  Since COVID 3 weeks ago she is having some anxiety and heart racing kind of feeling, seen in the ER twice, labs chest x-ray EKG was normal she will make an appointment with cardiologist, I will start her on metoprolol 25 mg daily and recheck in 2 weeks    Orders:  -     metoprolol succinate (TOPROL-XL) 25 mg 24 hr tablet; Take 1 tablet (25 mg total) by mouth daily    2. Bipolar 2 disorder (HCC)    3. Other fatigue           Subjective     Had covid 3 wks ago , more fatigue and get sob easily on exertion and heart racing on exertion , she says all symptoms started after she had COVID, she was seen in the ER twice a chest x-ray is normal, EKG was normal, she will make an appointment with cardiologist, denies any fever or chills, she has a lot of anxiety and bipolar and taking medication from psychiatrist and use Ativan once a week      Review of Systems   Constitutional: Negative.    HENT: Negative.     Eyes: Negative.    Respiratory: Negative.     Cardiovascular:  Positive for palpitations.   Musculoskeletal: Negative.    Skin: Negative.    Psychiatric/Behavioral: Negative.         Past Medical History:   Diagnosis Date   • Anemia    • Anxiety    • Asthma     hx of as child - outgrew    • Depression    • Gastritis    • Gastroparesis    • GERD (gastroesophageal reflux disease)    • Lyme disease    • Migraine      Past Surgical History:   Procedure Laterality Date   • ESOPHAGOGASTRODUODENOSCOPY      EGD with bipsy   • UPPER GASTROINTESTINAL ENDOSCOPY       Family History   Problem Relation Age of Onset   • Anxiety disorder Mother    • Miscarriages / Stillbirths Mother    • Bipolar disorder Father    • Sleep disorder Father    • Anxiety disorder Maternal Grandmother    •  Hyperlipidemia Maternal Grandmother    • Hypertension Maternal Grandmother    • Thyroid disease Maternal Grandmother    • Ovarian cancer Maternal Grandmother    • Miscarriages / Stillbirths Maternal Grandmother    • Lung cancer Maternal Grandfather    • Kidney disease Paternal Grandmother    • Prostate cancer Paternal Grandfather    • Skin cancer Paternal Grandfather    • Breast cancer Maternal Aunt    • Breast cancer Other    • Substance Abuse Neg Hx      Social History     Socioeconomic History   • Marital status: Single     Spouse name: None   • Number of children: None   • Years of education: None   • Highest education level: None   Occupational History   • None   Tobacco Use   • Smoking status: Never   • Smokeless tobacco: Never   Vaping Use   • Vaping status: Never Used   Substance and Sexual Activity   • Alcohol use: Yes     Alcohol/week: 2.0 standard drinks of alcohol     Types: 2 Standard drinks or equivalent per week     Comment: Socially   • Drug use: Yes     Frequency: 7.0 times per week     Types: Marijuana     Comment: Medical Marijuana   • Sexual activity: Yes     Partners: Male     Birth control/protection: OCP   Other Topics Concern   • None   Social History Narrative    Caffeine use    Currently in college    Poor dental hygiene    Tea      Social Determinants of Health     Financial Resource Strain: Not on file   Food Insecurity: Not on file   Transportation Needs: Not on file   Physical Activity: Not on file   Stress: Not on file   Social Connections: Not on file   Intimate Partner Violence: Not on file   Housing Stability: Not on file     Current Outpatient Medications on File Prior to Visit   Medication Sig   • albuterol (ProAir HFA) 90 mcg/act inhaler Inhale 2 puffs every 6 (six) hours as needed for wheezing   • ARIPiprazole (ABILIFY) 5 mg tablet Take 1 tablet (5 mg total) by mouth daily   • DULoxetine (Cymbalta) 30 mg delayed release capsule Take 90 mg (one 30 mg tablet and one 60 mg tablet)  "every morning for a total of 90 mg daily   • DULoxetine (CYMBALTA) 60 mg delayed release capsule Take 90 mg (one 30 mg tablet and one 60 mg tablet) every morning for a total of 90 mg daily   • levonorgestrel-ethinyl estradiol (Marlissa) 0.15-30 MG-MCG per tablet Take 1 tablet by mouth daily   • LORazepam (ATIVAN) 1 mg tablet Take 1 tablet (1 mg total) by mouth daily as needed for anxiety   • traZODone (DESYREL) 100 mg tablet TAKE 1 TABLET BY MOUTH DAILY AT BEDTIME     Allergies   Allergen Reactions   • Pineapple - Food Allergy Anaphylaxis   • Penicillins Hives and Vomiting     Immunization History   Administered Date(s) Administered   • COVID-19 Pfizer Vac BIVALENT Gabriele-sucrose 12 Yr+ IM 12/10/2022   • DTP 2000, 2000, 01/18/2000, 03/12/2001, 08/25/2004   • HPV9 12/11/2018, 06/18/2020, 06/29/2021   • Hep B, adult 2000, 2000, 12/01/2008   • Hib (PRP-OMP) 2000, 2000, 12/01/2008   • INFLUENZA 01/10/2021, 12/10/2022   • IPV 2000, 2000, 03/12/2001, 08/24/2004   • MMR 2000, 11/06/2000, 08/25/2004, 12/01/2008   • Meningococcal, Unknown Serogroups 04/10/2012, 10/13/2015   • Tdap 04/10/2012, 10/13/2015   • Tuberculin Skin Test-PPD Intradermal 10/13/2015   • Varicella 08/25/2004, 03/10/2011       Objective     /70   Pulse (!) 108   Resp 16   Ht 5' 5\" (1.651 m)   Wt 66.7 kg (147 lb)   LMP 12/28/2023 (Approximate)   SpO2 100%   BMI 24.46 kg/m²     Physical Exam  Vitals and nursing note reviewed.   Constitutional:       Appearance: Normal appearance. She is well-developed. She is not ill-appearing.   Eyes:      Extraocular Movements: Extraocular movements intact.   Neck:      Thyroid: No thyromegaly.   Cardiovascular:      Rate and Rhythm: Regular rhythm. Tachycardia present.      Heart sounds: Normal heart sounds. No murmur heard.  Pulmonary:      Effort: Pulmonary effort is normal.      Breath sounds: Normal breath sounds. No wheezing or rales. "   Musculoskeletal:      Cervical back: Normal range of motion and neck supple.      Right lower leg: No edema.      Left lower leg: No edema.   Lymphadenopathy:      Cervical: No cervical adenopathy.   Skin:     Findings: No erythema or rash.   Neurological:      Mental Status: She is alert.   Psychiatric:         Mood and Affect: Mood normal.       Karla Szymanski MD

## 2024-01-10 NOTE — ASSESSMENT & PLAN NOTE
Since COVID 3 weeks ago she is having some anxiety and heart racing kind of feeling, seen in the ER twice, labs chest x-ray EKG was normal she will make an appointment with cardiologist, I will start her on metoprolol 25 mg daily and recheck in 2 weeks

## 2024-02-01 DIAGNOSIS — R00.0 TACHYCARDIA: ICD-10-CM

## 2024-02-01 RX ORDER — METOPROLOL SUCCINATE 25 MG/1
25 TABLET, EXTENDED RELEASE ORAL DAILY
Qty: 90 TABLET | Refills: 1 | Status: SHIPPED | OUTPATIENT
Start: 2024-02-01

## 2024-02-05 ENCOUNTER — ANNUAL EXAM (OUTPATIENT)
Dept: OBGYN CLINIC | Facility: CLINIC | Age: 24
End: 2024-02-05
Payer: COMMERCIAL

## 2024-02-05 VITALS
WEIGHT: 154.4 LBS | BODY MASS INDEX: 25.72 KG/M2 | DIASTOLIC BLOOD PRESSURE: 80 MMHG | SYSTOLIC BLOOD PRESSURE: 130 MMHG | HEIGHT: 65 IN

## 2024-02-05 DIAGNOSIS — Z12.4 SCREENING FOR CERVICAL CANCER: ICD-10-CM

## 2024-02-05 DIAGNOSIS — Z01.419 ENCOUNTER FOR GYNECOLOGICAL EXAMINATION (GENERAL) (ROUTINE) WITHOUT ABNORMAL FINDINGS: ICD-10-CM

## 2024-02-05 PROCEDURE — G0124 SCREEN C/V THIN LAYER BY MD: HCPCS | Performed by: STUDENT IN AN ORGANIZED HEALTH CARE EDUCATION/TRAINING PROGRAM

## 2024-02-05 PROCEDURE — G0145 SCR C/V CYTO,THINLAYER,RESCR: HCPCS | Performed by: STUDENT IN AN ORGANIZED HEALTH CARE EDUCATION/TRAINING PROGRAM

## 2024-02-05 PROCEDURE — 99395 PREV VISIT EST AGE 18-39: CPT | Performed by: PHYSICIAN ASSISTANT

## 2024-02-05 NOTE — PROGRESS NOTES
ASSESSMENT & PLAN: Grace Doherty is a 23 y.o.  with normal gynecologic exam.    1.  Routine well woman exam done today  2.  Pap and HPV:  The patient's last pap was .    It was normal.    Pap was done today.    Current ASCCP Guidelines reviewed.   3.  STD testing was offered and declined today.  4.  Gardasil vaccine series completed  5. The following were reviewed in today's visit: breast self exam, STD testing, use and side effects of OCPs, adequate intake of calcium and vitamin D, exercise, healthy diet, and age-appropriate recommendation regarding screenings and prevention.  6.  Patient will be seeing cardiology secondary to chest pain and shortness of breath since COVID in December.  Patient states overall symptoms have significantly subsided but still with some symptoms reproduced by exertion.  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  She has been taking OCP Marlissa times many years now without issue.  Blood pressure borderline today 130/80.  No acute cardiovascular symptoms.  Will appreciate input from cardiology and cardiovascular workup.  Can consider switching OCP if needed.    RTO 1 year annual, sooner problems arise in the interim or close follow-up as needed.    CC:  Annual Gynecologic Examination    HPI: Grace Doherty is a 23 y.o.  who presents for annual gynecologic examination.      She has the following concerns:  none.   She had some bleeding middle of cycle 1-2 times, but has since resolved states was on abx and medication for yeast. She also may have missed some pills or taken them late. Also had covid 2 months ago.    States she had covid and was having chest pain. States they were not sure if from covid vs from the BC. States willl be seeing a cardio specialist for that. Sxs mostly resolved sometimes will have CP with exertion, sometimes SOB with it. Did not have prior to covid in December. Hx of sports induced asthma.     She has been on birth  control times years and has never had an issue prior.    Healthy diet Yes; she is starting to track eating and water intake.   Exercise Yes; yoga  Vitamins No    Patient's last menstrual period was 2024 (approximate).    Menses frequency: once a month w/ placebo  Length of bleedin-5 days  Bleeding quality: average  Sxs with menses: mild cramping, HA's cyclically.       Health Maintenance:      She does not perform regular monthly self breast exams.  Denies breast pain, lump, skin change or nipple discharge.    She feels safe at home. Feels safe in relationship with partner.     Past Medical History:   Diagnosis Date    Anemia     Anxiety     Asthma     hx of as child - outgrew     Depression     Gastritis     Gastroparesis     GERD (gastroesophageal reflux disease)     Lyme disease     Migraine        Past Surgical History:   Procedure Laterality Date    ESOPHAGOGASTRODUODENOSCOPY      EGD with bipsy    UPPER GASTROINTESTINAL ENDOSCOPY         OB/Gyn History:    Pt does not have menstrual issues.     History of sexually transmitted infection: No.  History of abnormal pap smears: denies .    Patient is currently sexually active.  Current monogamous relationship.  The current method of family planning is OCP (estrogen/progesterone).    OB History          0    Para   0    Term   0       0    AB   0    Living   0         SAB   0    IAB   0    Ectopic   0    Multiple   0    Live Births   0           Obstetric Comments   Menarche 12               Family History   Problem Relation Age of Onset    Anxiety disorder Mother     Miscarriages / Stillbirths Mother     Bipolar disorder Father     Sleep disorder Father     Anxiety disorder Maternal Grandmother     Hyperlipidemia Maternal Grandmother     Hypertension Maternal Grandmother     Thyroid disease Maternal Grandmother     Ovarian cancer Maternal Grandmother     Miscarriages / Stillbirths Maternal Grandmother     Lung cancer Maternal Grandfather      Kidney disease Paternal Grandmother     Prostate cancer Paternal Grandfather     Skin cancer Paternal Grandfather     Breast cancer Maternal Aunt     Breast cancer Other     Substance Abuse Neg Hx        Family history of Breast/Uterine/Ovarian/Colon Cancer: as above    Social History:  Social History     Socioeconomic History    Marital status: Single     Spouse name: Not on file    Number of children: Not on file    Years of education: Not on file    Highest education level: Not on file   Occupational History    Not on file   Tobacco Use    Smoking status: Never    Smokeless tobacco: Never   Vaping Use    Vaping status: Never Used   Substance and Sexual Activity    Alcohol use: Yes     Alcohol/week: 2.0 standard drinks of alcohol     Types: 2 Standard drinks or equivalent per week     Comment: Socially    Drug use: Yes     Frequency: 7.0 times per week     Types: Marijuana     Comment: Medical Marijuana    Sexual activity: Yes     Partners: Male     Birth control/protection: OCP   Other Topics Concern    Not on file   Social History Narrative    Caffeine use    Currently in college    Poor dental hygiene    Tea      Social Determinants of Health     Financial Resource Strain: Not on file   Food Insecurity: Not on file   Transportation Needs: Not on file   Physical Activity: Not on file   Stress: Not on file   Social Connections: Not on file   Intimate Partner Violence: Not on file   Housing Stability: Not on file         Allergies   Allergen Reactions    Pineapple - Food Allergy Anaphylaxis    Penicillins Hives and Vomiting         Current Outpatient Medications:     albuterol (ProAir HFA) 90 mcg/act inhaler, Inhale 2 puffs every 6 (six) hours as needed for wheezing, Disp: 8.5 g, Rfl: 0    ARIPiprazole (ABILIFY) 5 mg tablet, Take 1 tablet (5 mg total) by mouth daily, Disp: 90 tablet, Rfl: 0    DULoxetine (Cymbalta) 30 mg delayed release capsule, Take 90 mg (one 30 mg tablet and one 60 mg tablet) every morning for  "a total of 90 mg daily, Disp: 30 capsule, Rfl: 2    DULoxetine (CYMBALTA) 60 mg delayed release capsule, Take 90 mg (one 30 mg tablet and one 60 mg tablet) every morning for a total of 90 mg daily, Disp: 30 capsule, Rfl: 2    levonorgestrel-ethinyl estradiol (Marlissa) 0.15-30 MG-MCG per tablet, Take 1 tablet by mouth daily, Disp: 84 tablet, Rfl: 4    LORazepam (ATIVAN) 1 mg tablet, Take 1 tablet (1 mg total) by mouth daily as needed for anxiety, Disp: 30 tablet, Rfl: 2    traZODone (DESYREL) 100 mg tablet, TAKE 1 TABLET BY MOUTH DAILY AT BEDTIME, Disp: 90 tablet, Rfl: 1    metoprolol succinate (TOPROL-XL) 25 mg 24 hr tablet, TAKE 1 TABLET (25 MG TOTAL) BY MOUTH DAILY., Disp: 90 tablet, Rfl: 1    Review of Systems:  Constitutional :no fever, feels well, no tiredness, no recent weight gain or loss  ENT: no ear ache, no loss of hearing, no nosebleeds or nasal discharge, no sore throat or hoarseness.  Cardiovascular: Reported chest pain with exertion post-COVID.  Sometimes faster heartbeat.  no palpitations, no leg claudication or lower extremity edema.  Respiratory: Some dyspnea with exertion as in HPI s/p COVID.    Breasts:no complaints of breast pain, breast lump, or nipple discharge  Gastrointestinal: no complaints of abdominal pain, constipation, nausea, vomiting, or diarrhea or bloody stools  Genitourinary : no complaints of dysuria, incontinence, pelvic pain, no dysmenorrhea, vaginal discharge or abnormal vaginal bleeding and as noted in HPI.  Musculoskeletal: no complaints of arthralgia, no myalgia, no joint swelling or stiffness, no limb pain or swelling.  Integumentary: no complaints of skin rash or lesion, itching or dry skin  Neurological: no complaints of headache, no confusion, no numbness or tingling, no dizziness or fainting  Mental Health: Anxiety and depression, stable    Objective      /80 (BP Location: Left arm, Patient Position: Sitting, Cuff Size: Adult)   Ht 5' 5\" (1.651 m)   Wt 70 kg " (154 lb 6.4 oz)   LMP 01/26/2024 (Approximate)   BMI 25.69 kg/m²     General:   appears stated age, cooperative, alert normal mood and affect.  BMI 25.69   Neck: normal, supple,trachea midline, no masses.  Thyroid palpated normal.   Heart: regular rate and rhythm, S1, S2 normal, no murmur, click, rub or gallop   Lungs: clear to auscultation bilaterally   Breasts: normal appearance, no masses or tenderness, No nipple retraction or dimpling, No nipple discharge or bleeding, No axillary or supraclavicular adenopathy, Normal to palpation without dominant masses   Abdomen: soft, non-tender, without masses or organomegaly   Vulva: normal female genitalia, no lesions   Vagina: normal vagina, no discharge, exudate, lesion, or erythema   Urethra: normal   Cervix: Normal, no discharge. PAP done. Nontender.   Uterus: normal size, contour, position, consistency, mobility, non-tender   Adnexa: no mass, fullness, tenderness   Lymphatic palpation of lymph nodes in neck, axilla, groin and/or other locations: no lymphadenopathy or masses noted   Skin normal skin turgor and no rashes.   Psychiatric orientation to person, place, and time: normal. mood and affect: normal

## 2024-02-08 ENCOUNTER — APPOINTMENT (OUTPATIENT)
Dept: LAB | Facility: CLINIC | Age: 24
End: 2024-02-08
Payer: COMMERCIAL

## 2024-02-08 DIAGNOSIS — Z13.0 SCREENING FOR ENDOCRINE, NUTRITIONAL, METABOLIC AND IMMUNITY DISORDER: ICD-10-CM

## 2024-02-08 DIAGNOSIS — Z13.228 SCREENING FOR ENDOCRINE, NUTRITIONAL, METABOLIC AND IMMUNITY DISORDER: ICD-10-CM

## 2024-02-08 DIAGNOSIS — Z13.29 SCREENING FOR ENDOCRINE, NUTRITIONAL, METABOLIC AND IMMUNITY DISORDER: ICD-10-CM

## 2024-02-08 DIAGNOSIS — Z13.21 SCREENING FOR ENDOCRINE, NUTRITIONAL, METABOLIC AND IMMUNITY DISORDER: ICD-10-CM

## 2024-02-08 LAB — 25(OH)D3 SERPL-MCNC: 27.4 NG/ML (ref 30–100)

## 2024-02-08 PROCEDURE — 36415 COLL VENOUS BLD VENIPUNCTURE: CPT

## 2024-02-08 PROCEDURE — 82306 VITAMIN D 25 HYDROXY: CPT

## 2024-02-12 LAB
LAB AP GYN PRIMARY INTERPRETATION: ABNORMAL
Lab: ABNORMAL
PATH INTERP SPEC-IMP: ABNORMAL

## 2024-02-14 ENCOUNTER — TELEPHONE (OUTPATIENT)
Dept: CARDIOLOGY CLINIC | Facility: CLINIC | Age: 24
End: 2024-02-14

## 2024-02-15 NOTE — PSYCH
Virtual Visit Disclaimer & Required Documentation  TeleMed provider: Shad Agrawal MD. located at [facility], [address] [place] [address] in [place], [place], [facility]. The patient is also located in [place] which is the state in which I hold an active license.    The patient was identified by name and date of birth. Grace Doherty was informed that this is a telemedicine visit and that the visit is being conducted through [employer] and patient was informed this is a secure, HIPAA-complaint platform. She agrees to proceed. My office door was closed. No one else was in the room. She acknowledged consent and understanding of privacy and security of the video platform. The patient has agreed to participate and understands they can discontinue the visit at any time.    Grace Doherty verbally agrees to participate in Virtual Care Services. Pt is aware that Virtual Care Services could be limited without vital signs or the ability to perform a full hands-on physical exam. The patient understands she or the provider may request at any time to terminate the video visit and request the patient to seek care or treatment in person. Patient is aware this is a billable service.        Psychiatric Follow Up Visit - Behavioral Health   MRN: [id number]  Visit Time  Start: 0830. Stop: 0848. Total: 18 minutes.    Assessment/Plan   Grace Doherty is a 23 y.o. female, Single with prior psychiatric diagnoses of anxiety, depression, insomnia (rule out bipolar), no past psychiatric hospitalizations, no past suicide attempts, h/o self-injurious behaviors (cutting), no h/o physical altercations, PMH significant for h/o lyme disease, gastritis, h/o esophageal candidiasis, daily medical marijuana use, and history of heavy alcohol use in college; with suicide risk factors including history of self-harm as recently as 2022 jan, chronic mental illness, medical problems, chronic pain, financial problems, anxiety, impulsivity, substance abuse  and never .      In the setting of relative stability on current psychotropic medication regiment after the recent increase back to Cymbalta 90 mg daily for anxiety, depression, and mood stability along with slightly worsening psychosocial stressors that she feels well resolved in the coming weeks, she is agreeable with continuing the medication regimen as prescribed and following up in 3 months for further medication management optimization.  She will continue working on cutting back marijuana use and is agreeable with consideration of medication assisted therapy for cutting back such as gabapentin at the follow-up visit.    Patient was informed of the adverse effects of cannabis use/abuse on their physical health including: respiratory disease in addition to diminished pulmonary functioning, hypertension, heart disease including arrhythmias and/or myocardial infarction, stroke, weakening of the immune system, attention and concentration problems, behavioral problems like substance induced mood disorders and/or psychosis, increased instances of multiple malignancies possibly including the lungs, head/neck and testicles/ovaries including diminished sexual functioning in addition to problematic reproduction, hyperemesis syndrome and interactions with an individual's medication regimen in addition to risk of intoxication, particularly in the presence of sedative agents like alcohol and/or benzodiazepines. Patient was informed of the possible withdrawal syndrome associated to prevalent and persistent use of cannabis including: headache, rigors, tremulousness, anxiety, anger, irritability, insomnia, fatigue, diminished appetite including possible unintentional decrease in weight and gastrointestinal upset including nausea, vomiting and diarrhea. This writer recommended abstinence from cannabis use/abuse in the presence of the aforementioned effects. Patient was receptive to the recommendations of this  writer.      Diagnosis:   Major depressive disorder, recurrent episode, mild - not at goal, improved  Generalized anxiety disorder - not at goal, improved  Insomnia, unspecified- at goal  Cannabis abuse, daily use - at goal     Treatment Recommendations/Precautions:  Initiate vitamin D 1000 units for 60 days for low vitamin D level  At follow-up visit consider initiation of gabapentin to aid in patient's attempts to cut back on marijuana usage as she is not able to cold turkey in the next 3 months.  Currently smoking 4 hours a day on average  In past caused drowsiness and she found it hard to remember doses, but she is agreeable with consideration of reinitiating a low-dose.  Continue Abilify 5 mg daily for mood stability, as augmentation for depression  Abilify was increased to 7.5 mg while at PHP resulted in restlessness, possible akathisia  Continue Cymbalta 90 mg qAM for anxiety, depression, mood stability  Trial of lower dose of 60 mg resulted in worsening of anxiety and depression  Continue trazodone 100 mg q.h.s. for insomnia, depression, generalized anxiety  Continue melatonin to 5mg qHS for insomnia  Continue Ativan 1 mg daily for JADEN and panic attack prevention  Has been using 1 pill every week for panic attack prevention      Therapy:  Referral for individual psychotherapy; pending.    Medical:   Follow up with primary care physician for ongoing medical care   Labs:   Vitamin D level low at 27.4; replete vitamin D moving forward with 1,000 units daily for 2 months followed by repeating the lab.   Most recently obtained 1/2/2024, reviewed        Treatment Plan:  The Treatment Plan was previously completed and not due prior to the next visit.. The next plan will be due November 11, 2023.        Treatment Plan:  The next plan will be due 6/28/2024.    -------------------------------------------------------    History of Present Illness   Grace Doherty is a 23 y.o. female, Single; with prior psychiatric  "diagnoses of anxiety, depression, insomnia (rule out bipolar), no past psychiatric hospitalizations, no past suicide attempts, no  h/o self-injurious behaviors (cutting), no h/o physical altercations, PMH significant for h/o lyme disease, gastritis, h/o esophageal candidiasis, daily medical marijuana use, and history of heavy alcohol use in college; with suicide risk factors including history of self-harm as recently as 2022 jan, chronic mental illness, medical problems, chronic pain, financial problems, anxiety, impulsivity, substance abuse and never ; now presenting to follow up for anxiety, depression and insomnia.      At last visit, she had endorsed worsening anxiety, depression, and insomnia since decreasing her Cymbalta from 90 mg daily to 60 mg daily, so it was decided to bring her dose back up to 90 mg daily.    Patient had several recent episodes on 1/2/2024 and 1/4/2024 of atypical chest pain and shortness of breath for which she went to the ED.  She did have COVID 15 days prior to these visits.  Will go to cardiologist for w/u for inc HR.    Today, Grace reports that she has slightly heightened psychosocial stressors in the setting of grandmother who recently got sick and difficulties at home with family members who are sad about this.  Her anxiety and depression are elevated, anxiety being at baseline and depression slightly high but she reports this is due to adjusting after grandmother's recent hospital stay.  Denies any pain and understands the importance moving forward of discontinuing cannabis completely, which she is slowly working at.  Denies any SI, HI, AVH, alcohol use, akathisia, or any other psychosocial stressors.    Anxiety: 4/10, baseline she says  Depression: 7/10, high because increased stress in the house.  Grandmother went to the hospital for \"aorta tear\".  Family is hiding their emotions.   Grace  Stressors: increased  Sleep: good  Cannabis: At recent ED visit, she had reported " smoking for around 4 hours a day.  Increased cannabis use due to stress, now trying to cut back. Measuring out and trying to decrease. Denies alcohol use, feels MJ is   AE's: denies  job: interview around, no current job, in school now for masters  Blood pressures: Recent Bps have been stable      Psychiatric Review Of Systems:  Grace reports Symptoms as described in HPI.  Grace denies Current suicidal thoughts, plan, or intent, Current thoughts of self-harm, or Current homicidal thoughts, plan, or intent.    Medical Review Of Systems:  Complete review of systems is negative except as noted above.    ------------------------------------  Past Medical History:   Diagnosis Date    Anemia     Anxiety     Asthma     hx of as child - outgrew     Depression     Gastritis     Gastroparesis     GERD (gastroesophageal reflux disease)     Lyme disease     Migraine       Past Surgical History:   Procedure Laterality Date    ESOPHAGOGASTRODUODENOSCOPY      EGD with bipsy    UPPER GASTROINTESTINAL ENDOSCOPY         Visit Vitals  LMP 01/26/2024 (Approximate)   OB Status Having periods   Smoking Status Never      Wt Readings from Last 6 Encounters:   02/05/24 70 kg (154 lb 6.4 oz)   01/10/24 66.7 kg (147 lb)   01/02/24 66.7 kg (147 lb)   12/27/23 68.9 kg (152 lb)   11/01/23 67.1 kg (148 lb)   10/27/23 67.6 kg (149 lb)        Mental Status Exam:  Appearance:  alert, good eye contact, appears stated age, casually dressed, and appropriate grooming and hygiene   Behavior:  calm and cooperative   Motor: no abnormal movements and normal gait and balance   Speech:  spontaneous and coherent   Mood:  dysphoric and anxious   Affect:  constricted   Thought Process:  Organized, logical, goal-directed   Thought Content: no verbalized delusions or overt paranoia   Perceptual disturbances: no reported hallucinations and does not appear to be responding to internal stimuli at this time   Risk Potential: No active or passive suicidal or homicidal  ideation was verbalized during interview   Cognition: oriented to self and situation, appears to be of average intelligence, and cognition not formally tested   Insight:  Good   Judgment: Fair       Meds/Allergies    Allergies   Allergen Reactions    Pineapple - Food Allergy Anaphylaxis    Penicillins Hives and Vomiting     Current Outpatient Medications   Medication Instructions    albuterol (ProAir HFA) 90 mcg/act inhaler 2 puffs, Inhalation, Every 6 hours PRN    ARIPiprazole (ABILIFY) 5 mg, Oral, Daily    DULoxetine (Cymbalta) 30 mg delayed release capsule Take 90 mg (one 30 mg tablet and one 60 mg tablet) every morning for a total of 90 mg daily    DULoxetine (CYMBALTA) 60 mg delayed release capsule Take 90 mg (one 30 mg tablet and one 60 mg tablet) every morning for a total of 90 mg daily    levonorgestrel-ethinyl estradiol (Marlissa) 0.15-30 MG-MCG per tablet 1 tablet, Oral, Daily    LORazepam (ATIVAN) 1 mg, Oral, Daily PRN    metoprolol succinate (TOPROL-XL) 25 mg, Oral, Daily    traZODone (DESYREL) 100 mg, Oral, Daily at bedtime        Labs & Imaging:  I have personally reviewed all pertinent laboratory tests and imaging results.   Appointment on 02/08/2024   Component Date Value Ref Range Status    Vit D, 25-Hydroxy 02/08/2024 27.4 (L)  30.0 - 100.0 ng/mL Final    Vitamin D guidelines established by Clinical Guidelines Subcommittee  of the Endocrine Society Task Force, 2011    Deficiency <20ng/ml   Insufficiency 20-30ng/ml   Sufficient  ng/ml    Annual Exam on 02/05/2024   Component Date Value Ref Range Status    Case Report 02/05/2024    Final                    Value:Gynecologic Cytology Report                       Case: [id number]                                  Authorizing Provider:  Keli Delgado PA-C    Collected:           02/05/2024 0926              Ordering Location:     [facility]    Received:            02/05/2024 0926                                     [facility]                                                             First Screen:          [place], [place]                                                         Pathologist:           Beth Arriaza DO                                                           Specimen:    LIQUID-BASED PAP, SCREENING, Cervix, Endocervical                                          Primary Interpretation 02/05/2024 Epithelial cell abnormality   Final    Interpretation 02/05/2024 Atypical squamous cells, cannot exclude HSIL   Final    Specimen Adequacy 02/05/2024 Satisfactory for evaluation. Endocervical/transformation zone component present.   Final    Additional Information 02/05/2024    Final                    Value:This result contains rich text formatting which cannot be displayed here.   Admission on 01/04/2024, Discharged on 01/04/2024   Component Date Value Ref Range Status    WBC 01/04/2024 8.46  4.31 - 10.16 Thousand/uL Final    RBC 01/04/2024 4.54  3.81 - 5.12 Million/uL Final    Hemoglobin 01/04/2024 12.9  11.5 - 15.4 g/dL Final    Hematocrit 01/04/2024 40.2  34.8 - 46.1 % Final    MCV 01/04/2024 89  82 - 98 fL Final    MCH 01/04/2024 28.4  26.8 - 34.3 pg Final    MCHC 01/04/2024 32.1  31.4 - 37.4 g/dL Final    RDW 01/04/2024 12.3  11.6 - 15.1 % Final    MPV 01/04/2024 9.8  8.9 - 12.7 fL Final    Platelets 01/04/2024 260  149 - 390 Thousands/uL Final    nRBC 01/04/2024 0  /100 WBCs Final    Neutrophils Relative 01/04/2024 63  43 - 75 % Final    Immat GRANS % 01/04/2024 0  0 - 2 % Final    Lymphocytes Relative 01/04/2024 29  14 - 44 % Final    Monocytes Relative 01/04/2024 6  4 - 12 % Final    Eosinophils Relative 01/04/2024 1  0 - 6 % Final    Basophils Relative 01/04/2024 1  0 - 1 % Final    Neutrophils Absolute 01/04/2024 5.44  1.85 - 7.62 Thousands/µL Final    Immature Grans Absolute 01/04/2024 0.02  0.00 - 0.20 Thousand/uL Final    Lymphocytes Absolute 01/04/2024 2.44  0.60 - 4.47 Thousands/µL Final    Monocytes  "Absolute 01/04/2024 0.48  0.17 - 1.22 Thousand/µL Final    Eosinophils Absolute 01/04/2024 0.04  0.00 - 0.61 Thousand/µL Final    Basophils Absolute 01/04/2024 0.04  0.00 - 0.10 Thousands/µL Final    Sodium 01/04/2024 137  135 - 147 mmol/L Final    Potassium 01/04/2024 3.7  3.5 - 5.3 mmol/L Final    Chloride 01/04/2024 103  96 - 108 mmol/L Final    CO2 01/04/2024 27  21 - 32 mmol/L Final    ANION GAP 01/04/2024 7  mmol/L Final    BUN 01/04/2024 11  5 - 25 mg/dL Final    Creatinine 01/04/2024 0.76  0.60 - 1.30 mg/dL Final    Standardized to IDMS reference method    Glucose 01/04/2024 88  65 - 140 mg/dL Final    If the patient is fasting, the ADA then defines impaired fasting glucose as > 100 mg/dL and diabetes as > or equal to 123 mg/dL.    Calcium 01/04/2024 9.3  8.4 - 10.2 mg/dL Final    AST 01/04/2024 13  13 - 39 U/L Final    ALT 01/04/2024 10  7 - 52 U/L Final    Specimen collection should occur prior to Sulfasalazine administration due to the potential for falsely depressed results.     Alkaline Phosphatase 01/04/2024 76  34 - 104 U/L Final    Total Protein 01/04/2024 7.7  6.4 - 8.4 g/dL Final    Albumin 01/04/2024 4.4  3.5 - 5.0 g/dL Final    Total Bilirubin 01/04/2024 0.26  0.20 - 1.00 mg/dL Final    Use of this assay is not recommended for patients undergoing treatment with eltrombopag due to the potential for falsely elevated results.  N-acetyl-p-benzoquinone imine (metabolite of Acetaminophen) will generate erroneously low results in samples for patients that have taken an overdose of Acetaminophen.    eGFR 01/04/2024 110  ml/min/1.73sq m Final    hs TnI 0hr 01/04/2024 <2  \"Refer to ACS Flowchart\"- see link ng/L Final    Comment:                                              Initial (time 0) result  If >=50 ng/L, Myocardial injury suggested ;  Type of myocardial injury and treatment strategy  to be determined.  If 5-49 ng/L, a delta result at 2 hours and or 4 hours will be needed to further evaluate.  If <4 " ng/L, and chest pain has been >3 hours since onset, patient may qualify for discharge based on the HEART score in the ED.  If <5 ng/L and <3hours since onset of chest pain, a delta result at 2 hours will be needed to further evaluate.    HS Troponin 99th Percentile URL of a Health Population=12 ng/L with a 95% Confidence Interval of 8-18 ng/L.    Second Troponin (time 2 hours)  If calculated delta >= 20 ng/L,  Myocardial injury suggested ; Type of myocardial injury and treatment strategy to be determined.  If 5-49 ng/L and the calculated delta is 5-19 ng/L, consult medical service for evaluation.  Continue evaluation for ischemia on ecg and other possible etiology and repeat hs troponin at 4 hours.  If delta                            is <5 ng/L at 2 hours, consider discharge based on risk stratification via the HEART score (if in ED), or SAMEER risk score in IP/Observation.    HS Troponin 99th Percentile URL of a Health Population=12 ng/L with a 95% Confidence Interval of 8-18 ng/L.    Ventricular Rate 01/04/2024 81  BPM Final    Atrial Rate 01/04/2024 81  BPM Final    AK Interval 01/04/2024 110  ms Final    QRSD Interval 01/04/2024 82  ms Final    QT Interval 01/04/2024 370  ms Final    QTC Interval 01/04/2024 429  ms Final    P Axis 01/04/2024 14  degrees Final    QRS [place] 01/04/2024 77  degrees Final    T Wave Axis 01/04/2024 33  degrees Final   Admission on 01/02/2024, Discharged on 01/02/2024   Component Date Value Ref Range Status    WBC 01/02/2024 8.93  4.31 - 10.16 Thousand/uL Final    RBC 01/02/2024 4.55  3.81 - 5.12 Million/uL Final    Hemoglobin 01/02/2024 13.0  11.5 - 15.4 g/dL Final    Hematocrit 01/02/2024 40.4  34.8 - 46.1 % Final    MCV 01/02/2024 89  82 - 98 fL Final    MCH 01/02/2024 28.6  26.8 - 34.3 pg Final    MCHC 01/02/2024 32.2  31.4 - 37.4 g/dL Final    RDW 01/02/2024 12.6  11.6 - 15.1 % Final    MPV 01/02/2024 9.5  8.9 - 12.7 fL Final    Platelets 01/02/2024 257  149 - 390 Thousands/uL Final     nRBC 01/02/2024 0  /100 WBCs Final    Neutrophils Relative 01/02/2024 59  43 - 75 % Final    Immat GRANS % 01/02/2024 0  0 - 2 % Final    Lymphocytes Relative 01/02/2024 35  14 - 44 % Final    Monocytes Relative 01/02/2024 6  4 - 12 % Final    Eosinophils Relative 01/02/2024 0  0 - 6 % Final    Basophils Relative 01/02/2024 0  0 - 1 % Final    Neutrophils Absolute 01/02/2024 5.14  1.85 - 7.62 Thousands/µL Final    Immature Grans Absolute 01/02/2024 0.02  0.00 - 0.20 Thousand/uL Final    Lymphocytes Absolute 01/02/2024 3.12  0.60 - 4.47 Thousands/µL Final    Monocytes Absolute 01/02/2024 0.57  0.17 - 1.22 Thousand/µL Final    Eosinophils Absolute 01/02/2024 0.04  0.00 - 0.61 Thousand/µL Final    Basophils Absolute 01/02/2024 0.04  0.00 - 0.10 Thousands/µL Final    Sodium 01/02/2024 136  135 - 147 mmol/L Final    Potassium 01/02/2024 3.3 (L)  3.5 - 5.3 mmol/L Final    Chloride 01/02/2024 103  96 - 108 mmol/L Final    CO2 01/02/2024 25  21 - 32 mmol/L Final    ANION GAP 01/02/2024 8  mmol/L Final    BUN 01/02/2024 8  5 - 25 mg/dL Final    Creatinine 01/02/2024 0.75  0.60 - 1.30 mg/dL Final    Standardized to IDMS reference method    Glucose 01/02/2024 80  65 - 140 mg/dL Final    If the patient is fasting, the ADA then defines impaired fasting glucose as > 100 mg/dL and diabetes as > or equal to 123 mg/dL.    Calcium 01/02/2024 9.5  8.4 - 10.2 mg/dL Final    AST 01/02/2024 16  13 - 39 U/L Final    ALT 01/02/2024 10  7 - 52 U/L Final    Specimen collection should occur prior to Sulfasalazine administration due to the potential for falsely depressed results.     Alkaline Phosphatase 01/02/2024 73  34 - 104 U/L Final    Total Protein 01/02/2024 7.7  6.4 - 8.4 g/dL Final    Albumin 01/02/2024 4.4  3.5 - 5.0 g/dL Final    Total Bilirubin 01/02/2024 0.54  0.20 - 1.00 mg/dL Final    Use of this assay is not recommended for patients undergoing treatment with eltrombopag due to the potential for falsely elevated  "results.  N-acetyl-p-benzoquinone imine (metabolite of Acetaminophen) will generate erroneously low results in samples for patients that have taken an overdose of Acetaminophen.    eGFR 01/02/2024 111  ml/min/1.73sq m Final    hs TnI 0hr 01/02/2024 <2  \"Refer to ACS Flowchart\"- see link ng/L Final    Comment:                                              Initial (time 0) result  If >=50 ng/L, Myocardial injury suggested ;  Type of myocardial injury and treatment strategy  to be determined.  If 5-49 ng/L, a delta result at 2 hours and or 4 hours will be needed to further evaluate.  If <4 ng/L, and chest pain has been >3 hours since onset, patient may qualify for discharge based on the HEART score in the ED.  If <5 ng/L and <3hours since onset of chest pain, a delta result at 2 hours will be needed to further evaluate.    HS Troponin 99th Percentile URL of a Health Population=12 ng/L with a 95% Confidence Interval of 8-18 ng/L.    Second Troponin (time 2 hours)  If calculated delta >= 20 ng/L,  Myocardial injury suggested ; Type of myocardial injury and treatment strategy to be determined.  If 5-49 ng/L and the calculated delta is 5-19 ng/L, consult medical service for evaluation.  Continue evaluation for ischemia on ecg and other possible etiology and repeat hs troponin at 4 hours.  If delta                            is <5 ng/L at 2 hours, consider discharge based on risk stratification via the HEART score (if in ED), or SAMEER risk score in IP/Observation.    HS Troponin 99th Percentile URL of a Health Population=12 ng/L with a 95% Confidence Interval of 8-18 ng/L.    Ventricular Rate 01/02/2024 74  BPM Final    Atrial Rate 01/02/2024 74  BPM Final    [place] Interval 01/02/2024 138  ms Final    QRSD Interval 01/02/2024 92  ms Final    QT Interval 01/02/2024 396  ms Final    QTC Interval 01/02/2024 439  ms Final    P Axis 01/02/2024 60  degrees Final    QRS [place] 01/02/2024 62  degrees Final    T Wave Axis 01/02/2024 55  " degrees Final    D-Dimer, Quant 01/02/2024 <0.27  <0.50 ug/ml FEU Final    Reference and upper limits to exclude DVT and PE are the same.  Do not use to exclude if clinical symptoms are present.  Pregnant women:  1st trimester:  <0.22 - 1.06 ug/ml FEU  2nd trimester:  <0.22 - 1.88 ug/ml FEU  3rd trimester:   0.24 - 3.28 ug/ml FEU    Note: Normal ranges may not apply to patients who are transgender, non-binary, or whose legal sex, sex at birth, and gender identity differ.     Office Visit on 01/02/2024   Component Date Value Ref Range Status    Ventricular Rate 01/02/2024 84  BPM Final    Atrial Rate 01/02/2024 84  BPM Final    CT Interval 01/02/2024 128  ms Final    QRSD Interval 01/02/2024 90  ms Final    QT Interval 01/02/2024 352  ms Final    QTC Interval 01/02/2024 415  ms Final    P Axis 01/02/2024 48  degrees Final    QRS Axis 01/02/2024 39  degrees Final    T Wave Axis 01/02/2024 34  degrees Final     -------------------------------------------------------    Medical Decision Making / Counseling / Coordination of Care:  The following interventions are recommended: return in 3 months for follow up or sooner if needed. Individual supportive psychotherapy was provided.    Although patient's acute lethality risk is LOW, long-term/chronic lethality risk is mildly elevated given the risk factors listed above. However, at the current moment, Grace is future-oriented, forward-thinking, and demonstrates ability to act in a self-preserving manner as evidenced by volitionally seeking psychiatric evaluation and treatment today. To mitigate future risk, patient should adhere to treatment recommendations, avoid alcohol/illicit substance use, utilize community-based resources and familiar support, and prioritize mental health treatment. The diagnosis and treatment plan were reviewed with the patient. Risks, benefits, and alternatives to treatment were discussed. The importance of medication and treatment compliance was  reviewed with the patient.   PARQ for neurontin including depression/suicidality, allergic reactions (SJS, angioedema, rhabdomyolysis, eosinophilia, anaphylaxis), dizziness and somnolence, diarrhea, xerostomia, headaches, drug interactions and others.   PARQ completed including serotonin syndrome, SIADH, worsened depression/suicidality, induction of fco, blood pressure changes and GI distress, weight gain, sexual side effects, insomnia, sedation, potential for drug interactions, and others.   PARQ for neurontin including depression/suicidality, allergic reactions (SJS, angioedema, rhabdomyolysis, eosinophilia, anaphylaxis), dizziness and somnolence, diarrhea, xerostomia, headaches, drug interactions and others.   Discussed with patient the risks of sedation, respiratory depression, impairment in judgment and motor function. Depression, mood changes, GI changes, and others discussed. Patient was also informed of risks of being on or starting opioid medications due to drug interactions and potential for serious respiratory depression and death.      Controlled Medication Discussion:   Grace has been filling controlled prescriptions on time as prescribed   Discussed with Grace the risks of sedation, respiratory depression, impairment of ability to drive and potential for abuse and addiction related to treatment with benzodiazepine medications. She understands risk of treatment with benzodiazepine medications, agrees to not drive if feels impaired and agrees to take medications as prescribed  Discussed with Grace Black Box warning on concurrent use of benzodiazepines and opioid medications including sedation, respiratory depression, coma and death. She understands the risk of treatment with benzodiazepines in addition to opioids and wants to continue taking those medications  Discussed with Grace increased risk of impairment related to concurrent use of benzodiazepines and hypnotic medications including excessive  sedation, psychomotor impairment and respiratory depression. She understands the risk of treatment with benzodiazepines in addition to hypnotic medications and wants to continue taking those medications  Discussed with Grace need to slowly taper off benzodiazepines as recommended by [place] Prescription Drug Monitoring Program, due to concurrent use of opioid medications and increased risk of sedation, respiratory depression, coma and death with that combination     -------------------------------------------------------     Historical Information:  Information is copied from the previous visit and updated today as appropriate.     Past Psychiatric History:    No significant PPH, no past psychiatric hospitalizations, no past suicide attempts, no h/o self-injurious behaviors, teen superficial cutting 2 months in setting of anxiety, jan 2022 ago cut again due to dropping out of masters program because disliked it, no h/o physical altercations. Currently in outpatient therapy through [facility] on weekly basis.    Past Med Trials: Fluoxetine up to 40 mg daily (ineffective after some time), Seroquel XR up to 150 mg qhs (drowsiness), gabapentin (drowsy, hard to remember doses), zoloft (ineffective after some time)        Family Psychiatric History:    Father- Bipolar I d/o (on Prozac, Seroquel)  Pat. Great Grandmother- Schizophrenia  Mother- Panic Attacks (Xanax, previously on Zoloft)  Mat. Grandmother- Anxiety  Maternal Side- Chronic abdominal pain     Denies substance abuse or suicidality in immediate relations.         Social History:  Domiciled with parents, brother (12 y/o) in [place]. Mother employed in [employer], father employed as pharmaceutical consultant. Reports infrequent caffeine use. No active substance use. Reports that she broke up with boyfriend in 2/2021.  No current relationships.        Substance Abuse History:  Alcohol- drinking occasionally on weekends, a couple of drinks, about  once per week, limited alcohol use  Cannabis- occasional use, about once per month, currently using medical marijuana on daily basis, has medical MJ card and smokes daily (says its for anxiety and appetite stimulant)        Traumatic History:  Denies any h/o physical or sexual abuse. The following portions of the patient's history were reviewed and updated as appropriate: allergies, current medications, past family history, past medical history, past social history, past surgical history and problem list.     Denies access to lethal means / firearms.        This note was not shared with the patient due to reasonable likelihood of causing patient harm       This note was not shared with the patient due to reasonable likelihood of causing patient harm     Note: This chart was completed utilizing speech software.  Grammatical errors, random word insertions, pronoun errors, and incomplete sentences are an occasional consequence of the system due to software limitation, ambient noise, and hardware issues.  Any formal questions or concerns about the context, text, or information contained within the body of this dictation should be directly addressed to the physician for clarification.    Shad Agrawal MD

## 2024-02-16 ENCOUNTER — TELEMEDICINE (OUTPATIENT)
Dept: PSYCHIATRY | Facility: CLINIC | Age: 24
End: 2024-02-16

## 2024-02-16 ENCOUNTER — TELEPHONE (OUTPATIENT)
Dept: PSYCHIATRY | Facility: CLINIC | Age: 24
End: 2024-02-16

## 2024-02-16 DIAGNOSIS — R79.89 LOW VITAMIN D LEVEL: ICD-10-CM

## 2024-02-16 DIAGNOSIS — F12.10 CANNABIS ABUSE, DAILY USE: ICD-10-CM

## 2024-02-16 DIAGNOSIS — F33.1 MDD (MAJOR DEPRESSIVE DISORDER), RECURRENT EPISODE, MODERATE (HCC): Primary | ICD-10-CM

## 2024-02-16 DIAGNOSIS — G47.00 INSOMNIA, UNSPECIFIED TYPE: ICD-10-CM

## 2024-02-16 DIAGNOSIS — F41.1 GAD (GENERALIZED ANXIETY DISORDER): ICD-10-CM

## 2024-02-16 PROCEDURE — NC001 PR NO CHARGE: Performed by: PSYCHIATRY & NEUROLOGY

## 2024-02-16 RX ORDER — MELATONIN
1000 DAILY
Qty: 60 TABLET | Refills: 0 | Status: SHIPPED | OUTPATIENT
Start: 2024-02-16 | End: 2024-04-16

## 2024-02-16 NOTE — TELEPHONE ENCOUNTER
Patient called needing to schedule upcoming appointment. Writer transferred call to residence line for further assistance

## 2024-02-16 NOTE — TELEPHONE ENCOUNTER
Pt called to get her appt for today, 2/16  at 8:30 am switched to a virtual visit due to patient not feeling well.  Pt has [facility] and will connect with provider through [facility]. Writer switched appt and checked it in.

## 2024-02-16 NOTE — BH CRISIS PLAN
Client Name: Grace Doherty       Client YOB: 2000    [facility] Safety Plan      Creation Date: 2/16/24 Update Date: 2/16/24   Created By: Shad Agrawal MD Last Updated By: Shad Agrawal MD      Step 1: Warning Signs:   Warning Signs   eating too much   sleeping too much            Step 2: Internal Coping Strategies:   Internal Coping Strategies   getting movement, yoga   light mediation with music            Step 3: People and social settings that provide distraction:   Name Contact Information   Bernardino friend call him   Grandmother lola to her    Places   Bedroom   downtown [place]           Step 4: People whom I can ask for help during a crisis:      Name Contact Information    Kellie Doherty (Mother) [phone] (Mobile)     Bernardino     Grandmother       Step 5: Professionals or agencies I can contact during a crisis:      [facility]/Agency Name Phone Emergency Contact    Suicide Prevention Lifeline: Call or Text 946 494          Crisis Phone Numbers:   Suicide Prevention Lifeline: Call or Text  988 Crisis Text Line: Text HOME to 490-378   Please note: Some [facility] do not have a separate number for Child/Adolescent specific crisis. If your county is not listed under Child/Adolescent, please call the adult number for your county      Adult Crisis Numbers: Child/Adolescent Crisis Numbers   [facility]: [phone] [facility]: [phone]   [facility]: [phone] [facility]: [phone]   [facility]: [phone] [place]: [phone]   [facility]: [phone] [place]: [phone]   [place]: [phone]   [facility]: [phone]   [facility]: [phone]   [facility]: [phone] (daytime) [phone] (after hours, weekends, holidays)      Step 6: Making the environment safer (plan for lethal means safety):   Plan: Sharps things are put away     Optional: What is most important to me  and worth living for?   Baby cat, family     [facility] Safety Plan. Tessy Marcelino and John Adler. Used with permission of the authors.

## 2024-03-25 DIAGNOSIS — F41.9 ANXIETY DISORDER, UNSPECIFIED TYPE: ICD-10-CM

## 2024-03-25 DIAGNOSIS — G47.00 INSOMNIA, UNSPECIFIED TYPE: ICD-10-CM

## 2024-03-25 DIAGNOSIS — F33.1 MDD (MAJOR DEPRESSIVE DISORDER), RECURRENT EPISODE, MODERATE (HCC): ICD-10-CM

## 2024-03-25 RX ORDER — ARIPIPRAZOLE 5 MG/1
5 TABLET ORAL DAILY
Qty: 90 TABLET | Refills: 0 | Status: SHIPPED | OUTPATIENT
Start: 2024-03-25 | End: 2024-06-23

## 2024-03-25 NOTE — TELEPHONE ENCOUNTER
Patient requested refill of :  Requested Prescriptions     Pending Prescriptions Disp Refills    ARIPiprazole (ABILIFY) 5 mg tablet [Pharmacy Med Name: ARIPIPRAZOLE 5 MG TABLET] 90 tablet 0     Sig: TAKE 1 TABLET (5 MG TOTAL) BY MOUTH DAILY.         Refill request approved and sent to pharmacy on file per patient request.     Shad Agrawal MD

## 2024-04-16 DIAGNOSIS — F41.9 ANXIETY DISORDER, UNSPECIFIED TYPE: ICD-10-CM

## 2024-04-16 DIAGNOSIS — G47.00 INSOMNIA, UNSPECIFIED TYPE: ICD-10-CM

## 2024-04-16 RX ORDER — TRAZODONE HYDROCHLORIDE 100 MG/1
100 TABLET ORAL
Qty: 90 TABLET | Refills: 1 | Status: SHIPPED | OUTPATIENT
Start: 2024-04-16

## 2024-04-26 DIAGNOSIS — R79.89 LOW VITAMIN D LEVEL: ICD-10-CM

## 2024-04-26 RX ORDER — CHOLECALCIFEROL (VITAMIN D3) 25 MCG
1000 TABLET ORAL DAILY
Qty: 60 TABLET | Refills: 0 | Status: SHIPPED | OUTPATIENT
Start: 2024-04-26

## 2024-05-17 ENCOUNTER — TELEMEDICINE (OUTPATIENT)
Dept: PSYCHIATRY | Facility: CLINIC | Age: 24
End: 2024-05-17

## 2024-05-17 DIAGNOSIS — F12.10 CANNABIS ABUSE, DAILY USE: ICD-10-CM

## 2024-05-17 DIAGNOSIS — F41.1 GAD (GENERALIZED ANXIETY DISORDER): ICD-10-CM

## 2024-05-17 DIAGNOSIS — Z13.29 SCREENING FOR ENDOCRINE, NUTRITIONAL, METABOLIC AND IMMUNITY DISORDER: ICD-10-CM

## 2024-05-17 DIAGNOSIS — G47.00 INSOMNIA, UNSPECIFIED TYPE: ICD-10-CM

## 2024-05-17 DIAGNOSIS — Z13.228 SCREENING FOR ENDOCRINE, NUTRITIONAL, METABOLIC AND IMMUNITY DISORDER: ICD-10-CM

## 2024-05-17 DIAGNOSIS — Z13.21 SCREENING FOR ENDOCRINE, NUTRITIONAL, METABOLIC AND IMMUNITY DISORDER: ICD-10-CM

## 2024-05-17 DIAGNOSIS — F33.1 MDD (MAJOR DEPRESSIVE DISORDER), RECURRENT EPISODE, MODERATE (HCC): Primary | ICD-10-CM

## 2024-05-17 DIAGNOSIS — Z13.0 SCREENING FOR ENDOCRINE, NUTRITIONAL, METABOLIC AND IMMUNITY DISORDER: ICD-10-CM

## 2024-05-17 PROCEDURE — NC001 PR NO CHARGE: Performed by: STUDENT IN AN ORGANIZED HEALTH CARE EDUCATION/TRAINING PROGRAM

## 2024-05-17 NOTE — PATIENT INSTRUCTIONS
"Please call the office nursing staff for medication issues including refills, problems getting medications, bothersome side effects, etc., at [phone].     Please return for a follow up appointment as discussed and arrive approximately 15 minutes prior to your appointment time. If you are running late or are unable to attend your appointment, please call our [place] office at [phone] (fax: [phone]), or if you were seen in the [facility] office, please call [phone] (fax [phone]).      Recommendations regarding insomnia:  Wake-up at the same time every day  Refrain from \"napping\".  Refrain from going to bed unless you're tired  Utilize your bedroom for sleep only.  Avoid use of electronics including television and/or cellphone/computers.  Refrain from use of electronics including television and/or cellphones/computers prior to bed  Turn your alarm clock away so the light is not visible.  Attempt relaxation using various means like reading if you're restless in bed for approximately 15-20 minutes.  Participate in regular physical activities like exercise, although avoid approximately 3-4 hours prior to bed.  Morning exercise is ideal.  Avoid caffeine use prior to bedtime.  Consider tapering down excessive use of caffeine.  Avoid tobacco use prior to bedtime.  Avoid alcohol use prior to bedtime.  Consider reading \"No More Sleepless nights\" by John Cook, Ph.D.  Consider use of online resources including:  http://G.I. Java/cbt-online-insomnia-treatment.html  http://www.COM DEV  CBT-I  Magaly on your Smart Phone.  Go! To Sleep by the [facility].    Look up \"grounding techniques\" and/or \"anchoring demonstration\" online and try a few to see what may work for you. Practice these skills before you need them, when you are not feeling too anxious or triggered. You can also search for free guided meditation videos online to help improve your head space when you are feeling " "very anxious or triggered.        Healthy Diet   The American Heart Association and the American College of Cardiology have long recommended a healthy diet for not only patients who are at risk for atherosclerotic cardiovascular disease (ASCVD) but also the general public. In keeping with this evidence-based recommendation, the \"2018 Guideline on the Management of Blood Cholesterol\" stresses that a healthy diet should include adequate intake of these essentials:   Vegetables, fruits, and whole grains   Legumes and nuts   Low-fat dairy products   Low-fat poultry (without the skin)   Fish and seafood   Nontropical vegetable oils     The recent guidelines do provide room for cultural food preferences in a healthy diet, but in general, all patients should limit their intake of saturated and avoid all trans fats, sweets, sugar-sweetened beverages, and red meats.  Please maintain adequate hydration of at least 2 Liters of water per day, and improve nutrition by decreasing portion sizes, avoiding fried foods, fast foods, inappropriate snacking and overly processed and packaged items with 'added sugars' (whether in drinks or foods), and observing nutritional facts information on any items that are packaged to reduce intake of saturated fats and AVOID trans fats as mentioned above. Again, consume whole foods such as vegetables, higher lean protein intake, and using healthier lifestyle plans such as the Mediterranean diet with healthier fats such as those from seeds, nuts, and using organic extra virgin olive oil or avocado oil in lieu of processed vegetable oils or margarine.    Physical Activity   Recommended DAILY exercise for at least 150 minutes of moderate exercise weekly!  In addition to a healthy diet, all patients should include regular physical activity in their weekly routines, at moderate to vigorous intensity. Any activity is better than nothing, so if your patients can't meet the recommendation of vigorous " activity, moderate-intensity activity can still help them reduce their risk of ASCVD.     Below are the American Heart Association's recommendations for physical activity per week (preferably spread throughout the week):     For Overall Cardiovascular Health and Lowering Cholesterol   At least 150 minutes of moderate-intensity physical activity (for example, 30 minutes, 5 days a week), or   At least 75 minutes of vigorous-intensity physical activity (for example, 25 minutes, 3 days a week); or   A combination of moderate- and vigorous-intensity aerobic activity, and   At least 2 days of moderate- to high-intensity muscle-strengthening activities (such as resistance weight training) for additional health benefits    If you have thoughts of harming yourself or are otherwise in psychological crisis, do not hesitate to contact your [facility] Crisis hotline, or 911 or go to the nearest emergency room.  Adult Crisis Numbers  Suicide Prevention Hotline - Dial 9-8-8  [facility]: [phone] or [phone]  [facility]: [phone]  [facility]: [phone]  [facility]: [phone]  [place]: [phone] or [phone]  [facility]: [phone]  [facility]: [phone] or [facility] Crisis: [phone]  [facility] [facility]: [phone] (daytime).       [phone] (after hours, weekends, holidays)     Child/Adolescent Crisis Numbers   [facility]: [phone]   [facility]: [phone]   [place]: [phone]   [place]: [phone]  Please note: Some [facility] do not have a separate number for Child/Adolescent specific crisis. If your county is not listed under Child/Adolescent, please call the adult number for your county     National Talk to Text Line   All Ages - 908-488    National Suicide Prevention Hotline: 1-162.484.6617 or call 413.

## 2024-05-17 NOTE — PSYCH
Virtual Visit Disclaimer & Required Documentation  TeleMed provider: Shad Agrawal MD. located at [facility], [address] [place] [address] in [place], [place], [facility]. The patient is also located in [place] which is the state in which I hold an active license.    The patient was identified by name and date of birth. Grace Doherty was informed that this is a telemedicine visit and that the visit is being conducted through [employer] and patient was informed this is a secure, HIPAA-complaint platform. She agrees to proceed. My office door was closed. No one else was in the room. She acknowledged consent and understanding of privacy and security of the video platform. The patient has agreed to participate and understands they can discontinue the visit at any time.    Grace Doherty verbally agrees to participate in Virtual Care Services. Pt is aware that Virtual Care Services could be limited without vital signs or the ability to perform a full hands-on physical exam. The patient understands she or the provider may request at any time to terminate the video visit and request the patient to seek care or treatment in person. Patient is aware this is a billable service.      Psychiatric Follow Up Visit - Behavioral Health   MRN: [id number]  Visit Time  Start: 1007. Stop: 1027. Total: 20 minutes.    Assessment & Plan   Grace Doherty is a 23 y.o. female, Single with prior psychiatric diagnoses of anxiety, depression, insomnia (rule out bipolar), no past psychiatric hospitalizations, no past suicide attempts, h/o self-injurious behaviors (cutting), no h/o physical altercations, PMH significant for h/o lyme disease, gastritis, h/o esophageal candidiasis, daily medical marijuana use, and history of heavy alcohol use in college; with suicide risk factors including history of self-harm as recently as 2022 jan, chronic mental illness, medical problems, chronic pain, financial problems, anxiety, impulsivity, substance abuse  and never .       In the setting of stability of psychotropic medication regiment, patient will continue medications as prescribed and plan to follow up in 1 month for further medications management and last appointment before 4 month follow-up over the summer.  While PHQ-9 is elevated, it was filled out in a situation where she was frustrated with her friend.  On reassessment it was lowered and on subjective measurements anxiety and depression were mild, both 3/10.  She will continue to monitor symptoms and will follow up to discuss symptoms next month.      Patient was informed of the adverse effects of cannabis use/abuse on their physical health including: respiratory disease in addition to diminished pulmonary functioning, hypertension, heart disease including arrhythmias and/or myocardial infarction, stroke, weakening of the immune system, attention and concentration problems, behavioral problems like substance induced mood disorders and/or psychosis, increased instances of multiple malignancies possibly including the lungs, head/neck and testicles/ovaries including diminished sexual functioning in addition to problematic reproduction, hyperemesis syndrome and interactions with an individual's medication regimen in addition to risk of intoxication, particularly in the presence of sedative agents like alcohol and/or benzodiazepines. Patient was informed of the possible withdrawal syndrome associated to prevalent and persistent use of cannabis including: headache, rigors, tremulousness, anxiety, anger, irritability, insomnia, fatigue, diminished appetite including possible unintentional decrease in weight and gastrointestinal upset including nausea, vomiting and diarrhea. This writer recommended abstinence from cannabis use/abuse in the presence of the aforementioned effects. Patient was receptive to the recommendations of this writer.        Diagnosis:   Major depressive disorder, recurrent episode, mild  - not at goal, improved  Generalized anxiety disorder - not at goal, improved  Insomnia, unspecified - at goal  Cannabis abuse, daily use     Treatment Recommendations/Precautions:  At follow-up visit consider initiation of gabapentin to aid in patient's attempts to cut back on marijuana usage as she is not able to cold turkey in the next 3 months.    Not ready to quit today  In past caused drowsiness and she found it hard to remember doses, but she is agreeable with consideration of reinitiating a low-dose.  Continue Abilify 5 mg daily for mood stability, as augmentation for depression  Abilify was increased to 7.5 mg while at PHP resulted in restlessness, possible akathisia  Continue Cymbalta 90 mg qAM for anxiety, depression, mood stability  Trial of lower dose of 60 mg resulted in worsening of anxiety and depression  Continue trazodone 100 mg q.h.s. for insomnia, depression, generalized anxiety  Continue melatonin to 5mg qHS for insomnia  Continue Ativan 1 mg daily for JADEN and panic attack prevention  Has been using 1 pill every month for panic attack prevention        Therapy:  Mervin got a new therapist, [facility]. Weekly.  Medical:   Follow up with primary care physician for ongoing medical care   Labs:   Vitamin D level low at 27.4; repleted  Most recently obtained 1/2/2024, reviewed              Treatment Plan:  The next plan will be due 6/28/2024.     -------------------------------------------------------     History of Present Illness         Grace Doherty is a 23 y.o. female, Single; with prior psychiatric diagnoses of anxiety, depression, insomnia (rule out bipolar), no past psychiatric hospitalizations, no past suicide attempts, no  h/o self-injurious behaviors (cutting), no h/o physical altercations, PMH significant for h/o lyme disease, gastritis, h/o esophageal candidiasis, daily medical marijuana use, and history of heavy alcohol use in college; with suicide risk factors including history of  "self-harm as recently as 2022 jan, chronic mental illness, medical problems, chronic pain, financial problems, anxiety, impulsivity, substance abuse and never ; now presenting to follow up for anxiety, depression and insomnia.       Today, Grace reports she is overall doing well, denies medication side effects, and is satisfied with medications, denying stressors, and stating on average all psychiatric symptoms are under control.  She denies a desire to stop medical cannabis at this point in time.     Anxiety: average 3/10  Depression: 3/10, improving. Grandmother \"aorta tear\" and less falls, less stressors, family frustrated.  PHQ-9 was scored at 21 for severe depression but she notes that this is all in the setting of a difficult situation where she was dealing with her friend who she states is in an abusive relationship.  At baseline, her depression and anxiety are low on subjective measurements.  She does not feel the need for any changes in medications because she is doing well.  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    Ativan freq: 1/ month  Panic attacks: improving, once every 2 months    Psychiatric Review Of Systems:  Grace reports Symptoms as described in HPI.  Grace denies Current suicidal thoughts, plan, or intent, Current thoughts of self-harm, or Current homicidal thoughts, plan, or intent.    Medical Review Of Systems:  Complete review of systems is negative except as noted above.    ------------------------------------  Past Medical History:   Diagnosis Date    Anemia     Anxiety     Asthma     hx of as child - outgrew     Depression     Gastritis     Gastroparesis     GERD (gastroesophageal reflux disease)     Lyme disease     Migraine       Past Surgical History:   Procedure Laterality Date    ESOPHAGOGASTRODUODENOSCOPY   " "   EGD with bipsy    UPPER GASTROINTESTINAL ENDOSCOPY         Visit Vitals  OB Status Unknown   Smoking Status Never      Wt Readings from Last 6 Encounters:   02/05/24 70 kg (154 lb 6.4 oz)   01/10/24 66.7 kg (147 lb)   01/02/24 66.7 kg (147 lb)   12/27/23 68.9 kg (152 lb)   11/01/23 67.1 kg (148 lb)   10/27/23 67.6 kg (149 lb)        Mental Status Exam:  Appearance:  alert, good eye contact, appears stated age, casually dressed, and appropriate grooming and hygiene   Behavior:  calm and cooperative   Motor: no abnormal movements and normal gait and balance   Speech:  spontaneous and coherent   Mood:  \"good\"   Affect:  mood-congruent and constricted   Thought Process:  Organized, logical, goal-directed   Thought Content: no verbalized delusions or overt paranoia   Perceptual disturbances: no reported hallucinations and does not appear to be responding to internal stimuli at this time   Risk Potential: No active or passive suicidal or homicidal ideation was verbalized during interview   Cognition: oriented to self and situation, appears to be of average intelligence, and cognition not formally tested   Insight:  Good   Judgment: Fair       Meds/Allergies    Allergies   Allergen Reactions    Pineapple - Food Allergy Anaphylaxis    Penicillins Hives and Vomiting     Current Outpatient Medications   Medication Instructions    albuterol (ProAir HFA) 90 mcg/act inhaler 2 puffs, Inhalation, Every 6 hours PRN    ARIPiprazole (ABILIFY) 5 mg, Oral, Daily    DULoxetine (Cymbalta) 30 mg delayed release capsule Take 90 mg (one 30 mg tablet and one 60 mg tablet) every morning for a total of 90 mg daily    DULoxetine (CYMBALTA) 60 mg delayed release capsule Take 90 mg (one 30 mg tablet and one 60 mg tablet) every morning for a total of 90 mg daily    levonorgestrel-ethinyl estradiol (Marlissa) 0.15-30 MG-MCG per tablet 1 tablet, Oral, Daily    LORazepam (ATIVAN) 1 mg, Oral, Daily PRN    metoprolol succinate (TOPROL-XL) 25 mg, " Oral, Daily    traZODone (DESYREL) 100 mg, Oral, Daily at bedtime,       Vitamin D3 1,000 Units, Oral, Daily        Labs & Imaging:  I have personally reviewed all pertinent laboratory tests and imaging results.   No visits with results within 2 Month(s) from this visit.   Latest known visit with results is:   Appointment on 02/08/2024   Component Date Value Ref Range Status    Vit D, 25-Hydroxy 02/08/2024 27.4 (L)  30.0 - 100.0 ng/mL Final    Vitamin D guidelines established by Clinical Guidelines Subcommittee  of the Endocrine Society Task Force, 2011    Deficiency <20ng/ml   Insufficiency 20-30ng/ml   Sufficient  ng/ml      -------------------------------------------------------    Medical Decision Making / Counseling / Coordination of Care:  The following interventions are recommended: return in 1 month for follow up and continue psychotherapy. Individual supportive psychotherapy was provided.    Although patient's acute lethality risk is LOW, long-term/chronic lethality risk is mildly elevated given the risk factors listed above. However, at the current moment, Grace is future-oriented, forward-thinking, and demonstrates ability to act in a self-preserving manner as evidenced by volitionally seeking psychiatric evaluation and treatment today. To mitigate future risk, patient should adhere to treatment recommendations, avoid alcohol/illicit substance use, utilize community-based resources and familiar support, and prioritize mental health treatment. The diagnosis and treatment plan were reviewed with the patient. Risks, benefits, and alternatives to treatment were discussed. The importance of medication and treatment compliance was reviewed with the patient.   PARQ for neurontin including depression/suicidality, allergic reactions (SJS, angioedema, rhabdomyolysis, eosinophilia, anaphylaxis), dizziness and somnolence, diarrhea, xerostomia, headaches, drug interactions and others.   PARQ completed including  serotonin syndrome, SIADH, worsened depression/suicidality, induction of fco, blood pressure changes and GI distress, weight gain, sexual side effects, insomnia, sedation, potential for drug interactions, and others.   PARQ for neurontin including depression/suicidality, allergic reactions (SJS, angioedema, rhabdomyolysis, eosinophilia, anaphylaxis), dizziness and somnolence, diarrhea, xerostomia, headaches, drug interactions and others.   Discussed with patient the risks of sedation, respiratory depression, impairment in judgment and motor function. Depression, mood changes, GI changes, and others discussed. Patient was also informed of risks of being on or starting opioid medications due to drug interactions and potential for serious respiratory depression and death.      Controlled Medication Discussion:   Grace has been filling controlled prescriptions on time as prescribed   Discussed with Grace the risks of sedation, respiratory depression, impairment of ability to drive and potential for abuse and addiction related to treatment with benzodiazepine medications. She understands risk of treatment with benzodiazepine medications, agrees to not drive if feels impaired and agrees to take medications as prescribed  Discussed with Grace Black Box warning on concurrent use of benzodiazepines and opioid medications including sedation, respiratory depression, coma and death. She understands the risk of treatment with benzodiazepines in addition to opioids and wants to continue taking those medications  Discussed with Grace increased risk of impairment related to concurrent use of benzodiazepines and hypnotic medications including excessive sedation, psychomotor impairment and respiratory depression. She understands the risk of treatment with benzodiazepines in addition to hypnotic medications and wants to continue taking those medications  Discussed with Grace need to slowly taper off benzodiazepines as recommended by  [place] Prescription Drug Monitoring Program, due to concurrent use of opioid medications and increased risk of sedation, respiratory depression, coma and death with that combination     -------------------------------------------------------     Historical Information:  Information is copied from the previous visit and updated today as appropriate.     Past Psychiatric History:    No significant PPH, no past psychiatric hospitalizations, no past suicide attempts, no h/o self-injurious behaviors, teen superficial cutting 2 months in setting of anxiety, jan 2022 ago cut again due to dropping out of masters program because disliked it, no h/o physical altercations. Currently in outpatient therapy through [facility] on weekly basis.    Past Med Trials: Fluoxetine up to 40 mg daily (ineffective after some time), Seroquel XR up to 150 mg qhs (drowsiness), gabapentin (drowsy, hard to remember doses), zoloft (ineffective after some time)        Family Psychiatric History:    Father- Bipolar I d/o (on Prozac, Seroquel)  Pat. Great Grandmother- Schizophrenia  Mother- Panic Attacks (Xanax, previously on Zoloft)  Mat. Grandmother- Anxiety  Maternal Side- Chronic abdominal pain     Denies substance abuse or suicidality in immediate relations.         Social History:  Domiciled with parents, brother (14 y/o) in [place]. Mother employed in [employer], father employed as pharmaceutical consultant. Reports infrequent caffeine use. No active substance use. Reports that she broke up with boyfriend in 2/2021.  No current relationships.        Substance Abuse History:  Alcohol- drinking occasionally on weekends, a couple of drinks, about once per week, limited alcohol use  Cannabis- Daily use morning until sleep, for anxiety.  No desire to quit, medical MJ she reports.        Traumatic History:  Denies any h/o physical or sexual abuse. The following portions of the patient's history were reviewed and updated as  appropriate: allergies, current medications, past family history, past medical history, past social history, past surgical history and problem list.     Denies access to lethal means / firearms.    This note was not shared with the patient due to reasonable likelihood of causing patient harm     Note: This chart was completed utilizing speech software.  Grammatical errors, random word insertions, pronoun errors, and incomplete sentences are an occasional consequence of the system due to software limitation, ambient noise, and hardware issues.  Any formal questions or concerns about the context, text, or information contained within the body of this dictation should be directly addressed to the physician for clarification.    Shad Agrawal MD

## 2024-05-28 DIAGNOSIS — F41.9 ANXIETY DISORDER, UNSPECIFIED TYPE: ICD-10-CM

## 2024-05-28 DIAGNOSIS — F41.1 GAD (GENERALIZED ANXIETY DISORDER): ICD-10-CM

## 2024-05-28 DIAGNOSIS — G47.00 INSOMNIA, UNSPECIFIED TYPE: ICD-10-CM

## 2024-05-28 DIAGNOSIS — F33.1 MDD (MAJOR DEPRESSIVE DISORDER), RECURRENT EPISODE, MODERATE (HCC): ICD-10-CM

## 2024-05-29 RX ORDER — DULOXETIN HYDROCHLORIDE 30 MG/1
CAPSULE, DELAYED RELEASE ORAL
Qty: 30 CAPSULE | Refills: 2 | Status: SHIPPED | OUTPATIENT
Start: 2024-05-29

## 2024-07-10 DIAGNOSIS — F41.9 ANXIETY DISORDER, UNSPECIFIED TYPE: ICD-10-CM

## 2024-07-10 DIAGNOSIS — G47.00 INSOMNIA, UNSPECIFIED TYPE: ICD-10-CM

## 2024-07-10 DIAGNOSIS — F33.1 MDD (MAJOR DEPRESSIVE DISORDER), RECURRENT EPISODE, MODERATE (HCC): ICD-10-CM

## 2024-07-10 RX ORDER — ARIPIPRAZOLE 5 MG/1
5 TABLET ORAL DAILY
Qty: 90 TABLET | Refills: 0 | Status: CANCELLED | OUTPATIENT
Start: 2024-07-10 | End: 2024-10-08

## 2024-07-13 ENCOUNTER — TELEPHONE (OUTPATIENT)
Dept: OTHER | Facility: OTHER | Age: 24
End: 2024-07-13

## 2024-07-13 DIAGNOSIS — F41.9 ANXIETY DISORDER, UNSPECIFIED TYPE: ICD-10-CM

## 2024-07-13 DIAGNOSIS — G47.00 INSOMNIA, UNSPECIFIED TYPE: ICD-10-CM

## 2024-07-13 DIAGNOSIS — F33.1 MDD (MAJOR DEPRESSIVE DISORDER), RECURRENT EPISODE, MODERATE (HCC): ICD-10-CM

## 2024-07-13 RX ORDER — ARIPIPRAZOLE 5 MG/1
5 TABLET ORAL DAILY
Qty: 90 TABLET | Refills: 0 | Status: SHIPPED | OUTPATIENT
Start: 2024-07-13 | End: 2024-10-11

## 2024-07-13 NOTE — TELEPHONE ENCOUNTER
"Spoke to patient nurse that she ran out of her Abilify 5 mg and is requesting refill.  Reports that she is on the verge of having \"a mental breakdown\" without it.  It appears that there is a pending order for Abilify in her chart.  Will cancel that and send new prescription.  Confirmed pharmacy and sent a refill of Abilify 5 mg.  "

## 2024-07-13 NOTE — TELEPHONE ENCOUNTER
Pt called reporting she is out of her Aripiprazole 5 mg script and has been trying to get that filled for the past few days. She is on the verge of a mental breakdown and would like to have that called into her pharmacy.    On call notified via epic chat

## 2024-07-25 ENCOUNTER — TELEPHONE (OUTPATIENT)
Age: 24
End: 2024-07-25

## 2024-07-25 NOTE — TELEPHONE ENCOUNTER
Called patient back and explained Dr. Agrawal will be returning in October. Mentioned I will call her end of August-in to September. Patient acknowledged and satisfied with response.    Patient also mentioned that her school is requiring a school note excusing her absence from a mental health crisis she experienced on 7/13/2024. Please advise.    From: Nevin Sal  To: Kasandra Wang NP  Sent: 12/31/2019 10:29 AM CST  Subject: Medication Question    Hello,  Boston Dispensary’s address is [address] [place] [address]. thank you!!

## 2024-07-25 NOTE — LETTER
[facility]  [address]  [place] [address]  TELEPHONE: [phone]  FAX: [phone]    07/26/24       Patient: Grace Doherty   YOB: 2000        To Whom It May Concern:    This letter is being written in regards to Grace Doherty (2000), currently under my care at [facility]. After a complete psychiatric evaluation on 8/24/2022 and most recent evaluation on 5/17/2024, Grace has been diagnosed with major depressive disorder, recurrent episode, mild, generalized anxiety disorder, and insomnia, unspecified.    Given their difficulties with transitions and anxiety symptoms, it may be difficult to make it to school on time in the mornings or stay in school until the afternoon. Please medically excused Grace from being tardy, absent, or leaving school due to an exacerbation of mood or anxiety symptoms. Grace was recently absent on 7/13/2024.     Please contact our office if any additional information is required. Thank you for your understanding.         Sincerely,         Pedro Franco MD  Child & Adolescent Psychiatrist  [facility]

## 2024-07-25 NOTE — TELEPHONE ENCOUNTER
Patient called in looking to schedule a follow up with their medication management provider.    Writer made resident  aware and stated they would reach out at their earliest convenience.    Best call back number for the patient is [phone].

## 2024-07-26 NOTE — TELEPHONE ENCOUNTER
Letter generated and routed to referring provider.    Outreached patient via phone at [phone]. The patient was available and verified name and date of birth. Was made aware that we would require a Medical Release form to be signed for their school. Patient stated they attend online classes through [employer].    Patient agreeable to signing same. Patient requested to sign the NOREEN form and  the letter on the same trip to [facility]. This writer offered to callback when letter is ready.    NOREEN was created in placed in white binder for  colleagues.

## 2024-08-20 DIAGNOSIS — F33.1 MDD (MAJOR DEPRESSIVE DISORDER), RECURRENT EPISODE, MODERATE (HCC): ICD-10-CM

## 2024-08-20 DIAGNOSIS — G47.00 INSOMNIA, UNSPECIFIED TYPE: ICD-10-CM

## 2024-08-20 DIAGNOSIS — F41.9 ANXIETY DISORDER, UNSPECIFIED TYPE: ICD-10-CM

## 2024-08-20 RX ORDER — DULOXETIN HYDROCHLORIDE 60 MG/1
CAPSULE, DELAYED RELEASE ORAL
Qty: 30 CAPSULE | Refills: 2 | Status: SHIPPED | OUTPATIENT
Start: 2024-08-20

## 2024-08-23 ENCOUNTER — TELEPHONE (OUTPATIENT)
Dept: PSYCHIATRY | Facility: CLINIC | Age: 24
End: 2024-08-23

## 2024-08-23 NOTE — TELEPHONE ENCOUNTER
Called and left message for patient to return a call to [phone] and schedule follow up in October with provider (Dr. Shad Agrawal). Please schedule upon return call. Thank you.

## 2024-09-23 DIAGNOSIS — F41.9 ANXIETY DISORDER, UNSPECIFIED TYPE: ICD-10-CM

## 2024-09-23 DIAGNOSIS — F41.1 GAD (GENERALIZED ANXIETY DISORDER): ICD-10-CM

## 2024-09-23 DIAGNOSIS — F33.1 MDD (MAJOR DEPRESSIVE DISORDER), RECURRENT EPISODE, MODERATE (HCC): ICD-10-CM

## 2024-09-23 DIAGNOSIS — G47.00 INSOMNIA, UNSPECIFIED TYPE: ICD-10-CM

## 2024-09-24 RX ORDER — DULOXETIN HYDROCHLORIDE 30 MG/1
CAPSULE, DELAYED RELEASE ORAL
Qty: 30 CAPSULE | Refills: 2 | Status: SHIPPED | OUTPATIENT
Start: 2024-09-24

## 2024-09-25 ENCOUNTER — NURSE TRIAGE (OUTPATIENT)
Age: 24
End: 2024-09-25

## 2024-09-25 DIAGNOSIS — Z00.6 ENCOUNTER FOR EXAMINATION FOR NORMAL COMPARISON OR CONTROL IN CLINICAL RESEARCH PROGRAM: ICD-10-CM

## 2024-09-25 NOTE — TELEPHONE ENCOUNTER
"Pt called in reporting vaginal bleeding that started after intercourse a few days ago and is continuing. She is wearing pads, changing them every 4-5 hours. Denies passing any clots or having any pain. States she does feel more fatigued than usual, unsure if from this or the weather. LMP about 2.5 weeks ago, states periods are regular. Denies chance of pregnancy. Pt given appointment for 9/27/24 and is thankful. Aware to call back with any worsening symptoms in the meantime.     Reason for Disposition   Bleeding or spotting occurs after sex (Exception: first intercourse)    Answer Assessment - Initial Assessment Questions  1. AMOUNT: \"Describe the bleeding that you are having.\"     - SPOTTING: spotting, or pinkish / brownish mucous discharge; does not fill panti-liner or pad     - MILD:  less than 1 pad / hour; less than patient's usual menstrual bleeding    - MODERATE: 1-2 pads / hour; 1 menstrual cup every 6 hours; small-medium blood clots (e.g., pea, grape, small coin)    - SEVERE: soaking 2 or more pads/hour for 2 or more hours; 1 menstrual cup every 2 hours; bleeding not contained by pads or continuous red blood from vagina; large blood clots (e.g., golf ball, large coin)       mild  2. ONSET: \"When did the bleeding begin?\" \"Is it continuing now?\"      A few days ago  3. MENSTRUAL PERIOD: \"When was the last normal menstrual period?\" \"How is this different than your period?\"      LMP about 2.5 weeks ago  4. REGULARITY: \"How regular are your periods?\"      regular  5. ABDOMINAL PAIN: \"Do you have any pain?\" \"How bad is the pain?\"  (e.g., Scale 1-10; mild, moderate, or severe)    - MILD (1-3): doesn't interfere with normal activities, abdomen soft and not tender to touch     - MODERATE (4-7): interferes with normal activities or awakens from sleep, tender to touch     - SEVERE (8-10): excruciating pain, doubled over, unable to do any normal activities       denies  6. PREGNANCY: \"Could you be pregnant?\" \"Are you " "sexually active?\" \"Did you recently give birth?\"      denies  7. BREASTFEEDING: \"Are you breastfeeding?\"      denies  8. HORMONES: \"Are you taking any hormone medications, prescription or OTC?\" (e.g., birth control pills, estrogen)      OCP  9. BLOOD THINNERS: \"Do you take any blood thinners?\" (e.g., Coumadin/warfarin, Pradaxa/dabigatran, aspirin)      denies  10. CAUSE: \"What do you think is causing the bleeding?\" (e.g., recent gyn surgery, recent gyn procedure; known bleeding disorder, cervical cancer, polycystic ovarian disease, fibroids)          unsure  11. HEMODYNAMIC STATUS: \"Are you weak or feeling lightheaded?\" If Yes, ask: \"Can you stand and walk normally?\"         denies  12. OTHER SYMPTOMS: \"What other symptoms are you having with the bleeding?\" (e.g., passed tissue, vaginal discharge, fever, menstrual-type cramps)        denies    Protocols used: Vaginal Bleeding - Abnormal-ADULT-[place]    "

## 2024-09-27 ENCOUNTER — OFFICE VISIT (OUTPATIENT)
Dept: OBGYN CLINIC | Facility: CLINIC | Age: 24
End: 2024-09-27
Payer: COMMERCIAL

## 2024-09-27 VITALS
DIASTOLIC BLOOD PRESSURE: 72 MMHG | WEIGHT: 157.2 LBS | SYSTOLIC BLOOD PRESSURE: 110 MMHG | BODY MASS INDEX: 26.19 KG/M2 | HEIGHT: 65 IN

## 2024-09-27 DIAGNOSIS — N93.0 POSTCOITAL BLEEDING: ICD-10-CM

## 2024-09-27 DIAGNOSIS — R87.611 PAP SMEAR OF CERVIX WITH ASCUS, CANNOT EXCLUDE HGSIL: ICD-10-CM

## 2024-09-27 DIAGNOSIS — Z11.3 SCREENING FOR STD (SEXUALLY TRANSMITTED DISEASE): Primary | ICD-10-CM

## 2024-09-27 LAB — SL AMB POCT URINE HCG: NEGATIVE

## 2024-09-27 PROCEDURE — 57454 BX/CURETT OF CERVIX W/SCOPE: CPT | Performed by: PHYSICIAN ASSISTANT

## 2024-09-27 PROCEDURE — 87491 CHLMYD TRACH DNA AMP PROBE: CPT | Performed by: PHYSICIAN ASSISTANT

## 2024-09-27 PROCEDURE — 87591 N.GONORRHOEAE DNA AMP PROB: CPT | Performed by: PHYSICIAN ASSISTANT

## 2024-09-27 PROCEDURE — 81025 URINE PREGNANCY TEST: CPT | Performed by: PHYSICIAN ASSISTANT

## 2024-09-27 PROCEDURE — 88305 TISSUE EXAM BY PATHOLOGIST: CPT | Performed by: PATHOLOGY

## 2024-09-27 PROCEDURE — 88344 IMHCHEM/IMCYTCHM EA MLT ANTB: CPT | Performed by: PATHOLOGY

## 2024-09-27 NOTE — PROGRESS NOTES
Colposcopy    Date/Time: 9/27/2024 9:00 AM    Performed by: Ban Arzola PA-C  Authorized by: Ban Arzola PA-C    Other Assisting Provider: No    Verbal consent obtained?: Yes    Written consent obtained?: Yes    Consent given by:  Patient  Patient states understanding of procedure being performed: Yes    Patient's understanding of procedure matches consent: Yes    Procedure consent matches procedure scheduled: Yes    Required items: Required blood products, implants, devices and special equipment available (Negative UPT at time of office visit)    Patient identity confirmed:  Verbally with patient  Pre-procedure:     Premeds:  Acetaminophen (1,000mg)    Prepped with: acetic acid    Indication:     Indication:  ASC-H (postcoital bleeding)  Procedure:     Procedure: Colposcopy w/ cervical biopsy and ECC      Under satisfactory analgesia the patient was prepped and draped in the dorsal lithotomy position: yes      Lenox speculum was placed in the vagina: yes      Under colposcopic examination the transition zone was seen in entirety: yes      Endocervix was curetted using a Kevorkian curette: yes      Cervical biopsy performed with a cervical biopsy punch: yes (12,3,6)      Monsel's solution was applied: yes      Allis/Tenaculum removed and cervix homostatic: yes      Specimen(s) to pathology: yes    Post-procedure:     Findings: Friable cervix, Punctation and White epithelium      Impression: High grade cervical dysplasia      Patient tolerance of procedure:  Tolerated well, no immediate complications  Comments:      PAP 2/2024, ASC-H, did not have colpo  Negative UPT in office today  Urine sent for GC

## 2024-09-28 LAB
C TRACH DNA SPEC QL NAA+PROBE: NEGATIVE
N GONORRHOEA DNA SPEC QL NAA+PROBE: NEGATIVE

## 2024-10-01 PROBLEM — F33.1 MDD (MAJOR DEPRESSIVE DISORDER), RECURRENT EPISODE, MODERATE (HCC): Status: ACTIVE | Noted: 2024-10-01

## 2024-10-01 PROBLEM — F12.10 CANNABIS ABUSE, DAILY USE: Status: ACTIVE | Noted: 2024-10-01

## 2024-10-02 ENCOUNTER — TELEPHONE (OUTPATIENT)
Age: 24
End: 2024-10-02

## 2024-10-02 NOTE — TELEPHONE ENCOUNTER
Patient is calling regarding cancelling an appointment.    Date/Time: 10/2/2024 at 10:30 am    Reason: no transportation    Patient was rescheduled: YES [x] NO []  If yes, when was Patient reschedule for: 10/9/2024  at 11:30 am    Patient requesting call back to reschedule: YES [] NO [x]

## 2024-10-04 PROCEDURE — 88344 IMHCHEM/IMCYTCHM EA MLT ANTB: CPT | Performed by: PATHOLOGY

## 2024-10-04 PROCEDURE — 88305 TISSUE EXAM BY PATHOLOGIST: CPT | Performed by: PATHOLOGY

## 2024-10-08 NOTE — PSYCH
Psychiatric Follow Up Visit - Behavioral Health   MRN: [id number]   Visit Time  Start: 1130. Stop: 1159. Total: 29 minutes.        Assessment & Plan  MDD (major depressive disorder), recurrent episode, mild (HCC)  Stable, continue medication  Orders:    ARIPiprazole (ABILIFY) 5 mg tablet; Take 1 tablet (5 mg total) by mouth daily    DULoxetine (CYMBALTA) 60 mg delayed release capsule; Take 90 mg (one 30 mg tablet and one 60 mg tablet) every morning for a total of 90 mg daily    DULoxetine (CYMBALTA) 30 mg delayed release capsule; TAKE 1 CAPSULE BY MOUTH EVERY DAY IN THE MORNING WITH 60MG CAPSULE FOR TOTAL DOSE OF 90MG    JADEN (generalized anxiety disorder)  Stable, continue medication  Orders:    ARIPiprazole (ABILIFY) 5 mg tablet; Take 1 tablet (5 mg total) by mouth daily    DULoxetine (CYMBALTA) 60 mg delayed release capsule; Take 90 mg (one 30 mg tablet and one 60 mg tablet) every morning for a total of 90 mg daily    traZODone (DESYREL) 100 mg tablet; Take 1 tablet (100 mg total) by mouth daily at bedtime    LORazepam (ATIVAN) 1 mg tablet; Take 1 tablet (1 mg total) by mouth daily as needed for anxiety    DULoxetine (CYMBALTA) 30 mg delayed release capsule; TAKE 1 CAPSULE BY MOUTH EVERY DAY IN THE MORNING WITH 60MG CAPSULE FOR TOTAL DOSE OF 90MG    Insomnia, unspecified type  Stable, continue medication  Orders:    ARIPiprazole (ABILIFY) 5 mg tablet; Take 1 tablet (5 mg total) by mouth daily    DULoxetine (CYMBALTA) 60 mg delayed release capsule; Take 90 mg (one 30 mg tablet and one 60 mg tablet) every morning for a total of 90 mg daily    traZODone (DESYREL) 100 mg tablet; Take 1 tablet (100 mg total) by mouth daily at bedtime    DULoxetine (CYMBALTA) 30 mg delayed release capsule; TAKE 1 CAPSULE BY MOUTH EVERY DAY IN THE MORNING WITH 60MG CAPSULE FOR TOTAL DOSE OF 90MG    Cannabis abuse, daily use  Decreasing amount, continue to        Screening for endocrine, nutritional, metabolic and immunity  disorder    Orders:    Lipid panel; Future    Hemoglobin A1C; Future    TSH + Free T4; Future    Vitamin D 25 hydroxy; Future    Comprehensive metabolic panel; Future    CBC and differential; Future      Grace Doherty is a 24 y.o. female, Single with prior psychiatric diagnoses of anxiety, depression, insomnia (rule out bipolar), no past psychiatric hospitalizations, no past suicide attempts, h/o self-injurious behaviors (cutting), no h/o physical altercations, PMH significant for h/o lyme disease, gastritis, h/o esophageal candidiasis, daily medical marijuana use, and history of heavy alcohol use in college; with suicide risk factors including history of self-harm as recently as 2022 jan, chronic mental illness, medical problems, chronic pain, financial problems, anxiety, impulsivity, substance abuse and never .      In the setting of stability on current psychotropic medication regimen, patient is agreeable with continuing her medications at their current dosages and following up in 3 months for further medication management and optimization.  She was counseled on marijuana abuse and is currently trying to cut back usage previously at 1.25 g now at 1 g a day.  Anxiety and depression are stable on subjective measurements and she feels the medications are helping with her mood stability, motivation, and more recently her success in life both in schooling and dealing with family dynamics.  She will continue with therapy regularly and is agreeable with following up in 3 months.    Labs: Most recently obtained 2/8/2024, reviewed.  Vitamin D on 2/8/24 low at 27.4.   Recheck vitamin D.  Continue monitoring CBC/diff, CMP, lipid profile, hemoglobin A1C, TSH and free T4 every 6 months.  Therapy:  Mervin got a new therapist, CBT focus. Weekly    Medical:  Follow up with primary care physician for ongoing medical care.  Marijuana helps with gastroparesis    Patient was informed of the adverse effects of cannabis  use/abuse on their physical health including: respiratory disease in addition to diminished pulmonary functioning, hypertension, heart disease including arrhythmias and/or myocardial infarction, stroke, weakening of the immune system, attention and concentration problems, behavioral problems like substance induced mood disorders and/or psychosis, increased instances of multiple malignancies possibly including the lungs, head/neck and testicles/ovaries including diminished sexual functioning in addition to problematic reproduction, hyperemesis syndrome and interactions with an individual's medication regimen in addition to risk of intoxication, particularly in the presence of sedative agents like alcohol and/or benzodiazepines. Patient was informed of the possible withdrawal syndrome associated to prevalent and persistent use of cannabis including: headache, rigors, tremulousness, anxiety, anger, irritability, insomnia, fatigue, diminished appetite including possible unintentional decrease in weight and gastrointestinal upset including nausea, vomiting and diarrhea. This writer recommended abstinence from cannabis use/abuse in the presence of the aforementioned effects. Patient was receptive to the recommendations of this writer.        Further Recommendations / Precautions:  At follow-up visit consider initiation of gabapentin to aid in patient's attempts to cut back on marijuana usage as she is not able to cold turkey in the next 3 months.    Not ready to quit today  In past caused drowsiness and she found it hard to remember doses, but she is agreeable with consideration of reinitiating a low-dose.  Abilify: was increased to 7.5 mg while at [facility] resulted in restlessness, possible akathisia   Cymbalta: Trial of lower dose of 60 mg resulted in worsening of anxiety and depression   Ativan: Has been using 1 pill every month for panic attack  prevention    -------------------------------------------------------    History of Present Illness     Today, Grace reports that she is doing well on her current medication regiment and overall feels good control of her anxiety and depression, stating both are present, at low levels, and are not overwhelming in nature.  She is agreeable with continuing the medications and would not like to adjust them at this point in time.  Her difficulties with motivation stem from difficulties with virtual schooling and her marijuana usage and she states she is attempting to cut back on the frequency of her usage, although it does help with gastroparesis, it has a net negative effect and down the line when she does start job searching she realizes that she cannot regularly use marijuana.  She denies using any other substances of abuse and does not have any cravings for alcohol.  She is satisfied with life, satisfied with her psychiatric care, and views herself as a caretaker for family members, and a cornerstone of her household, which she is happy with doing.    Marijuana down to 1g / day, continues to decrease.  Most 1.25 g/day. Doing well in school, FPC done, doing well.    Anxiety: 4/10, never overwhelming, music helps, marijuana helps. Smokes at noon, helps with appetites for gastroparesis.   Home life is frustrating, dads car rolled, dad is coming off psych meds.  Depression: 3/10, feels like low motivation, no SI for years.  [facility], met family  Stressors: denies, family medical issues. Feels like centerpiece of family, plays a big role. Cooks dinners, helps with meds for others.  Sleep: good, 8-10 hrs.  Panic attacks: rare, lorazepam 1x/month.  Job: will start searching soon  Schooling: [employer] for microbiology masters degree.        Psychiatric Review Of Systems:  Grace reports Symptoms as described in HPI.  Grace denies Current suicidal thoughts, plan, or intent, Current  "thoughts of self-harm, or Current homicidal thoughts, plan, or intent.    Medical Review Of Systems:  Complete review of systems is negative except as noted above.    ------------------------------------  Past Medical History:   Diagnosis Date    Anemia     Anxiety     Asthma     hx of as child - outgrew     Depression     Gastritis     Gastroparesis     GERD (gastroesophageal reflux disease)     Lyme disease     Migraine       Past Surgical History:   Procedure Laterality Date    ESOPHAGOGASTRODUODENOSCOPY      EGD with bipsy    UPPER GASTROINTESTINAL ENDOSCOPY         Visit Vitals  LMP 08/26/2024 (Approximate)   OB Status Having periods   Smoking Status Never      Wt Readings from Last 6 Encounters:   09/27/24 71.3 kg (157 lb 3.2 oz)   02/05/24 70 kg (154 lb 6.4 oz)   01/10/24 66.7 kg (147 lb)   01/02/24 66.7 kg (147 lb)   12/27/23 68.9 kg (152 lb)   11/01/23 67.1 kg (148 lb)        Mental Status Exam:  Appearance:  alert, good eye contact, appears stated age, casually dressed, and appropriate grooming and hygiene   Behavior:  calm and cooperative   Motor: no abnormal movements and normal gait and balance   Speech:  spontaneous and coherent   Mood:  \"varied\"   Affect:  constricted   Thought Process:  Organized, logical, goal-directed   Thought Content: no verbalized delusions or overt paranoia   Perceptual disturbances: no reported hallucinations and does not appear to be responding to internal stimuli at this time   Risk Potential: No active or passive suicidal or homicidal ideation was verbalized during interview   Cognition: oriented to self and situation, appears to be of average intelligence, and cognition not formally tested   Insight:  Good   Judgment: Good       Labs & Imaging:  I have personally reviewed all pertinent laboratory tests and imaging results.   Office Visit on 09/27/2024   Component Date Value Ref Range Status    URINE HCG 09/27/2024 negative   Final    Case Report 09/27/2024    Final         "            Value:Surgical Pathology Report                         Case: [id number]                                  Authorizing Provider:  Ban Arzola PA-C        Collected:           09/27/2024 0927              Ordering Location:     [facility]  Received:            09/27/2024 0927                                     Health                                                                       Pathologist:           Kezia Kaba MD                                                             Specimens:   A) - Endocervical, ECC                                                                              B) - Cervix, 3 o'clock                                                                              C) - Cervix, 6 o'clock                                                                              D) - Cervix, 12 o'clock                                                                    Final Diagnosis 09/27/2024    Final                    Value:A. Endocervical, ECC:  - Benign endocervical mucosa  - Minute piece of squamous epithelium, no definitive dysplasia seen    B. Cervix, 3 o'clock:  - Benign squamous and endocervical mucosa    C. Cervix, 6 o'clock:  - High grade squamous intraepithelial lesion (HGSIL), supported by p16/Ki-67 immunostains  - Benign endocervical mucosa     D. Cervix, 12 o'clock:  - Low grade squamous intraepithelial lesion (LGSIL).    - P16/Ki-67 immunostains do not reveal high-grade dysplasia  - Benign endocervical mucosa            Additional Information 09/27/2024    Final                    Value:All reported additional testing was performed with appropriately reactive controls.  These tests were developed and their performance characteristics determined by [facility] or appropriate performing facility, though some tests may be performed on tissues which have not been validated for performance characteristics (such as staining performed on alcohol  "exposed cell blocks and decalcified tissues).  Results should be interpreted with caution and in the context of the patients’ clinical condition. These tests may not be cleared or approved by the U.S. Food and Drug Administration, though the FDA has determined that such clearance or approval is not necessary. These tests are used for clinical purposes and they should not be regarded as investigational or for research. This laboratory has been approved by CLIA 88, designated as a high-complexity laboratory and is qualified to perform these tests.    Interpretation performed at [facility], [address] [place] [address],                           [place] [address]    .      Gross Description 09/27/2024    Final                    Value:A. The specimen is received in formalin, labeled with the patient's name and hospital number, and is designated \" ECC.\"  It consists of a 1.5 x 0.8 x 0.1 cm aggregate of tan soft tissue and mucoid material.  The specimen is submitted in toto in 1 cassette, in an embedding bag.  B. The specimen is received in formalin, labeled with the patient's name and hospital number, and is designated \" cervix 3:00.\"  It consists of a 0.6 cm in greatest dimension rubbery white tissue fragment, admixed with mucoid material.  The specimen is submitted in toto in 1 screened cassette.  C. The specimen is received in formalin, labeled with the patient's name and hospital number, and is designated \" cervix 6:00.\"  It consists of a 0.7 cm in greatest dimension rubbery tan tissue fragment, admixed with mucoid material.  The specimen is submitted in toto in 1 screened cassette.  D. The specimen is received in formalin, labeled with the patient's name and hospital number, and is designated \" cervix 12:00.\"                            It consists of a 0.7 cm in greatest dimension rubbery tan-white tissue fragment, admixed with mucoid material.  The specimen is submitted in toto in 1 screened cassette.    Note: The " estimated total formalin fixation time based upon information provided by the submitting clinician and the standard processing schedule is over 72 hours.    MScheib      Clinical Information 09/27/2024    Final                    Value:High Grade    N gonorrhoeae, DNA Probe 09/27/2024 Negative  Negative Final    Chlamydia trachomatis, DNA Probe 09/27/2024 Negative  Negative Final       Meds/Allergies    Allergies   Allergen Reactions    Pineapple - Food Allergy Anaphylaxis    Penicillins Hives and Vomiting     Current Outpatient Medications   Medication Instructions    albuterol (ProAir HFA) 90 mcg/act inhaler 2 puffs, Inhalation, Every 6 hours PRN    ARIPiprazole (ABILIFY) 5 mg, Oral, Daily    DULoxetine (CYMBALTA) 30 mg delayed release capsule TAKE 1 CAPSULE BY MOUTH EVERY DAY IN THE MORNING WITH 60MG CAPSULE FOR TOTAL DOSE OF 90MG    DULoxetine (CYMBALTA) 60 mg delayed release capsule Take 90 mg (one 30 mg tablet and one 60 mg tablet) every morning for a total of 90 mg daily    levonorgestrel-ethinyl estradiol (Marlissa) 0.15-30 MG-MCG per tablet 1 tablet, Oral, Daily    LORazepam (ATIVAN) 1 mg, Oral, Daily PRN    metoprolol succinate (TOPROL-XL) 25 mg, Oral, Daily    traZODone (DESYREL) 100 mg, Oral, Daily at bedtime,       Vitamin D3 1,000 Units, Oral, Daily        -------------------------------------------------------    Medical Decision Making / Counseling / Coordination of Care:  The Treatment Plan was completed and signed..     The following interventions are recommended: return in 3 months for follow up or sooner if needed, continue psychotherapy, and contracts for safety at present - agrees to call Crisis Intervention Service or go to ED if feeling unsafe. Individual supportive psychotherapy was provided.     Although patient's acute lethality risk is LOW, long-term/chronic lethality risk is mildly elevated given the risk factors listed above. However, at the current moment, Grace is future-oriented,  forward-thinking, and demonstrates ability to act in a self-preserving manner as evidenced by volitionally seeking psychiatric evaluation and treatment today. To mitigate future risk, patient should adhere to treatment recommendations, avoid alcohol/illicit substance use, utilize community-based resources and familiar support, and prioritize mental health treatment. The diagnosis and treatment plan were reviewed with the patient. Risks, benefits, and alternatives to treatment were discussed. The importance of medication and treatment compliance was reviewed with the patient.   PARQ for neurontin including depression/suicidality, allergic reactions (SJS, angioedema, rhabdomyolysis, eosinophilia, anaphylaxis), dizziness and somnolence, diarrhea, xerostomia, headaches, drug interactions and others.   PARQ completed including serotonin syndrome, SIADH, worsened depression/suicidality, induction of fco, blood pressure changes and GI distress, weight gain, sexual side effects, insomnia, sedation, potential for drug interactions, and others.   PARQ for neurontin including depression/suicidality, allergic reactions (SJS, angioedema, rhabdomyolysis, eosinophilia, anaphylaxis), dizziness and somnolence, diarrhea, xerostomia, headaches, drug interactions and others.   Discussed with patient the risks of sedation, respiratory depression, impairment in judgment and motor function. Depression, mood changes, GI changes, and others discussed. Patient was also informed of risks of being on or starting opioid medications due to drug interactions and potential for serious respiratory depression and death.      Controlled Medication Discussion:   Grace has been filling controlled prescriptions on time as prescribed   Discussed with Grace the risks of sedation, respiratory depression, impairment of ability to drive and potential for abuse and addiction related to treatment with benzodiazepine medications. She understands risk of  treatment with benzodiazepine medications, agrees to not drive if feels impaired and agrees to take medications as prescribed  Discussed with Grace Black Box warning on concurrent use of benzodiazepines and opioid medications including sedation, respiratory depression, coma and death. She understands the risk of treatment with benzodiazepines in addition to opioids and wants to continue taking those medications  Discussed with Grace increased risk of impairment related to concurrent use of benzodiazepines and hypnotic medications including excessive sedation, psychomotor impairment and respiratory depression. She understands the risk of treatment with benzodiazepines in addition to hypnotic medications and wants to continue taking those medications  Discussed with Grace need to slowly taper off benzodiazepines as recommended by [place] Prescription Drug Monitoring Program, due to concurrent use of opioid medications and increased risk of sedation, respiratory depression, coma and death with that combination    PDMP Review         Value Time User    PDMP Reviewed  Yes 10/9/2024 11:32 AM Shad Agrawal MD          -------------------------------------------------------    Historical Information:  Information is copied from the previous visit and updated today as appropriate.    Past Psychiatric History:    No significant PPH, no past psychiatric hospitalizations, no past suicide attempts, no h/o self-injurious behaviors, teen superficial cutting 2 months in setting of anxiety, jan 2022 ago cut again due to dropping out of masters program because disliked it, no h/o physical altercations. Currently in outpatient therapy through [facility] on weekly basis.    Past Med Trials: Fluoxetine up to 40 mg daily (ineffective after some time), Seroquel XR up to 150 mg qhs (drowsiness), gabapentin (drowsy, hard to remember doses), zoloft (ineffective after some time)        Family Psychiatric History:     Father- Bipolar I d/o (on Prozac, Seroquel)  Pat. Great Grandmother- Schizophrenia  Mother- Panic Attacks (Xanax, previously on Zoloft)  Mat. Grandmother- Anxiety  Maternal Side- Chronic abdominal pain     Denies substance abuse or suicidality in immediate relations.         Social History:  Domiciled with parents, brother (12 y/o) in [place]. Mother employed in [employer], father employed as pharmaceutical consultant. Reports infrequent caffeine use. No active substance use. Reports that she broke up with boyfriend in 2/2021.  No current relationships.        Substance Abuse History:  Alcohol- drinking occasionally on weekends, a couple of drinks, about once per week, limited alcohol use  Cannabis- Daily use morning until sleep, for anxiety.  No desire to quit, medical MJ she reports.        Traumatic History:  Denies any h/o physical or sexual abuse. The following portions of the patient's history were reviewed and updated as appropriate: allergies, current medications, past family history, past medical history, past social history, past surgical history and problem list.     Denies access to lethal means / firearms.      This note was not shared with the patient due to reasonable likelihood of causing patient harm     Note: This chart was completed utilizing speech software.  Grammatical errors, random word insertions, pronoun errors, and incomplete sentences are an occasional consequence of the system due to software limitation, ambient noise, and hardware issues.  Any formal questions or concerns about the context, text, or information contained within the body of this dictation should be directly addressed to the physician for clarification.    Shad Agrawal MD

## 2024-10-08 NOTE — BH TREATMENT PLAN
TREATMENT PLAN        [facility]    Name and Date of Birth:  Grace Doherty 24 y.o. 2000  Date of Treatment Plan: October 9, 2024  Diagnosis/Diagnoses:    1. MDD (major depressive disorder), recurrent episode, mild (HCC)    2. JADEN (generalized anxiety disorder)    3. Insomnia, unspecified type    4. Cannabis abuse, daily use    5. Screening for endocrine, nutritional, metabolic and immunity disorder    6. Anxiety disorder, unspecified type         Strengths/Personal Resources for Self-Care:  taking medications as prescribed, ability to adapt to life changes, ability to communicate needs, ability to communicate well, ability to listen, ability to reason, ability to understand psychiatric illness, financial means, independence, motivation for treatment, ability to negotiate basic needs, being resoureceful, self-reliance, well educated, willingness to work on problems, work skills     Area/Areas of need: anxiety, anxiety symptoms, depression, depressive symptoms, mood instability, sleep problems, lack of energy, lack of motivation, lack of sleep, stress at work, substance abuse, unstable mood     Long Term Goal: improve control of acceptable anxiety level  Target Date: 6 months - April 9, 2025  Person/Persons responsible for completion of goal: Neal Agrawal MD     Short Term Objective (s) - How will we reach this goal?:   Take medications as prescribed  Attend psychiatry appointments regularly  Get blood work drawn  Continue psychotherapy regularly  Practice coping skills  Try sleep hygiene techniques  Avoid alcohol   Avoid drugs   Take walks regularly  Try breathing exercises  Try relaxation techniques  Target Date: 6 months - April 9, 2025  Person/Persons Responsible for Completion of Goal: Grace     Progress Towards Goals: Continuing treatment    Treatment Modality: medication management every 1-3 months as needed and continue psychotherapy  Review  due 180 days from date of this plan: April 7, 2025   Expected length of service: Ongoing treatment    My physician and I have developed this plan together, and I agree to work on the goals and objectives. I understand the treatment goals that were developed for my treatment.    The treatment plan was created between Shad Agrawal MD and Grace Doherty on 10/09/24 at 11:53 AM but not signed at the time of the visit due to COVID social distancing. The plan was reviewed, and verbal consent was given.

## 2024-10-09 ENCOUNTER — OFFICE VISIT (OUTPATIENT)
Dept: PSYCHIATRY | Facility: CLINIC | Age: 24
End: 2024-10-09
Payer: COMMERCIAL

## 2024-10-09 ENCOUNTER — TELEPHONE (OUTPATIENT)
Dept: OBGYN CLINIC | Facility: CLINIC | Age: 24
End: 2024-10-09

## 2024-10-09 DIAGNOSIS — F41.9 ANXIETY DISORDER, UNSPECIFIED TYPE: ICD-10-CM

## 2024-10-09 DIAGNOSIS — Z13.29 SCREENING FOR ENDOCRINE, NUTRITIONAL, METABOLIC AND IMMUNITY DISORDER: ICD-10-CM

## 2024-10-09 DIAGNOSIS — Z13.228 SCREENING FOR ENDOCRINE, NUTRITIONAL, METABOLIC AND IMMUNITY DISORDER: ICD-10-CM

## 2024-10-09 DIAGNOSIS — F41.1 GAD (GENERALIZED ANXIETY DISORDER): ICD-10-CM

## 2024-10-09 DIAGNOSIS — Z13.0 SCREENING FOR ENDOCRINE, NUTRITIONAL, METABOLIC AND IMMUNITY DISORDER: ICD-10-CM

## 2024-10-09 DIAGNOSIS — F12.10 CANNABIS ABUSE, DAILY USE: ICD-10-CM

## 2024-10-09 DIAGNOSIS — F33.1 MDD (MAJOR DEPRESSIVE DISORDER), RECURRENT EPISODE, MODERATE (HCC): Primary | ICD-10-CM

## 2024-10-09 DIAGNOSIS — Z13.21 SCREENING FOR ENDOCRINE, NUTRITIONAL, METABOLIC AND IMMUNITY DISORDER: ICD-10-CM

## 2024-10-09 DIAGNOSIS — G47.00 INSOMNIA, UNSPECIFIED TYPE: ICD-10-CM

## 2024-10-09 PROCEDURE — 99214 OFFICE O/P EST MOD 30 MIN: CPT | Performed by: PSYCHIATRY & NEUROLOGY

## 2024-10-09 PROCEDURE — 90833 PSYTX W PT W E/M 30 MIN: CPT | Performed by: PSYCHIATRY & NEUROLOGY

## 2024-10-09 RX ORDER — DULOXETIN HYDROCHLORIDE 30 MG/1
CAPSULE, DELAYED RELEASE ORAL
Qty: 30 CAPSULE | Refills: 2 | Status: SHIPPED | OUTPATIENT
Start: 2024-10-09

## 2024-10-09 RX ORDER — DULOXETIN HYDROCHLORIDE 60 MG/1
CAPSULE, DELAYED RELEASE ORAL
Qty: 30 CAPSULE | Refills: 2 | Status: SHIPPED | OUTPATIENT
Start: 2024-10-09

## 2024-10-09 RX ORDER — LORAZEPAM 1 MG/1
1 TABLET ORAL DAILY PRN
Qty: 30 TABLET | Refills: 2 | Status: SHIPPED | OUTPATIENT
Start: 2024-10-09

## 2024-10-09 RX ORDER — ARIPIPRAZOLE 5 MG/1
5 TABLET ORAL DAILY
Qty: 90 TABLET | Refills: 0 | Status: SHIPPED | OUTPATIENT
Start: 2024-10-09 | End: 2024-10-18 | Stop reason: SDUPTHER

## 2024-10-09 RX ORDER — TRAZODONE HYDROCHLORIDE 100 MG/1
100 TABLET ORAL
Qty: 90 TABLET | Refills: 1 | Status: SHIPPED | OUTPATIENT
Start: 2024-10-09

## 2024-10-09 NOTE — TELEPHONE ENCOUNTER
the patient has seen Dr. Madsen and Dr. Genao in the past     .[facility] GYN Department  Surgery Scheduling Sheet    Patient Name: Grace Doherty  : 2000    Provider: Dr. Ariella Madsen / Ban Arzola PA-C     Needed: no; Language: N/A    Procedure: exam under anesthesia and loop electrosurgical excision procedure    Diagnosis: HGSIL     Special Needs or Equipment: none    Anesthesia: IV sedation with anesthesia    Length of stay: outpatient  -Does patient have comorbid conditions that will require close perioperative monitoring prior to safe discharge: no    -The patient has comorbid conditions that will require close perioperative monitoring prior to safe discharge, including N/A.   -This may require acute care beyond the usual and routine recovery period. As such, inpatient admission post-operatively is expected and appropriate, and anticipated hospital length of stay will be >2 midnights.    Pre-Admission Testing Needed: no   Labs that should be ordered: urine pregnancy test    Order PAT that is recommended in prep for procedure?: Not Indicated    Medical Clearance Needed: no; Provider: N/A    MA Form Signed (tubals/hysterectomy): Not Indicated    Surgical Drink Given: no     How many days out of work: 1 day(s)     How many days no drivin day(s)       Is pre op appt needed?  yes  Interval for post op appt: 2 week(s)     For Surgical Scheduler:     Surgery Scheduled On: MON. 24-AFTERNOON  [place]: [facility]    Pre-op Appt: 10/28/24  Post op Appt: 24  Consult/Medical clearance appt: N/A

## 2024-10-09 NOTE — TELEPHONE ENCOUNTER
Pt returned call.   Surgery is now scheduled.   Please see the [facility] OB GYN Department Surgery Scheduling Sheet in this encounter for further information.

## 2024-10-09 NOTE — TELEPHONE ENCOUNTER
.Called pt, no answer.   Left VM for pt to return my call at my direct number (#[phone]) to discuss scheduling surgery.

## 2024-10-17 DIAGNOSIS — F41.1 GAD (GENERALIZED ANXIETY DISORDER): ICD-10-CM

## 2024-10-17 DIAGNOSIS — G47.00 INSOMNIA, UNSPECIFIED TYPE: ICD-10-CM

## 2024-10-17 DIAGNOSIS — F33.1 MDD (MAJOR DEPRESSIVE DISORDER), RECURRENT EPISODE, MODERATE (HCC): Primary | ICD-10-CM

## 2024-10-18 DIAGNOSIS — G47.00 INSOMNIA, UNSPECIFIED TYPE: ICD-10-CM

## 2024-10-18 DIAGNOSIS — F33.1 MDD (MAJOR DEPRESSIVE DISORDER), RECURRENT EPISODE, MODERATE (HCC): ICD-10-CM

## 2024-10-18 DIAGNOSIS — F41.1 GAD (GENERALIZED ANXIETY DISORDER): ICD-10-CM

## 2024-10-18 RX ORDER — ARIPIPRAZOLE 5 MG/1
5 TABLET ORAL DAILY
Qty: 90 TABLET | Refills: 0 | Status: SHIPPED | OUTPATIENT
Start: 2024-10-18 | End: 2025-01-16

## 2024-10-18 NOTE — TELEPHONE ENCOUNTER
Medication:   ARIPiprazole (ABILIFY)      Dose/Frequency: 5 mg tablet Take 1 tablet (5 mg total) by mouth daily     Quantity: 90 tablet     Pharmacy:   [address] [place] [address]   [place] [address]   Store number: [id number]   P: [phone]  F: [phone]    Office:   [] PCP/Provider -   [x] Speciality/Provider -     Does the patient have enough for 3 days?   [] Yes   [x] No - Send as HP to POD

## 2024-10-18 NOTE — TELEPHONE ENCOUNTER
Forwarding to a covering provider for review. The script written a week ago doesn't look like it was attached to a pharmacy.

## 2024-10-21 RX ORDER — DULOXETIN HYDROCHLORIDE 30 MG/1
CAPSULE, DELAYED RELEASE ORAL
Qty: 90 CAPSULE | Refills: 1 | Status: SHIPPED | OUTPATIENT
Start: 2024-10-21

## 2024-10-21 NOTE — TELEPHONE ENCOUNTER
Patient requested refill of :  Requested Prescriptions     Pending Prescriptions Disp Refills    DULoxetine (CYMBALTA) 30 mg delayed release capsule [Pharmacy Med Name: DULOXETINE HCL DR 30 MG CAP] 90 capsule 1     Sig: TAKE 1 CAPSULE BY MOUTH EVERY DAY IN THE MORNING WITH 60MG CAPSULE FOR TOTAL DOSE OF 90MG         Refill request approved and sent to pharmacy on file per patient request.     Shad Agrawal MD

## 2024-10-26 DIAGNOSIS — Z30.41 ENCOUNTER FOR SURVEILLANCE OF CONTRACEPTIVE PILLS: ICD-10-CM

## 2024-10-28 ENCOUNTER — CONSULT (OUTPATIENT)
Dept: OBGYN CLINIC | Facility: CLINIC | Age: 24
End: 2024-10-28
Payer: COMMERCIAL

## 2024-10-28 VITALS
HEIGHT: 65 IN | BODY MASS INDEX: 26.92 KG/M2 | WEIGHT: 161.6 LBS | SYSTOLIC BLOOD PRESSURE: 108 MMHG | DIASTOLIC BLOOD PRESSURE: 64 MMHG

## 2024-10-28 DIAGNOSIS — R87.613 HGSIL ON CYTOLOGIC SMEAR OF CERVIX: ICD-10-CM

## 2024-10-28 DIAGNOSIS — Z01.818 PREOPERATIVE EXAM FOR GYNECOLOGIC SURGERY: Primary | ICD-10-CM

## 2024-10-28 PROBLEM — F41.1 GAD (GENERALIZED ANXIETY DISORDER): Status: RESOLVED | Noted: 2023-04-03 | Resolved: 2024-10-28

## 2024-10-28 PROBLEM — Z00.00 ANNUAL PHYSICAL EXAM: Status: RESOLVED | Noted: 2020-06-18 | Resolved: 2024-10-28

## 2024-10-28 PROCEDURE — 99213 OFFICE O/P EST LOW 20 MIN: CPT | Performed by: OBSTETRICS & GYNECOLOGY

## 2024-10-28 RX ORDER — LEVONORGESTREL AND ETHINYL ESTRADIOL 0.15-0.03
1 KIT ORAL DAILY
Qty: 84 TABLET | Refills: 0 | Status: SHIPPED | OUTPATIENT
Start: 2024-10-28

## 2024-10-28 RX ORDER — LEVONORGESTREL AND ETHINYL ESTRADIOL 0.15-0.03
1 KIT ORAL DAILY
Qty: 84 TABLET | Refills: 1 | Status: SHIPPED | OUTPATIENT
Start: 2024-10-28 | End: 2024-10-28

## 2024-10-28 NOTE — H&P (VIEW-ONLY)
Assessment /Plan:     24 y.o.  with Patient's last menstrual period was 2024. with  HGSIL   for LEEP .  The risks (infection, bleeding, transfusion, damage to adjacent organs requiring immediate and/or delayed repair, clots inside blood vessels, need to complete procedure using alternative approach, procedural failure, worsening of pre-exisiting medical condition, and death) and alternatives  have been discussed and patient desires to proceed with LEEP at [facility] on 2024 .    Consent was reviewed in detail and signed today  Preoperative testing was discussed   Postoperative course discussed and post op medications were reviewed     I have spent a total time of 25 minutes in caring for this patient on the day of the visit/encounter including Diagnostic results, Prognosis, Risks and benefits of tx options, Importance of tx compliance, Impressions, Counseling / Coordination of care, and Documenting in the medical record.      Chief Complaint: HGSIL on pap and colpo biospy    HPI:  Pt is a 24 y.o.  with Patient's last menstrual period was 2024. using  OCPs  for contraception presents c/o after having HSIL on colpo biopsy.  She is scheduled for a LEEP    PMHx:   Past Medical History:   Diagnosis Date    Anemia     Anxiety     Asthma     hx of as child - outgrew     Depression     Gastritis     Gastroparesis     GERD (gastroesophageal reflux disease)     Lyme disease     Migraine        PSHx:   Past Surgical History:   Procedure Laterality Date    ESOPHAGOGASTRODUODENOSCOPY      EGD with bipsy    UPPER GASTROINTESTINAL ENDOSCOPY         Meds: Not in a hospital admission.    Allergies:   Allergies   Allergen Reactions    Pineapple - Food Allergy Anaphylaxis    Penicillins Hives and Vomiting       Social Hx:    Social History     Socioeconomic History    Marital status: Single     Spouse name: Not on file    Number of children: Not on file    Years of education: Not on file    Highest  education level: Not on file   Occupational History    Not on file   Tobacco Use    Smoking status: Never    Smokeless tobacco: Never   Vaping Use    Vaping status: Never Used   Substance and Sexual Activity    Alcohol use: Yes     Alcohol/week: 2.0 standard drinks of alcohol     Types: 2 Standard drinks or equivalent per week     Comment: Socially    Drug use: Yes     Frequency: 7.0 times per week     Types: Marijuana     Comment: Medical Marijuana    Sexual activity: Yes     Partners: Male     Birth control/protection: OCP   Other Topics Concern    Not on file   Social History Narrative    Caffeine use    Currently in college    Poor dental hygiene    Tea      Social Determinants of Health     Financial Resource Strain: Not on file   Food Insecurity: Not on file   Transportation Needs: Not on file   Physical Activity: Not on file   Stress: Not on file   Social Connections: Not on file   Intimate Partner Violence: Not on file   Housing Stability: Not on file       Ob Hx:   OB History    Para Term  AB Living   0 0 0 0 0 0   SAB IAB Ectopic Multiple Live Births   0 0 0 0 0   Obstetric Comments   Menarche 12       Gyn HX:  denies STD, abnormal pap, significant dysmenorrhea or irregular menses    Fm Hx:   Family History   Problem Relation Age of Onset    Anxiety disorder Mother     Miscarriages / Stillbirths Mother     Bipolar disorder Father     Sleep disorder Father     Anxiety disorder Maternal Grandmother     Hyperlipidemia Maternal Grandmother     Hypertension Maternal Grandmother     Thyroid disease Maternal Grandmother     Ovarian cancer Maternal Grandmother     Miscarriages / Stillbirths Maternal Grandmother     Lung cancer Maternal Grandfather     Kidney disease Paternal Grandmother     Prostate cancer Paternal Grandfather     Skin cancer Paternal Grandfather     Breast cancer Maternal Aunt     Breast cancer Other     Substance Abuse Neg Hx        ROS:  Review of Systems   Constitutional:  "Negative.    HENT: Negative.     Respiratory: Negative.     Cardiovascular: Negative.    Gastrointestinal: Negative.    Genitourinary: Negative.    Neurological: Negative.    Psychiatric/Behavioral: Negative.         VS:  /64 (BP Location: Left arm, Patient Position: Sitting, Cuff Size: Standard)   Ht 5' 5\" (1.651 m)   Wt 73.3 kg (161 lb 9.6 oz)   LMP 09/30/2024   BMI 26.89 kg/m²       Exam:    Physical Exam  Vitals and nursing note reviewed.   Constitutional:       Appearance: Normal appearance. She is normal weight.   HENT:      Head: Normocephalic and atraumatic.   Eyes:      Extraocular Movements: Extraocular movements intact.      Conjunctiva/sclera: Conjunctivae normal.      Pupils: Pupils are equal, round, and reactive to light.   Cardiovascular:      Rate and Rhythm: Normal rate and regular rhythm.      Heart sounds: Normal heart sounds. No murmur heard.  Pulmonary:      Effort: Pulmonary effort is normal. No respiratory distress.      Breath sounds: Normal breath sounds. No wheezing or rales.   Abdominal:      General: Abdomen is flat.   Musculoskeletal:      Right lower leg: No edema.      Left lower leg: No edema.   Skin:     General: Skin is warm and dry.   Neurological:      General: No focal deficit present.      Mental Status: She is alert and oriented to person, place, and time.   Psychiatric:         Mood and Affect: Mood normal.         Behavior: Behavior normal.              /64 (BP Location: Left arm, Patient Position: Sitting, Cuff Size: Standard)   Ht 5' 5\" (1.651 m)   Wt 73.3 kg (161 lb 9.6 oz)   LMP 09/30/2024   BMI 26.89 kg/m²       "

## 2024-10-28 NOTE — PROGRESS NOTES
Assessment /Plan:     24 y.o.  with Patient's last menstrual period was 2024. with  HGSIL   for LEEP .  The risks (infection, bleeding, transfusion, damage to adjacent organs requiring immediate and/or delayed repair, clots inside blood vessels, need to complete procedure using alternative approach, procedural failure, worsening of pre-exisiting medical condition, and death) and alternatives  have been discussed and patient desires to proceed with LEEP at [facility] on 2024 .    Consent was reviewed in detail and signed today  Preoperative testing was discussed   Postoperative course discussed and post op medications were reviewed     I have spent a total time of 25 minutes in caring for this patient on the day of the visit/encounter including Diagnostic results, Prognosis, Risks and benefits of tx options, Importance of tx compliance, Impressions, Counseling / Coordination of care, and Documenting in the medical record.      Chief Complaint: HGSIL on pap and colpo biospy    HPI:  Pt is a 24 y.o.  with Patient's last menstrual period was 2024. using  OCPs  for contraception presents c/o after having HSIL on colpo biopsy.  She is scheduled for a LEEP    PMHx:   Past Medical History:   Diagnosis Date    Anemia     Anxiety     Asthma     hx of as child - outgrew     Depression     Gastritis     Gastroparesis     GERD (gastroesophageal reflux disease)     Lyme disease     Migraine        PSHx:   Past Surgical History:   Procedure Laterality Date    ESOPHAGOGASTRODUODENOSCOPY      EGD with bipsy    UPPER GASTROINTESTINAL ENDOSCOPY         Meds: Not in a hospital admission.    Allergies:   Allergies   Allergen Reactions    Pineapple - Food Allergy Anaphylaxis    Penicillins Hives and Vomiting       Social Hx:    Social History     Socioeconomic History    Marital status: Single     Spouse name: Not on file    Number of children: Not on file    Years of education: Not on file    Highest  education level: Not on file   Occupational History    Not on file   Tobacco Use    Smoking status: Never    Smokeless tobacco: Never   Vaping Use    Vaping status: Never Used   Substance and Sexual Activity    Alcohol use: Yes     Alcohol/week: 2.0 standard drinks of alcohol     Types: 2 Standard drinks or equivalent per week     Comment: Socially    Drug use: Yes     Frequency: 7.0 times per week     Types: Marijuana     Comment: Medical Marijuana    Sexual activity: Yes     Partners: Male     Birth control/protection: OCP   Other Topics Concern    Not on file   Social History Narrative    Caffeine use    Currently in college    Poor dental hygiene    Tea      Social Determinants of Health     Financial Resource Strain: Not on file   Food Insecurity: Not on file   Transportation Needs: Not on file   Physical Activity: Not on file   Stress: Not on file   Social Connections: Not on file   Intimate Partner Violence: Not on file   Housing Stability: Not on file       Ob Hx:   OB History    Para Term  AB Living   0 0 0 0 0 0   SAB IAB Ectopic Multiple Live Births   0 0 0 0 0   Obstetric Comments   Menarche 12       Gyn HX:  denies STD, abnormal pap, significant dysmenorrhea or irregular menses    Fm Hx:   Family History   Problem Relation Age of Onset    Anxiety disorder Mother     Miscarriages / Stillbirths Mother     Bipolar disorder Father     Sleep disorder Father     Anxiety disorder Maternal Grandmother     Hyperlipidemia Maternal Grandmother     Hypertension Maternal Grandmother     Thyroid disease Maternal Grandmother     Ovarian cancer Maternal Grandmother     Miscarriages / Stillbirths Maternal Grandmother     Lung cancer Maternal Grandfather     Kidney disease Paternal Grandmother     Prostate cancer Paternal Grandfather     Skin cancer Paternal Grandfather     Breast cancer Maternal Aunt     Breast cancer Other     Substance Abuse Neg Hx        ROS:  Review of Systems   Constitutional:  "Negative.    HENT: Negative.     Respiratory: Negative.     Cardiovascular: Negative.    Gastrointestinal: Negative.    Genitourinary: Negative.    Neurological: Negative.    Psychiatric/Behavioral: Negative.         VS:  /64 (BP Location: Left arm, Patient Position: Sitting, Cuff Size: Standard)   Ht 5' 5\" (1.651 m)   Wt 73.3 kg (161 lb 9.6 oz)   LMP 09/30/2024   BMI 26.89 kg/m²       Exam:    Physical Exam  Vitals and nursing note reviewed.   Constitutional:       Appearance: Normal appearance. She is normal weight.   HENT:      Head: Normocephalic and atraumatic.   Eyes:      Extraocular Movements: Extraocular movements intact.      Conjunctiva/sclera: Conjunctivae normal.      Pupils: Pupils are equal, round, and reactive to light.   Cardiovascular:      Rate and Rhythm: Normal rate and regular rhythm.      Heart sounds: Normal heart sounds. No murmur heard.  Pulmonary:      Effort: Pulmonary effort is normal. No respiratory distress.      Breath sounds: Normal breath sounds. No wheezing or rales.   Abdominal:      General: Abdomen is flat.   Musculoskeletal:      Right lower leg: No edema.      Left lower leg: No edema.   Skin:     General: Skin is warm and dry.   Neurological:      General: No focal deficit present.      Mental Status: She is alert and oriented to person, place, and time.   Psychiatric:         Mood and Affect: Mood normal.         Behavior: Behavior normal.              /64 (BP Location: Left arm, Patient Position: Sitting, Cuff Size: Standard)   Ht 5' 5\" (1.651 m)   Wt 73.3 kg (161 lb 9.6 oz)   LMP 09/30/2024   BMI 26.89 kg/m²       "

## 2024-11-07 ENCOUNTER — ANESTHESIA EVENT (OUTPATIENT)
Dept: PERIOP | Facility: HOSPITAL | Age: 24
End: 2024-11-07
Payer: COMMERCIAL

## 2024-11-08 ENCOUNTER — APPOINTMENT (OUTPATIENT)
Dept: LAB | Facility: CLINIC | Age: 24
End: 2024-11-08
Payer: COMMERCIAL

## 2024-11-08 DIAGNOSIS — Z13.0 SCREENING FOR IRON DEFICIENCY ANEMIA: ICD-10-CM

## 2024-11-08 DIAGNOSIS — Z13.21 SCREENING FOR ENDOCRINE, NUTRITIONAL, METABOLIC AND IMMUNITY DISORDER: ICD-10-CM

## 2024-11-08 DIAGNOSIS — Z13.21 SCREENING FOR MALNUTRITION: ICD-10-CM

## 2024-11-08 DIAGNOSIS — R53.83 FATIGUE, UNSPECIFIED TYPE: ICD-10-CM

## 2024-11-08 DIAGNOSIS — Z13.29 SCREENING FOR ENDOCRINE, NUTRITIONAL, METABOLIC AND IMMUNITY DISORDER: ICD-10-CM

## 2024-11-08 DIAGNOSIS — Z13.228 SCREENING FOR PHENYLKETONURIA (PKU): ICD-10-CM

## 2024-11-08 DIAGNOSIS — Z13.228 SCREENING FOR ENDOCRINE, NUTRITIONAL, METABOLIC AND IMMUNITY DISORDER: ICD-10-CM

## 2024-11-08 DIAGNOSIS — Z13.29 SCREENING FOR THYROID DISORDER: Primary | ICD-10-CM

## 2024-11-08 DIAGNOSIS — Z00.6 ENCOUNTER FOR EXAMINATION FOR NORMAL COMPARISON OR CONTROL IN CLINICAL RESEARCH PROGRAM: ICD-10-CM

## 2024-11-08 DIAGNOSIS — Z86.16 HISTORY OF COVID-19: ICD-10-CM

## 2024-11-08 DIAGNOSIS — Z13.0 SCREENING FOR ENDOCRINE, NUTRITIONAL, METABOLIC AND IMMUNITY DISORDER: ICD-10-CM

## 2024-11-08 LAB
25(OH)D3 SERPL-MCNC: 45.7 NG/ML (ref 30–100)
ALBUMIN SERPL BCG-MCNC: 4.2 G/DL (ref 3.5–5)
ALP SERPL-CCNC: 69 U/L (ref 34–104)
ALT SERPL W P-5'-P-CCNC: 13 U/L (ref 7–52)
ANION GAP SERPL CALCULATED.3IONS-SCNC: 8 MMOL/L (ref 4–13)
AST SERPL W P-5'-P-CCNC: 16 U/L (ref 13–39)
BASOPHILS # BLD AUTO: 0.05 THOUSANDS/ÂΜL (ref 0–0.1)
BASOPHILS NFR BLD AUTO: 1 % (ref 0–1)
BILIRUB SERPL-MCNC: 0.23 MG/DL (ref 0.2–1)
BUN SERPL-MCNC: 10 MG/DL (ref 5–25)
CALCIUM SERPL-MCNC: 9.2 MG/DL (ref 8.4–10.2)
CHLORIDE SERPL-SCNC: 105 MMOL/L (ref 96–108)
CHOLEST SERPL-MCNC: 184 MG/DL
CO2 SERPL-SCNC: 28 MMOL/L (ref 21–32)
CREAT SERPL-MCNC: 0.78 MG/DL (ref 0.6–1.3)
EOSINOPHIL # BLD AUTO: 0.05 THOUSAND/ÂΜL (ref 0–0.61)
EOSINOPHIL NFR BLD AUTO: 1 % (ref 0–6)
ERYTHROCYTE [DISTWIDTH] IN BLOOD BY AUTOMATED COUNT: 12.8 % (ref 11.6–15.1)
EST. AVERAGE GLUCOSE BLD GHB EST-MCNC: 103 MG/DL
GFR SERPL CREATININE-BSD FRML MDRD: 105 ML/MIN/1.73SQ M
GLUCOSE P FAST SERPL-MCNC: 89 MG/DL (ref 65–99)
HBA1C MFR BLD: 5.2 %
HCT VFR BLD AUTO: 43.6 % (ref 34.8–46.1)
HDLC SERPL-MCNC: 80 MG/DL
HGB BLD-MCNC: 13.9 G/DL (ref 11.5–15.4)
IMM GRANULOCYTES # BLD AUTO: 0.03 THOUSAND/UL (ref 0–0.2)
IMM GRANULOCYTES NFR BLD AUTO: 0 % (ref 0–2)
LDLC SERPL CALC-MCNC: 82 MG/DL (ref 0–100)
LYMPHOCYTES # BLD AUTO: 2.4 THOUSANDS/ÂΜL (ref 0.6–4.47)
LYMPHOCYTES NFR BLD AUTO: 30 % (ref 14–44)
MCH RBC QN AUTO: 29.2 PG (ref 26.8–34.3)
MCHC RBC AUTO-ENTMCNC: 31.9 G/DL (ref 31.4–37.4)
MCV RBC AUTO: 92 FL (ref 82–98)
MONOCYTES # BLD AUTO: 0.42 THOUSAND/ÂΜL (ref 0.17–1.22)
MONOCYTES NFR BLD AUTO: 5 % (ref 4–12)
NEUTROPHILS # BLD AUTO: 5.05 THOUSANDS/ÂΜL (ref 1.85–7.62)
NEUTS SEG NFR BLD AUTO: 63 % (ref 43–75)
NONHDLC SERPL-MCNC: 104 MG/DL
NRBC BLD AUTO-RTO: 0 /100 WBCS
PLATELET # BLD AUTO: 307 THOUSANDS/UL (ref 149–390)
PMV BLD AUTO: 10.2 FL (ref 8.9–12.7)
POTASSIUM SERPL-SCNC: 4.1 MMOL/L (ref 3.5–5.3)
PROT SERPL-MCNC: 7.4 G/DL (ref 6.4–8.4)
RBC # BLD AUTO: 4.76 MILLION/UL (ref 3.81–5.12)
SODIUM SERPL-SCNC: 141 MMOL/L (ref 135–147)
T4 FREE SERPL-MCNC: 0.6 NG/DL (ref 0.61–1.12)
TRIGL SERPL-MCNC: 110 MG/DL
TSH SERPL DL<=0.05 MIU/L-ACNC: 2.69 UIU/ML (ref 0.45–4.5)
WBC # BLD AUTO: 8 THOUSAND/UL (ref 4.31–10.16)

## 2024-11-08 PROCEDURE — 36415 COLL VENOUS BLD VENIPUNCTURE: CPT

## 2024-11-08 PROCEDURE — 80053 COMPREHEN METABOLIC PANEL: CPT

## 2024-11-08 PROCEDURE — 80061 LIPID PANEL: CPT

## 2024-11-08 PROCEDURE — 85025 COMPLETE CBC W/AUTO DIFF WBC: CPT

## 2024-11-08 PROCEDURE — 84443 ASSAY THYROID STIM HORMONE: CPT

## 2024-11-08 PROCEDURE — 83036 HEMOGLOBIN GLYCOSYLATED A1C: CPT

## 2024-11-08 PROCEDURE — 84439 ASSAY OF FREE THYROXINE: CPT

## 2024-11-08 PROCEDURE — 82306 VITAMIN D 25 HYDROXY: CPT

## 2024-11-11 ENCOUNTER — HOSPITAL ENCOUNTER (OUTPATIENT)
Facility: HOSPITAL | Age: 24
Setting detail: OUTPATIENT SURGERY
Discharge: HOME | End: 2024-11-11
Attending: OBSTETRICS & GYNECOLOGY | Admitting: OBSTETRICS & GYNECOLOGY
Payer: COMMERCIAL

## 2024-11-11 ENCOUNTER — ANESTHESIA (OUTPATIENT)
Dept: PERIOP | Facility: HOSPITAL | Age: 24
End: 2024-11-11
Payer: COMMERCIAL

## 2024-11-11 VITALS
WEIGHT: 155 LBS | OXYGEN SATURATION: 99 % | TEMPERATURE: 98.6 F | HEART RATE: 104 BPM | HEIGHT: 65 IN | BODY MASS INDEX: 25.83 KG/M2 | DIASTOLIC BLOOD PRESSURE: 72 MMHG | SYSTOLIC BLOOD PRESSURE: 130 MMHG | RESPIRATION RATE: 20 BRPM

## 2024-11-11 DIAGNOSIS — R87.613 HGSIL ON PAP SMEAR OF CERVIX: ICD-10-CM

## 2024-11-11 PROBLEM — Z98.890 S/P LEEP: Status: ACTIVE | Noted: 2024-11-11

## 2024-11-11 LAB
EXT PREGNANCY TEST URINE: NEGATIVE
EXT PREGNANCY TEST URINE: NEGATIVE
EXT. CONTROL: NORMAL
EXT. CONTROL: NORMAL

## 2024-11-11 PROCEDURE — 88305 TISSUE EXAM BY PATHOLOGIST: CPT | Performed by: PATHOLOGY

## 2024-11-11 PROCEDURE — 81025 URINE PREGNANCY TEST: CPT | Performed by: OBSTETRICS & GYNECOLOGY

## 2024-11-11 PROCEDURE — 88342 IMHCHEM/IMCYTCHM 1ST ANTB: CPT | Performed by: PATHOLOGY

## 2024-11-11 PROCEDURE — 57522 CONIZATION OF CERVIX: CPT | Performed by: OBSTETRICS & GYNECOLOGY

## 2024-11-11 PROCEDURE — 88307 TISSUE EXAM BY PATHOLOGIST: CPT | Performed by: PATHOLOGY

## 2024-11-11 RX ORDER — SODIUM CHLORIDE, SODIUM LACTATE, POTASSIUM CHLORIDE, CALCIUM CHLORIDE 600; 310; 30; 20 MG/100ML; MG/100ML; MG/100ML; MG/100ML
INJECTION, SOLUTION INTRAVENOUS CONTINUOUS PRN
Status: DISCONTINUED | OUTPATIENT
Start: 2024-11-11 | End: 2024-11-11

## 2024-11-11 RX ORDER — LIDOCAINE HYDROCHLORIDE 10 MG/ML
INJECTION, SOLUTION EPIDURAL; INFILTRATION; INTRACAUDAL; PERINEURAL AS NEEDED
Status: DISCONTINUED | OUTPATIENT
Start: 2024-11-11 | End: 2024-11-11

## 2024-11-11 RX ORDER — ONDANSETRON 2 MG/ML
4 INJECTION INTRAMUSCULAR; INTRAVENOUS ONCE AS NEEDED
Status: DISCONTINUED | OUTPATIENT
Start: 2024-11-11 | End: 2024-11-11 | Stop reason: HOSPADM

## 2024-11-11 RX ORDER — PROMETHAZINE HYDROCHLORIDE 25 MG/ML
6.25 INJECTION, SOLUTION INTRAMUSCULAR; INTRAVENOUS ONCE
Status: DISCONTINUED | OUTPATIENT
Start: 2024-11-11 | End: 2024-11-11 | Stop reason: HOSPADM

## 2024-11-11 RX ORDER — DEXAMETHASONE SODIUM PHOSPHATE 10 MG/ML
INJECTION, SOLUTION INTRAMUSCULAR; INTRAVENOUS AS NEEDED
Status: DISCONTINUED | OUTPATIENT
Start: 2024-11-11 | End: 2024-11-11

## 2024-11-11 RX ORDER — KETAMINE HYDROCHLORIDE 50 MG/ML
INJECTION, SOLUTION INTRAMUSCULAR; INTRAVENOUS AS NEEDED
Status: DISCONTINUED | OUTPATIENT
Start: 2024-11-11 | End: 2024-11-11

## 2024-11-11 RX ORDER — HYDROMORPHONE HCL/PF 1 MG/ML
0.5 SYRINGE (ML) INJECTION
Status: DISCONTINUED | OUTPATIENT
Start: 2024-11-11 | End: 2024-11-11 | Stop reason: HOSPADM

## 2024-11-11 RX ORDER — ACETAMINOPHEN 325 MG/1
975 TABLET ORAL EVERY 6 HOURS PRN
Status: DISCONTINUED | OUTPATIENT
Start: 2024-11-11 | End: 2024-11-11 | Stop reason: HOSPADM

## 2024-11-11 RX ORDER — FENTANYL CITRATE/PF 50 MCG/ML
25 SYRINGE (ML) INJECTION
Status: DISCONTINUED | OUTPATIENT
Start: 2024-11-11 | End: 2024-11-11 | Stop reason: HOSPADM

## 2024-11-11 RX ORDER — IBUPROFEN 600 MG/1
600 TABLET, FILM COATED ORAL EVERY 6 HOURS PRN
Status: DISCONTINUED | OUTPATIENT
Start: 2024-11-11 | End: 2024-11-11 | Stop reason: HOSPADM

## 2024-11-11 RX ORDER — SODIUM CHLORIDE, SODIUM LACTATE, POTASSIUM CHLORIDE, CALCIUM CHLORIDE 600; 310; 30; 20 MG/100ML; MG/100ML; MG/100ML; MG/100ML
75 INJECTION, SOLUTION INTRAVENOUS CONTINUOUS
Status: CANCELLED | OUTPATIENT
Start: 2024-11-11

## 2024-11-11 RX ORDER — PROPOFOL 10 MG/ML
INJECTION, EMULSION INTRAVENOUS CONTINUOUS PRN
Status: DISCONTINUED | OUTPATIENT
Start: 2024-11-11 | End: 2024-11-11

## 2024-11-11 RX ORDER — ACETAMINOPHEN 325 MG/1
975 TABLET ORAL ONCE
Status: COMPLETED | OUTPATIENT
Start: 2024-11-11 | End: 2024-11-11

## 2024-11-11 RX ORDER — LIDOCAINE HYDROCHLORIDE AND EPINEPHRINE 10; 10 MG/ML; UG/ML
INJECTION, SOLUTION INFILTRATION; PERINEURAL AS NEEDED
Status: DISCONTINUED | OUTPATIENT
Start: 2024-11-11 | End: 2024-11-11 | Stop reason: HOSPADM

## 2024-11-11 RX ORDER — PROPOFOL 10 MG/ML
INJECTION, EMULSION INTRAVENOUS AS NEEDED
Status: DISCONTINUED | OUTPATIENT
Start: 2024-11-11 | End: 2024-11-11

## 2024-11-11 RX ORDER — FENTANYL CITRATE 50 UG/ML
INJECTION, SOLUTION INTRAMUSCULAR; INTRAVENOUS AS NEEDED
Status: DISCONTINUED | OUTPATIENT
Start: 2024-11-11 | End: 2024-11-11

## 2024-11-11 RX ORDER — SODIUM CHLORIDE, SODIUM LACTATE, POTASSIUM CHLORIDE, CALCIUM CHLORIDE 600; 310; 30; 20 MG/100ML; MG/100ML; MG/100ML; MG/100ML
125 INJECTION, SOLUTION INTRAVENOUS CONTINUOUS
Status: DISCONTINUED | OUTPATIENT
Start: 2024-11-11 | End: 2024-11-11 | Stop reason: HOSPADM

## 2024-11-11 RX ORDER — ONDANSETRON 2 MG/ML
4 INJECTION INTRAMUSCULAR; INTRAVENOUS EVERY 6 HOURS PRN
Status: CANCELLED | OUTPATIENT
Start: 2024-11-11

## 2024-11-11 RX ORDER — ONDANSETRON 2 MG/ML
INJECTION INTRAMUSCULAR; INTRAVENOUS AS NEEDED
Status: DISCONTINUED | OUTPATIENT
Start: 2024-11-11 | End: 2024-11-11

## 2024-11-11 RX ORDER — MIDAZOLAM HYDROCHLORIDE 2 MG/2ML
INJECTION, SOLUTION INTRAMUSCULAR; INTRAVENOUS AS NEEDED
Status: DISCONTINUED | OUTPATIENT
Start: 2024-11-11 | End: 2024-11-11

## 2024-11-11 RX ORDER — KETOROLAC TROMETHAMINE 30 MG/ML
INJECTION, SOLUTION INTRAMUSCULAR; INTRAVENOUS AS NEEDED
Status: DISCONTINUED | OUTPATIENT
Start: 2024-11-11 | End: 2024-11-11

## 2024-11-11 RX ADMIN — DEXAMETHASONE SODIUM PHOSPHATE 10 MG: 10 INJECTION INTRAMUSCULAR; INTRAVENOUS at 15:09

## 2024-11-11 RX ADMIN — LIDOCAINE HYDROCHLORIDE 50 MG: 10 INJECTION, SOLUTION EPIDURAL; INFILTRATION; INTRACAUDAL; PERINEURAL at 15:09

## 2024-11-11 RX ADMIN — FENTANYL CITRATE 25 MCG: 50 INJECTION INTRAMUSCULAR; INTRAVENOUS at 15:31

## 2024-11-11 RX ADMIN — KETAMINE HYDROCHLORIDE 30 MG: 50 INJECTION INTRAMUSCULAR; INTRAVENOUS at 15:12

## 2024-11-11 RX ADMIN — ONDANSETRON 4 MG: 2 INJECTION INTRAMUSCULAR; INTRAVENOUS at 15:09

## 2024-11-11 RX ADMIN — PROPOFOL 120 MCG/KG/MIN: 10 INJECTION, EMULSION INTRAVENOUS at 15:09

## 2024-11-11 RX ADMIN — DEXMEDETOMIDINE 12 MCG: 100 INJECTION, SOLUTION INTRAVENOUS at 15:09

## 2024-11-11 RX ADMIN — ACETAMINOPHEN 975 MG: 325 TABLET, FILM COATED ORAL at 14:15

## 2024-11-11 RX ADMIN — KETOROLAC TROMETHAMINE 30 MG: 30 INJECTION, SOLUTION INTRAMUSCULAR; INTRAVENOUS at 15:28

## 2024-11-11 RX ADMIN — FENTANYL CITRATE 50 MCG: 50 INJECTION INTRAMUSCULAR; INTRAVENOUS at 15:09

## 2024-11-11 RX ADMIN — SODIUM CHLORIDE, SODIUM LACTATE, POTASSIUM CHLORIDE, AND CALCIUM CHLORIDE: .6; .31; .03; .02 INJECTION, SOLUTION INTRAVENOUS at 14:11

## 2024-11-11 RX ADMIN — MIDAZOLAM 2 MG: 1 INJECTION INTRAMUSCULAR; INTRAVENOUS at 15:07

## 2024-11-11 RX ADMIN — PROPOFOL 50 MG: 10 INJECTION, EMULSION INTRAVENOUS at 15:09

## 2024-11-11 RX ADMIN — DEXMEDETOMIDINE 4 MCG: 100 INJECTION, SOLUTION INTRAVENOUS at 15:19

## 2024-11-11 RX ADMIN — FENTANYL CITRATE 25 MCG: 50 INJECTION INTRAMUSCULAR; INTRAVENOUS at 15:23

## 2024-11-11 RX ADMIN — PROPOFOL 30 MG: 10 INJECTION, EMULSION INTRAVENOUS at 15:16

## 2024-11-11 NOTE — INTERVAL H&P NOTE
H&P reviewed. After examining the patient I find no changes in the patients condition since the H&P had been written.    Vitals:    11/11/24 1407   BP: 138/74   Pulse: (!) 122   Resp: 18   Temp: 98.5 °F (36.9 °C)   SpO2: 99%

## 2024-11-11 NOTE — ANESTHESIA PREPROCEDURE EVALUATION
Procedure:  EXAM UNDER ANESTHESIA; BIOPSY LEEP CERVIX (Cervix)    Relevant Problems   GI/HEPATIC   (+) Gastroesophageal reflux disease      NEURO/PSYCH   (+) Generalized anxiety disorder   (+) MDD (major depressive disorder), recurrent episode, mild (HCC)   (+) Severe episode of recurrent major depressive disorder, without psychotic features (HCC)      PULMONARY   (+) Shortness of breath        Physical Exam    Airway    Mallampati score: II  TM Distance: >3 FB  Neck ROM: full     Dental   No notable dental hx     Cardiovascular  Rhythm: regular, Rate: normal    Pulmonary   Breath sounds clear to auscultation    Other Findings  Intercisor Distance > 3cm    post-pubertal.      Anesthesia Plan  ASA Score- 2     Anesthesia Type- IV sedation with anesthesia with ASA Monitors.         Additional Monitors:     Airway Plan:     Comment: NPO appropriate. Discussed benefits/risks of monitored anesthetic care which involves providing a dynamic level of mild to deep sedation. Complications include awareness and/or airway obstruction/aspiration which may necessitate conversion to general anesthesia. All questions answered. Patient understands and wishes to proceed. .       Plan Factors-Exercise tolerance (METS): >4 METS.    Chart reviewed. EKG reviewed.  Existing labs reviewed.                   Induction-     Postoperative Plan- Plan for postoperative opioid use.         Informed Consent- Anesthetic plan and risks discussed with patient.  I personally reviewed this patient with the CRNA. Discussed and agreed on the Anesthesia Plan with the CRNA..

## 2024-11-11 NOTE — OP NOTE
OPERATIVE REPORT  PATIENT NAME: Grace Doherty    :  2000  MRN: [id number]  Pt Location: AN OR ROOM 03    SURGERY DATE: 2024    Surgeons and Role:     * Ariella Madsen MD - Primary     * Billie Neri MD - Assisting    Preop Diagnosis:  HGSIL on Pap smear of cervix [R87.613]    Post-Op Diagnosis Codes:     * HGSIL on Pap smear of cervix [R87.613]    Procedure(s):  EXAM UNDER ANESTHESIA; BIOPSY LEEP CERVIX    Specimen(s):  ID Type Source Tests Collected by Time Destination   1 : POST LEEP ENDOCERVICAL CURETTINGS Tissue Cervix, Endocervical TISSUE EXAM Ariella Madsen MD 2024 1528    2 : LEEP Tissue Soft Tissue, Other TISSUE EXAM Ariella Madsen MD 2024 1532        Estimated Blood Loss:   Minimal    Drains:  Straight Cath 10 mL     Anesthesia Type:   IV Sedation with Anesthesia    Operative Indications:  HGSIL on Pap smear of cervix [R87.613]    Operative Findings:  Grossly normal appearing external genitalia   Nulliparous cervix with decreased uptake of lugols solution and 12 and 6 o'clock   Hemostatic LEEP excision cite       Complications:   None Apparent     Procedure and Technique:  Brief History  Ms Grace Doherty is a 24 y.o. year old G0 with a Pap smear showing ASCUS cannot rule out HSIL and a colposcopy showing HSIL at 6 o'clock and LSIL at 12 o'clock .  Surgical risks: The patient was informed of all the risks and benefits of a loop electrosurgical excision procedure.  Risks included but were not limited to bleeding, infection, injury to the vulva, vagina, cervix, uterine perforation, or negative effects on future pregnancy outcomes.  The patient expressed understanding the risks involved, all portions were answered, the patient was consented to the procedure.    Description of Procedure    Patient was taken to the operating room were a time out was performed to confirm correct patient and correct procedure. Total Intravenous anesthesia (TIVA) was  administered and the patient was positioned on the OR table in the dorsal lithotomy position. All pressure points were padded and a cat hugger was placed to maintain control of core body temperature. The patient was prepped and draped in the usual sterile fashion.    Operative Technique    The bladder was emptied with a straight catheter and 10 milliliters of clear yellow urine were obtained.     A coated speculum was inserted into the vagina and used to visualize the cervix. Lidocaine with epinephrine was injected circumferentially on the face of the cervix and blanching was noted. The transformation zone was identified. Lugol's solution was applied to the cervix and a lesion was identified at 12 o'clock and 6 o'clock.     A loop electrode (25mm x 10mm) was selected and used to excise the lesion using 40/40 cut/cautery setting.   Endocervical curettage was completed using the small curet, placed on Telfa, and also sent to pathology.    The cervical bed was cauterized using a ball tip Bovie electrocautery, taking care to avoid the cervical canal. Good hemostasis was confirmed at the conclusion of the procedure.     Coated speculum was removed from vagina.     At the conclusion of the procedure, all needle, sponge, and instrument counts were noted to be correct x2. Patient tolerated the procedure well and was transferred to PACU in stable condition prior to discharge with follow up in 1-2 weeks.     Dr. Madsen was present and participated in all key portions of the case.      Patient Disposition:  PACU              SIGNATURE: Billie Neri MD  DATE: November 11, 2024  TIME: 3:54 PM

## 2024-11-11 NOTE — ANESTHESIA POSTPROCEDURE EVALUATION
Post-Op Assessment Note    CV Status:  Stable  Pain Score: 0    Pain management: adequate    Multimodal analgesia used between 6 hours prior to anesthesia start to PACU discharge    Mental Status:  Alert and awake   Hydration Status:  Stable   PONV Controlled:  None   Airway Patency:  Patent     Post Op Vitals Reviewed: Yes    No anethesia notable event occurred.    Staff: CRNA           Last Filed PACU Vitals:  Vitals Value Taken Time   Temp 98.4    Pulse 95    /64    Resp 16    SpO2 99        Modified Harsh:  No data recorded

## 2024-11-13 NOTE — ANESTHESIA POSTPROCEDURE EVALUATION
Post-Op Assessment Note    CV Status:  Stable    Pain management: adequate       Mental Status:  Awake   Hydration Status:  Euvolemic   PONV Controlled:  Controlled   Airway Patency:  Patent     Post Op Vitals Reviewed: Yes    No anethesia notable event occurred.    Staff: Anesthesiologist           Last Filed PACU Vitals:  Vitals Value Taken Time   Temp 98.6 °F (37 °C) 11/11/24 1615   Pulse 88 11/11/24 1627   /72 11/11/24 1615   Resp 19 11/11/24 1627   SpO2 99 % 11/11/24 1627   Vitals shown include unfiled device data.    Modified Harsh:  No data recorded

## 2024-11-19 DIAGNOSIS — F41.1 GAD (GENERALIZED ANXIETY DISORDER): ICD-10-CM

## 2024-11-19 DIAGNOSIS — G47.00 INSOMNIA, UNSPECIFIED TYPE: ICD-10-CM

## 2024-11-19 RX ORDER — TRAZODONE HYDROCHLORIDE 100 MG/1
100 TABLET ORAL
Qty: 90 TABLET | Refills: 1 | Status: SHIPPED | OUTPATIENT
Start: 2024-11-19

## 2024-11-19 NOTE — TELEPHONE ENCOUNTER
Reason for call:   [x] Refill   [] Prior Auth  [] Other:     Office:   [] PCP/Provider -   [x] Specialty/Provider - [facility]     Medication:  traZODone (DESYREL) 100 mg tablet    Dose/Frequency: Take 1 tablet (100 mg total) by mouth daily at bedtime     Quantity: 90 tablet     Pharmacy: [facility]/pharmacy #[id number] - [place] - [address] [place] [address]. [phone]    Does the patient have enough for 3 days?   [] Yes   [x] No - Send as HP to POD

## 2024-11-20 ENCOUNTER — NURSE TRIAGE (OUTPATIENT)
Dept: OTHER | Facility: OTHER | Age: 24
End: 2024-11-20

## 2024-11-20 NOTE — TELEPHONE ENCOUNTER
"Reason for Disposition  • [1] Caller has URGENT question AND [2] triager unable to answer question    Answer Assessment - Initial Assessment Questions  1. SYMPTOM: \"What's the main symptom you're concerned about?\" (e.g., pain, fever, vomiting)      Woke up and noticed he passed 2 blood clots, golf ball sized    2. ONSET: \"When did this start?\"      A few minutes ago    3. SURGERY: \"What surgery did you have?\"     LEEP procedure    4. DATE of SURGERY: \"When was the surgery?\"       11/11/2024    5. ANESTHESIA: \"What type of anesthesia did you have?\" (e.g., general, spinal, epidural, local)        6. DRAINS: \"Were any drains place in or around the wound?\" (e.g., Hemovac, Wilber-Patel, Penrose)      N/A    7. PAIN: \"Is there any pain?\" If Yes, ask: \"How bad is it?\"  (Scale 1-10; or mild, moderate, severe)      Mild to moderate vaginal cramping and lower abdominal pain    8. FEVER: \"Do you have a fever?\" If Yes, ask: \"What is your temperature, how was it measured, and when did it start?\"      Feels warm and sweaty, temp 98 oral    9. VOMITING: \"Is there any vomiting?\" If Yes, ask: \"How many times?\"      Denies     10. BLEEDING: \"Is there any bleeding?\" If Yes, ask: \"How much?\" and \"Where?\"      Patient reports vaginal bleeding started lightly 2 days ago and started getting heavier last night.  Tonight even heavier, using 1 pad every 1.5-2 hours.    11. OTHER SYMPTOMS: \"Do you have any other symptoms?\" (e.g., drainage from wound, painful urination, constipation)      Lightheadedness but denies dizziness    Patient is not on any blood thinners.  Patient is due for her menstrual cycle next week.    Protocols used: Post-Op Symptoms and Questions-Adult-AH    "

## 2024-11-20 NOTE — TELEPHONE ENCOUNTER
"Regarding: excessive bleeding  ----- Message from Diana WARD sent at 11/20/2024  5:27 AM EST -----  \"I had a leak procedure about a week ago. I just passed two blood clots. I am breeding pretty heavy.\"    "

## 2024-11-20 NOTE — TELEPHONE ENCOUNTER
Contacted on-call provider who advised patient hydrate and take ibuprofen which can help decrease bleeding if this is her menstrual cycle coming early.  If she is soaking a pad an hour for more than 2 hours or continues to pass blood clots, patient should be evaluated.    Contacted patient to make her aware of on-call provider's recommendations.  She verbalized understanding and was agreeable.  She is going to hydrate and take Motrin and try to go back to sleep.  She will proceed to ER if she continues to pass clots before the office opens.  If she is still having problems after the office opens, she will contact the office.

## 2024-11-20 NOTE — TELEPHONE ENCOUNTER
"Reason for Disposition   [1] Caller has URGENT question AND [2] triager unable to answer question    Answer Assessment - Initial Assessment Questions  1. SYMPTOM: \"What's the main symptom you're concerned about?\" (e.g., pain, fever, vomiting)      Woke up and noticed he passed 2 blood clots, golf ball sized    2. ONSET: \"When did this start?\"      A few minutes ago    3. SURGERY: \"What surgery did you have?\"     LEEP procedure    4. DATE of SURGERY: \"When was the surgery?\"       11/11/2024    5. ANESTHESIA: \"What type of anesthesia did you have?\" (e.g., general, spinal, epidural, local)      IV sedation    6. DRAINS: \"Were any drains place in or around the wound?\" (e.g., Hemovac, Wilber-Patel, Penrose)      N/A    7. PAIN: \"Is there any pain?\" If Yes, ask: \"How bad is it?\"  (Scale 1-10; or mild, moderate, severe)      Mild to moderate vaginal cramping and lower abdominal pain    8. FEVER: \"Do you have a fever?\" If Yes, ask: \"What is your temperature, how was it measured, and when did it start?\"      Feels warm and sweaty, temp 98 oral    9. VOMITING: \"Is there any vomiting?\" If Yes, ask: \"How many times?\"      Denies     10. BLEEDING: \"Is there any bleeding?\" If Yes, ask: \"How much?\" and \"Where?\"      Patient reports vaginal bleeding started lightly 2 days ago and started getting heavier last night.  Tonight even heavier, using 1 pad every 1.5-2 hours.    11. OTHER SYMPTOMS: \"Do you have any other symptoms?\" (e.g., drainage from wound, painful urination, constipation)      Lightheadedness but denies dizziness    Patient is not on any blood thinners.  Patient is due for her menstrual cycle next week.    Protocols used: Post-Op Symptoms and Questions-Adult-AH    "

## 2024-11-20 NOTE — TELEPHONE ENCOUNTER
Patient requested refill of :  Requested Prescriptions     Pending Prescriptions Disp Refills    traZODone (DESYREL) 100 mg tablet 90 tablet 1     Sig: Take 1 tablet (100 mg total) by mouth daily at bedtime         Refill request approved and sent to pharmacy on file per patient request.     Shad Agrawal MD

## 2024-11-21 ENCOUNTER — APPOINTMENT (EMERGENCY)
Dept: CT IMAGING | Facility: HOSPITAL | Age: 24
End: 2024-11-21
Payer: COMMERCIAL

## 2024-11-21 ENCOUNTER — HOSPITAL ENCOUNTER (EMERGENCY)
Facility: HOSPITAL | Age: 24
Discharge: HOME | End: 2024-11-21
Attending: EMERGENCY MEDICINE
Payer: COMMERCIAL

## 2024-11-21 VITALS
RESPIRATION RATE: 16 BRPM | SYSTOLIC BLOOD PRESSURE: 124 MMHG | DIASTOLIC BLOOD PRESSURE: 67 MMHG | TEMPERATURE: 97.9 F | HEART RATE: 84 BPM | BODY MASS INDEX: 25.83 KG/M2 | WEIGHT: 155 LBS | HEIGHT: 65 IN | OXYGEN SATURATION: 98 %

## 2024-11-21 DIAGNOSIS — R07.81 PLEURITIC CHEST PAIN: Primary | ICD-10-CM

## 2024-11-21 LAB
ALBUMIN SERPL BCG-MCNC: 4.3 G/DL (ref 3.5–5)
ALP SERPL-CCNC: 68 U/L (ref 34–104)
ALT SERPL W P-5'-P-CCNC: 10 U/L (ref 7–52)
ANION GAP SERPL CALCULATED.3IONS-SCNC: 8 MMOL/L (ref 4–13)
AST SERPL W P-5'-P-CCNC: 14 U/L (ref 13–39)
BASOPHILS # BLD AUTO: 0.04 THOUSANDS/ÂΜL (ref 0–0.1)
BASOPHILS NFR BLD AUTO: 0 % (ref 0–1)
BILIRUB SERPL-MCNC: 0.23 MG/DL (ref 0.2–1)
BNP SERPL-MCNC: 15 PG/ML (ref 0–100)
BUN SERPL-MCNC: 8 MG/DL (ref 5–25)
CALCIUM SERPL-MCNC: 9.3 MG/DL (ref 8.4–10.2)
CARDIAC TROPONIN I PNL SERPL HS: <2 NG/L (ref ?–50)
CHLORIDE SERPL-SCNC: 103 MMOL/L (ref 96–108)
CO2 SERPL-SCNC: 25 MMOL/L (ref 21–32)
CREAT SERPL-MCNC: 0.78 MG/DL (ref 0.6–1.3)
EOSINOPHIL # BLD AUTO: 0.07 THOUSAND/ÂΜL (ref 0–0.61)
EOSINOPHIL NFR BLD AUTO: 1 % (ref 0–6)
ERYTHROCYTE [DISTWIDTH] IN BLOOD BY AUTOMATED COUNT: 12.9 % (ref 11.6–15.1)
GFR SERPL CREATININE-BSD FRML MDRD: 105 ML/MIN/1.73SQ M
GLUCOSE SERPL-MCNC: 84 MG/DL (ref 65–140)
HCT VFR BLD AUTO: 40.7 % (ref 34.8–46.1)
HGB BLD-MCNC: 13.2 G/DL (ref 11.5–15.4)
IMM GRANULOCYTES # BLD AUTO: 0.07 THOUSAND/UL (ref 0–0.2)
IMM GRANULOCYTES NFR BLD AUTO: 1 % (ref 0–2)
LYMPHOCYTES # BLD AUTO: 2.86 THOUSANDS/ÂΜL (ref 0.6–4.47)
LYMPHOCYTES NFR BLD AUTO: 24 % (ref 14–44)
MCH RBC QN AUTO: 29 PG (ref 26.8–34.3)
MCHC RBC AUTO-ENTMCNC: 32.4 G/DL (ref 31.4–37.4)
MCV RBC AUTO: 90 FL (ref 82–98)
MONOCYTES # BLD AUTO: 0.59 THOUSAND/ÂΜL (ref 0.17–1.22)
MONOCYTES NFR BLD AUTO: 5 % (ref 4–12)
NEUTROPHILS # BLD AUTO: 8.36 THOUSANDS/ÂΜL (ref 1.85–7.62)
NEUTS SEG NFR BLD AUTO: 69 % (ref 43–75)
NRBC BLD AUTO-RTO: 0 /100 WBCS
PLATELET # BLD AUTO: 292 THOUSANDS/UL (ref 149–390)
PMV BLD AUTO: 9.3 FL (ref 8.9–12.7)
POTASSIUM SERPL-SCNC: 3.5 MMOL/L (ref 3.5–5.3)
PROT SERPL-MCNC: 7.5 G/DL (ref 6.4–8.4)
RBC # BLD AUTO: 4.55 MILLION/UL (ref 3.81–5.12)
SODIUM SERPL-SCNC: 136 MMOL/L (ref 135–147)
WBC # BLD AUTO: 11.99 THOUSAND/UL (ref 4.31–10.16)

## 2024-11-21 PROCEDURE — 85025 COMPLETE CBC W/AUTO DIFF WBC: CPT | Performed by: EMERGENCY MEDICINE

## 2024-11-21 PROCEDURE — 99285 EMERGENCY DEPT VISIT HI MDM: CPT

## 2024-11-21 PROCEDURE — 80053 COMPREHEN METABOLIC PANEL: CPT | Performed by: EMERGENCY MEDICINE

## 2024-11-21 PROCEDURE — 83880 ASSAY OF NATRIURETIC PEPTIDE: CPT

## 2024-11-21 PROCEDURE — 84484 ASSAY OF TROPONIN QUANT: CPT | Performed by: EMERGENCY MEDICINE

## 2024-11-21 PROCEDURE — 93005 ELECTROCARDIOGRAM TRACING: CPT

## 2024-11-21 PROCEDURE — 36415 COLL VENOUS BLD VENIPUNCTURE: CPT

## 2024-11-21 PROCEDURE — 96360 HYDRATION IV INFUSION INIT: CPT

## 2024-11-21 PROCEDURE — 71275 CT ANGIOGRAPHY CHEST: CPT

## 2024-11-21 RX ORDER — ACETAMINOPHEN 325 MG/1
650 TABLET ORAL ONCE
Status: COMPLETED | OUTPATIENT
Start: 2024-11-21 | End: 2024-11-21

## 2024-11-21 RX ADMIN — SODIUM CHLORIDE 500 ML: 0.9 INJECTION, SOLUTION INTRAVENOUS at 10:15

## 2024-11-21 RX ADMIN — IOHEXOL 65 ML: 350 INJECTION, SOLUTION INTRAVENOUS at 10:40

## 2024-11-21 RX ADMIN — ACETAMINOPHEN 650 MG: 325 TABLET, FILM COATED ORAL at 10:04

## 2024-11-21 NOTE — TELEPHONE ENCOUNTER
"Incoming call from patient. Called in last night due to heavy vaginal bleeding. Bleeding has decreased but is still present. Patient is now having chest pain that started around 2am. Chest feels tight \"feels like I am wearing a corset\". This sensation comes and goes. Patient has also been feeling lightheaded and fatigued. Denies SOB at this time.     RN advised evaluation in ED due to chest pain/lightheaded. Patient will proceed to [facility] for evaluation.   "

## 2024-11-21 NOTE — ED PROVIDER NOTES
"Time reflects when diagnosis was documented in both MDM as applicable and the Disposition within this note       Time User Action Codes Description Comment    11/21/2024 11:26 AM Marlo Griffith Jose Miguel [R07.81] Pleuritic chest pain           ED Disposition       ED Disposition   Discharge    Condition   Stable    Date/Time   Thu Nov 21, 2024 11:26 AM    Comment   Grace Doherty discharge to home/self care.                   Assessment & Plan       Medical Decision Making  24-year-old female presents today with concerns of 2 episodes of chest pain today, 1 at 3:30 in the morning 1 at 6:30 in the morning.  No chest pain now.  Midly pleuritic.  Patient is high risk for pulmonary embolism, CTA.  CT did not show any pulmonary emboli, or any other acute intrathoracic abnormality.  Patient feeling well at the time of discharge.  Will patient follow-up with her OB and family doctor for further evaluation and treatment.  ------------------------------------------------------------  Strict return precautions discussed. Patient at time of discharge well-appearing in no acute distress, all questions answered. Patient agreeable to plan.  Patient's vitals, lab/imaging results, diagnosis, and treatment plan were discussed with the patient. All new/changed medications were discussed with patient, specifically, route of administration, how often and when to take, and where they can be picked up. Strict return precautions as well as close follow up with PCP was discussed with the patient and the patient was agreeable to my recommendations.  Patient verbally acknowledged understanding of the above communications. All labs reviewed and utilized in the medical decision making process (if labs were ordered). Portions of the record may have been created with voice recognition software.  Occasional wrong word or \"sound a like\" substitutions may have occurred due to the inherent limitations of voice recognition software.  Read the chart " carefully and recognize, using context, where substitutions have occurred.      Amount and/or Complexity of Data Reviewed  Labs: ordered. Decision-making details documented in ED Course.  Radiology: ordered.    Risk  OTC drugs.  Prescription drug management.        ED Course as of 11/21/24 1452   Thu Nov 21, 2024   0953 WBC(!): 11.99   0953 Platelet Count: 292   0953 Hemoglobin: 13.2   0953 Hematocrit: 40.7   0953 Absolute Neutrophils(!): 8.36   0953 hs TnI 0hr: <2       Medications   acetaminophen (TYLENOL) tablet 650 mg (650 mg Oral Given 11/21/24 1004)   sodium chloride 0.9 % bolus 500 mL (0 mL Intravenous Stopped 11/21/24 1135)   iohexol (OMNIPAQUE) 350 MG/ML injection (MULTI-DOSE) 65 mL (65 mL Intravenous Given 11/21/24 1040)       ED Risk Strat Scores                               Wells' Criteria for PE      Flowsheet Row Most Recent Value   Wells' Criteria for PE    Clinical signs and symptoms of DVT 0 Filed at: 11/21/2024 0953   PE is primary diagnosis or equally likely 3 Filed at: 11/21/2024 0953   HR >100 1.5 Filed at: 11/21/2024 0953   Immobilization at least 3 days or Surgery in the previous 4 weeks 1.5 Filed at: 11/21/2024 0953   Previous, objectively diagnosed PE or DVT 0 Filed at: 11/21/2024 0953   Hemoptysis 0 Filed at: 11/21/2024 0953   Malignancy with treatment within 6 months or palliative 0 Filed at: 11/21/2024 0953   Wells' Criteria Total 6 Filed at: 11/21/2024 0953                        History of Present Illness       Chief Complaint   Patient presents with    Chest Pain     Pt reports having leep procedure done 11/11, since then bleeding onset 3 days ago, bright red blood but has slowed down, states was told she would spot but reports heavier, chest pain onset 0230, reports fatigue       Past Medical History:   Diagnosis Date    Anemia     Anxiety     Asthma     hx of as child - outgrew     Depression     Gastritis     Gastroparesis     GERD (gastroesophageal reflux disease)     Lyme disease      Migraine       Past Surgical History:   Procedure Laterality Date    ESOPHAGOGASTRODUODENOSCOPY      EGD with bipsy    MD CONIZATION CERVIX W/WO D&C RPR ELTRD EXC N/A 11/11/2024    Procedure: EXAM UNDER ANESTHESIA; BIOPSY LEEP CERVIX;  Surgeon: Ariella Madsen MD;  Location: [facility];  Service: Gynecology    UPPER GASTROINTESTINAL ENDOSCOPY        Family History   Problem Relation Age of Onset    Anxiety disorder Mother     Miscarriages / Stillbirths Mother     Bipolar disorder Father     Sleep disorder Father     Anxiety disorder Maternal Grandmother     Hyperlipidemia Maternal Grandmother     Hypertension Maternal Grandmother     Thyroid disease Maternal Grandmother     Ovarian cancer Maternal Grandmother     Miscarriages / Stillbirths Maternal Grandmother     Lung cancer Maternal Grandfather     Kidney disease Paternal Grandmother     Prostate cancer Paternal Grandfather     Skin cancer Paternal Grandfather     Breast cancer Maternal Aunt     Breast cancer Other     Substance Abuse Neg Hx       Social History     Tobacco Use    Smoking status: Never    Smokeless tobacco: Never   Vaping Use    Vaping status: Never Used   Substance Use Topics    Alcohol use: Yes     Alcohol/week: 2.0 standard drinks of alcohol     Types: 2 Standard drinks or equivalent per week     Comment: Socially    Drug use: Yes     Frequency: 7.0 times per week     Types: Marijuana     Comment: Medical Marijuana      E-Cigarette/Vaping    E-Cigarette Use Never User       E-Cigarette/Vaping Substances    Nicotine No     THC Yes medical    CBD No     Flavoring No     Other No     Unknown No       I have reviewed and agree with the history as documented.     24-year-old female presenting today with chest pain that started this morning, 2 occurrences of that since 2:30 AM.  It did not wake her up from sleep, she was awake when she noticed it.  Episodes last about 15 minutes, they gradually increase in pain and then dissipate.  She  "had 2 episodes this morning, last at 6:30 AM.  She denies any cough but does have a \"tickling feeling in her throat\".  She is approximately 10 days status post LEEP procedure.  States that she has some spotting and passed a clot a couple days ago, which she was informed would be normal.  Denies bleeding through more than 1 pad an hour.  Denies any chance of pregnancy.  No nausea or vomiting.  Some fatigue however no lightheadedness.  No abdominal pain or shortness of breath.        Review of Systems   Constitutional:  Positive for fatigue. Negative for chills and fever.   HENT:  Negative for ear pain and sore throat.    Eyes:  Negative for pain and visual disturbance.   Respiratory:  Positive for chest tightness. Negative for apnea, cough, choking, shortness of breath, wheezing and stridor.    Cardiovascular:  Positive for chest pain. Negative for palpitations and leg swelling.   Gastrointestinal:  Negative for abdominal pain, constipation, nausea and vomiting.   Genitourinary:  Negative for dysuria and hematuria.   Musculoskeletal:  Negative for arthralgias and back pain.   Skin:  Negative for color change and rash.   Neurological:  Negative for seizures and syncope.   All other systems reviewed and are negative.          Objective       ED Triage Vitals   Temperature Pulse Blood Pressure Respirations SpO2 Patient Position - Orthostatic VS   11/21/24 0836 11/21/24 0836 11/21/24 0836 11/21/24 0836 11/21/24 0836 11/21/24 0836   97.9 °F (36.6 °C) 98 151/75 18 99 % Sitting      Temp Source Heart Rate Source BP Location FiO2 (%) Pain Score    11/21/24 0836 11/21/24 0836 11/21/24 0836 -- 11/21/24 0933    Oral Monitor Right arm  No Pain      Vitals      Date and Time Temp Pulse SpO2 Resp BP Pain Score FACES Pain Rating User   11/21/24 1130 -- 84 98 % 16 124/67 No Pain -- LD   11/21/24 1004 -- -- -- -- -- 3 -- RLN   11/21/24 0933 -- 87 99 % 18 126/74 No Pain -- LH   11/21/24 0836 97.9 °F (36.6 °C) 98 99 % 18 151/75 -- -- " KK            Physical Exam  Vitals and nursing note reviewed.   Constitutional:       General: She is not in acute distress.     Appearance: She is well-developed. She is not ill-appearing.   HENT:      Head: Normocephalic and atraumatic.   Eyes:      Conjunctiva/sclera: Conjunctivae normal.   Cardiovascular:      Rate and Rhythm: Regular rhythm. Tachycardia present.      Pulses:           Carotid pulses are 2+ on the right side and 2+ on the left side.       Radial pulses are 2+ on the right side and 2+ on the left side.        Dorsalis pedis pulses are 2+ on the right side and 2+ on the left side.        Posterior tibial pulses are 2+ on the right side and 2+ on the left side.      Heart sounds: Normal heart sounds. No murmur heard.  Pulmonary:      Effort: Pulmonary effort is normal. No respiratory distress.      Breath sounds: Normal breath sounds.   Abdominal:      Palpations: Abdomen is soft.      Tenderness: There is no abdominal tenderness.   Musculoskeletal:         General: No swelling.      Cervical back: Neck supple.   Skin:     General: Skin is warm and dry.      Capillary Refill: Capillary refill takes less than 2 seconds.   Neurological:      Mental Status: She is alert.   Psychiatric:         Mood and Affect: Mood normal.         Results Reviewed       Procedure Component Value Units Date/Time    B-Type Natriuretic Peptide(BNP) [[id number]]  (Normal) Collected: 11/21/24 0840    Lab Status: Final result Specimen: Blood from Arm, Left Updated: 11/21/24 1032     BNP 15 pg/mL     HS Troponin 0hr (reflex protocol) [[id number]]  (Normal) Collected: 11/21/24 0840    Lab Status: Final result Specimen: Blood from Arm, Left Updated: 11/21/24 0924     hs TnI 0hr <2 ng/L     Comprehensive metabolic panel [[id number]] Collected: 11/21/24 0840    Lab Status: Final result Specimen: Blood from Arm, Left Updated: 11/21/24 0920     Sodium 136 mmol/L      Potassium 3.5 mmol/L      Chloride 103 mmol/L      CO2 25 mmol/L       ANION GAP 8 mmol/L      BUN 8 mg/dL      Creatinine 0.78 mg/dL      Glucose 84 mg/dL      Calcium 9.3 mg/dL      AST 14 U/L      ALT 10 U/L      Alkaline Phosphatase 68 U/L      Total Protein 7.5 g/dL      Albumin 4.3 g/dL      Total Bilirubin 0.23 mg/dL      eGFR 105 ml/min/1.73sq m     Narrative:      National Kidney Disease Foundation guidelines for Chronic Kidney Disease (CKD):     Stage 1 with normal or high GFR (GFR > 90 mL/min/1.73 square meters)    Stage 2 Mild CKD (GFR = 60-89 mL/min/1.73 square meters)    Stage 3A Moderate CKD (GFR = 45-59 mL/min/1.73 square meters)    Stage 3B Moderate CKD (GFR = 30-44 mL/min/1.73 square meters)    Stage 4 Severe CKD (GFR = 15-29 mL/min/1.73 square meters)    Stage 5 End Stage CKD (GFR <15 mL/min/1.73 square meters)  Note: GFR calculation is accurate only with a steady state creatinine    CBC and differential [[id number]]  (Abnormal) Collected: 11/21/24 0840    Lab Status: Final result Specimen: Blood from Arm, Left Updated: 11/21/24 0902     WBC 11.99 Thousand/uL      RBC 4.55 Million/uL      Hemoglobin 13.2 g/dL      Hematocrit 40.7 %      MCV 90 fL      MCH 29.0 pg      MCHC 32.4 g/dL      RDW 12.9 %      MPV 9.3 fL      Platelets 292 Thousands/uL      nRBC 0 /100 WBCs      Segmented % 69 %      Immature Grans % 1 %      Lymphocytes % 24 %      Monocytes % 5 %      Eosinophils Relative 1 %      Basophils Relative 0 %      Absolute Neutrophils 8.36 Thousands/µL      Absolute Immature Grans 0.07 Thousand/uL      Absolute Lymphocytes 2.86 Thousands/µL      Absolute Monocytes 0.59 Thousand/µL      Eosinophils Absolute 0.07 Thousand/µL      Basophils Absolute 0.04 Thousands/µL     Ramer draw [[id number]] Collected: 11/21/24 0840    Lab Status: In process Specimen: Blood from Arm, Left Updated: 11/21/24 0853    Narrative:      The following orders were created for panel order Ramer draw.  Procedure                               Abnormality         Status                      ---------                               -----------         ------                     Lavender Top 7ml on hold[[id number]]                         In process                   Please view results for these tests on the individual orders.            CTA chest pe study   Final Interpretation by Salvador Dubois MD (11/21 1107)      No evidence for pulmonary embolism.                  Workstation performed: [id number]             Procedures    ED Medication and Procedure Management   Prior to Admission Medications   Prescriptions Last Dose Informant Patient Reported? Taking?   ARIPiprazole (ABILIFY) 5 mg tablet   No No   Sig: Take 1 tablet (5 mg total) by mouth daily   DULoxetine (CYMBALTA) 30 mg delayed release capsule   No No   Sig: TAKE 1 CAPSULE BY MOUTH EVERY DAY IN THE MORNING WITH 60MG CAPSULE FOR TOTAL DOSE OF 90MG   DULoxetine (CYMBALTA) 60 mg delayed release capsule   No No   Sig: Take 90 mg (one 30 mg tablet and one 60 mg tablet) every morning for a total of 90 mg daily   LORazepam (ATIVAN) 1 mg tablet   No No   Sig: Take 1 tablet (1 mg total) by mouth daily as needed for anxiety   levonorgestrel-ethinyl estradiol (Marlissa) 0.15-30 MG-MCG per tablet   No No   Sig: Take 1 tablet by mouth daily   traZODone (DESYREL) 100 mg tablet   No No   Sig: Take 1 tablet (100 mg total) by mouth daily at bedtime      Facility-Administered Medications: None     Discharge Medication List as of 11/21/2024 11:26 AM        CONTINUE these medications which have NOT CHANGED    Details   ARIPiprazole (ABILIFY) 5 mg tablet Take 1 tablet (5 mg total) by mouth daily, Starting Fri 10/18/2024, Until u 1/16/2025, Normal      !! DULoxetine (CYMBALTA) 30 mg delayed release capsule TAKE 1 CAPSULE BY MOUTH EVERY DAY IN THE MORNING WITH 60MG CAPSULE FOR TOTAL DOSE OF 90MG, Normal      !! DULoxetine (CYMBALTA) 60 mg delayed release capsule Take 90 mg (one 30 mg tablet and one 60 mg tablet) every morning for a total of 90 mg daily, Fill  Later      levonorgestrel-ethinyl estradiol (Marlissa) 0.15-30 MG-MCG per tablet Take 1 tablet by mouth daily, Starting Mon 10/28/2024, Normal      LORazepam (ATIVAN) 1 mg tablet Take 1 tablet (1 mg total) by mouth daily as needed for anxiety, Starting Wed 10/9/2024, Fill Later      traZODone (DESYREL) 100 mg tablet Take 1 tablet (100 mg total) by mouth daily at bedtime, Starting Tue 11/19/2024, Normal       !! - Potential duplicate medications found. Please discuss with provider.        No discharge procedures on file.  ED SEPSIS DOCUMENTATION   Time reflects when diagnosis was documented in both MDM as applicable and the Disposition within this note       Time User Action Codes Description Comment    11/21/2024 11:26 AM Marlo Griffith Add [R07.81] Pleuritic chest pain                  Marlo Griffith PA-C  11/21/24 4696

## 2024-11-22 LAB
APOB+LDLR+PCSK9 GENE MUT ANL BLD/T: NOT DETECTED
ATRIAL RATE: 129 BPM
BRCA1+BRCA2 DEL+DUP + FULL MUT ANL BLD/T: NOT DETECTED
HOLD SPECIMEN: NORMAL
MLH1+MSH2+MSH6+PMS2 GN DEL+DUP+FUL M: NOT DETECTED
QRS AXIS: 83 DEGREES
QRSD INTERVAL: 74 MS
QT INTERVAL: 370 MS
QTC INTERVAL: 552 MS
T WAVE AXIS: 50 DEGREES
VENTRICULAR RATE: 134 BPM

## 2024-11-22 PROCEDURE — 93010 ELECTROCARDIOGRAM REPORT: CPT | Performed by: INTERNAL MEDICINE

## 2024-11-25 ENCOUNTER — OFFICE VISIT (OUTPATIENT)
Dept: OBGYN CLINIC | Facility: CLINIC | Age: 24
End: 2024-11-25

## 2024-11-25 ENCOUNTER — TELEPHONE (OUTPATIENT)
Age: 24
End: 2024-11-25

## 2024-11-25 VITALS
BODY MASS INDEX: 26.66 KG/M2 | SYSTOLIC BLOOD PRESSURE: 120 MMHG | DIASTOLIC BLOOD PRESSURE: 78 MMHG | WEIGHT: 160 LBS | HEIGHT: 65 IN

## 2024-11-25 DIAGNOSIS — Z48.89 POSTOPERATIVE VISIT: Primary | ICD-10-CM

## 2024-11-25 PROCEDURE — 99024 POSTOP FOLLOW-UP VISIT: CPT | Performed by: OBSTETRICS & GYNECOLOGY

## 2024-11-25 NOTE — TELEPHONE ENCOUNTER
Pt called in stating she has her post op appointment scheduled for today but believes her period started. States she has been having bleeding for the last week but it got heavier today. She is due to start her period today. Advised she should still come in for the post op visit. Pt verbalized understanding and is thankful.

## 2024-11-25 NOTE — PROGRESS NOTES
"Subjective     Grace Doherty is a 24 y.o. female who presents to the clinic 2 weeks status post  LEEP  for  CIN3 . Eating a regular diet without difficulty. Bowel movements are normal. The patient is not having any pain.  Feeling tired but OK.  Currently has period    The following portions of the patient's history were reviewed and updated as appropriate: allergies, current medications, past family history, past medical history, past social history, past surgical history, and problem list.    Review of Systems  Pertinent items are noted in HPI.      Objective     /78 (BP Location: Left arm, Patient Position: Sitting, Cuff Size: Standard)   Ht 5' 5\" (1.651 m)   Wt 72.6 kg (160 lb)   LMP 10/27/2024   BMI 26.63 kg/m²   General:  alert and oriented, in no acute distress                 Assessment      Doing well postoperatively.  Operative findings again reviewed. Pathology - pending     Plan     1. Continue any current medications.  2. Wound care discussed.  3. Activity restrictions: none  4. Anticipated return to work: now.  5. Will call with pathology results to plan f/u papsl.   "

## 2024-11-26 ENCOUNTER — RESULTS FOLLOW-UP (OUTPATIENT)
Dept: OBGYN CLINIC | Facility: CLINIC | Age: 24
End: 2024-11-26

## 2024-11-26 PROCEDURE — 88342 IMHCHEM/IMCYTCHM 1ST ANTB: CPT | Performed by: PATHOLOGY

## 2024-11-26 PROCEDURE — 88305 TISSUE EXAM BY PATHOLOGIST: CPT | Performed by: PATHOLOGY

## 2024-11-26 PROCEDURE — 88307 TISSUE EXAM BY PATHOLOGIST: CPT | Performed by: PATHOLOGY

## 2024-11-26 NOTE — RESULT ENCOUNTER NOTE
Kin Edwards,  Your pathology came  back!.  It shows low grade changes with negative margins.  This means we will repeat you pap and HPV test in 1 year and your annual.  Let me know if you if you have any questions,  DrG

## 2024-12-20 DIAGNOSIS — G47.00 INSOMNIA, UNSPECIFIED TYPE: ICD-10-CM

## 2024-12-20 DIAGNOSIS — F33.1 MDD (MAJOR DEPRESSIVE DISORDER), RECURRENT EPISODE, MODERATE (HCC): ICD-10-CM

## 2024-12-20 DIAGNOSIS — F41.1 GAD (GENERALIZED ANXIETY DISORDER): ICD-10-CM

## 2024-12-20 RX ORDER — DULOXETIN HYDROCHLORIDE 30 MG/1
CAPSULE, DELAYED RELEASE ORAL
Qty: 90 CAPSULE | Refills: 1 | Status: SHIPPED | OUTPATIENT
Start: 2024-12-20

## 2024-12-20 RX ORDER — DULOXETIN HYDROCHLORIDE 60 MG/1
CAPSULE, DELAYED RELEASE ORAL
Qty: 90 CAPSULE | Refills: 1 | Status: SHIPPED | OUTPATIENT
Start: 2024-12-20

## 2024-12-20 NOTE — TELEPHONE ENCOUNTER
Patient requested refill of :  Requested Prescriptions     Pending Prescriptions Disp Refills    DULoxetine (CYMBALTA) 30 mg delayed release capsule 90 capsule 1     Sig: TAKE 1 CAPSULE BY MOUTH EVERY DAY IN THE MORNING WITH 60MG CAPSULE FOR TOTAL DOSE OF 90MG    DULoxetine (CYMBALTA) 60 mg delayed release capsule 90 capsule 1     Sig: Take 90 mg (one 30 mg tablet and one 60 mg tablet) every morning for a total of 90 mg daily         Refill request approved and sent to pharmacy on file per patient request.     Shad Agrawal MD

## 2024-12-20 NOTE — TELEPHONE ENCOUNTER
"Reason for call:   [x] Refill   [] Prior Auth  [] Other: pt hasn't had the 60 mg capsules so she's been taking more of the 30 mg capsules and is now out early so pharmacy needs a new script with an \"ok to fill early\" since it's too soon to fill     Office:   [] PCP/Provider -   [x] Specialty/Provider - psychiatry     Medication: duloxetine 30 mg take one daily    Duloxetine 60 mg take one daily       Quantity: 90 for both    Pharmacy: [facility]    Does the patient have enough for 3 days?   [] Yes   [x] No - Send as HP to POD    "

## 2025-01-07 ENCOUNTER — OFFICE VISIT (OUTPATIENT)
Dept: FAMILY MEDICINE CLINIC | Facility: CLINIC | Age: 25
End: 2025-01-07
Payer: COMMERCIAL

## 2025-01-07 VITALS
HEIGHT: 65 IN | HEART RATE: 111 BPM | OXYGEN SATURATION: 98 % | SYSTOLIC BLOOD PRESSURE: 138 MMHG | RESPIRATION RATE: 16 BRPM | WEIGHT: 162 LBS | DIASTOLIC BLOOD PRESSURE: 84 MMHG | BODY MASS INDEX: 26.99 KG/M2

## 2025-01-07 DIAGNOSIS — R00.2 PALPITATION: ICD-10-CM

## 2025-01-07 DIAGNOSIS — R00.0 TACHYCARDIA: Primary | ICD-10-CM

## 2025-01-07 DIAGNOSIS — F31.81 BIPOLAR 2 DISORDER (HCC): ICD-10-CM

## 2025-01-07 DIAGNOSIS — F33.1 MDD (MAJOR DEPRESSIVE DISORDER), RECURRENT EPISODE, MODERATE (HCC): ICD-10-CM

## 2025-01-07 PROCEDURE — 99214 OFFICE O/P EST MOD 30 MIN: CPT | Performed by: FAMILY MEDICINE

## 2025-01-07 NOTE — PROGRESS NOTES
Name: Grace Doherty      : 2000      MRN: [id number]  Encounter Provider: Karla Szymanski MD  Encounter Date: 2025   Encounter department: [facility]    Assessment & Plan  Tachycardia  She has persistent tachycardia for last few months, and she get more palpitation when she go upstairs or exertion and difficult for her to do the exercise, advised to see the cardiologist  Orders:    Ambulatory Referral to Cardiology; Future    Palpitation  Heart racing on mild exertion and changing positions advised to see cardiologist, had recent ER visit, advised to stop the caffeine intake  Orders:    Ambulatory Referral to Cardiology; Future  Vies to have follow-up again and  for physical  Bipolar 2 disorder (HCC)         MDD (major depressive disorder), recurrent episode, mild (HCC)  Depression Screening Follow-up Plan: Patient's depression screening was positive with a PHQ-9 score of 17. Continue regular follow-up with their psychologist/therapist/psychiatrist who is managing their mental health condition(s).  Stable           Form is filled for 's license  History of Present Illness     She needs a 's form to be filled, she has been seeing psychiatrist for bipolar and taking all her medication, she says for last few months she has heart racing palpitations on mild exertion like going up stairs she feels heart pounding, she was seen recently in ER for mild chest discomfort,  No family history of heart disease, takes 1-1/2 cup coffee daily        Review of Systems   Constitutional: Negative.    HENT: Negative.     Eyes: Negative.    Respiratory: Negative.     Cardiovascular:  Positive for palpitations.   Gastrointestinal: Negative.    Musculoskeletal: Negative.    Psychiatric/Behavioral: Negative.       Past Medical History:   Diagnosis Date    Anemia     Anxiety     Asthma     hx of as child - outgrew     Depression     Gastritis     Gastroparesis     GERD (gastroesophageal reflux  disease)     Lyme disease     Migraine      Past Surgical History:   Procedure Laterality Date    ESOPHAGOGASTRODUODENOSCOPY      EGD with bipsy    OR CONIZATION CERVIX W/WO D&C RPR ELTRD EXC N/A 11/11/2024    Procedure: EXAM UNDER ANESTHESIA; BIOPSY LEEP CERVIX;  Surgeon: Ariella Madsen MD;  [facility];  Service: Gynecology    UPPER GASTROINTESTINAL ENDOSCOPY       Family History   Problem Relation Age of Onset    Anxiety disorder Mother     Miscarriages / Stillbirths Mother     Bipolar disorder Father     Sleep disorder Father     Anxiety disorder Maternal Grandmother     Hyperlipidemia Maternal Grandmother     Hypertension Maternal Grandmother     Thyroid disease Maternal Grandmother     Ovarian cancer Maternal Grandmother     Miscarriages / Stillbirths Maternal Grandmother     Lung cancer Maternal Grandfather     Kidney disease Paternal Grandmother     Prostate cancer Paternal Grandfather     Skin cancer Paternal Grandfather     Breast cancer Maternal Aunt     Breast cancer Other     Substance Abuse Neg Hx      Social History     Tobacco Use    Smoking status: Never    Smokeless tobacco: Never   Vaping Use    Vaping status: Never Used   Substance and Sexual Activity    Alcohol use: Yes     Alcohol/week: 2.0 standard drinks of alcohol     Types: 2 Standard drinks or equivalent per week     Comment: Socially    Drug use: Yes     Frequency: 7.0 times per week     Types: Marijuana     Comment: Medical Marijuana    Sexual activity: Yes     Partners: Male     Birth control/protection: OCP     Current Outpatient Medications on File Prior to Visit   Medication Sig    ARIPiprazole (ABILIFY) 5 mg tablet Take 1 tablet (5 mg total) by mouth daily    DULoxetine (CYMBALTA) 30 mg delayed release capsule TAKE 1 CAPSULE BY MOUTH EVERY DAY IN THE MORNING WITH 60MG CAPSULE FOR TOTAL DOSE OF 90MG    DULoxetine (CYMBALTA) 60 mg delayed release capsule Take 90 mg (one 30 mg tablet and one 60 mg tablet) every  "morning for a total of 90 mg daily    levonorgestrel-ethinyl estradiol (Marlissa) 0.15-30 MG-MCG per tablet Take 1 tablet by mouth daily    LORazepam (ATIVAN) 1 mg tablet Take 1 tablet (1 mg total) by mouth daily as needed for anxiety    traZODone (DESYREL) 100 mg tablet Take 1 tablet (100 mg total) by mouth daily at bedtime     Allergies   Allergen Reactions    Pineapple - Food Allergy Anaphylaxis    Penicillins Hives and Vomiting     Immunization History   Administered Date(s) Administered    COVID-19 PFIZER VACCINE 0.3 ML IM 12/06/2021    COVID-19 Pfizer Vac BIVALENT Gabriele-sucrose 12 Yr+ IM 12/10/2022    DTP 2000, 2000, 01/18/2000, 03/12/2001, 08/25/2004    HPV9 12/11/2018, 06/18/2020, 06/29/2021    Hep B, adult 2000, 2000, 12/01/2008    Hib (PRP-OMP) 1999, 1999, 12/01/2008    INFLUENZA 01/10/2021, 12/10/2022    IPV 2000, 2000, 03/12/2001, 08/24/2004    MMR 1999, 11/06/2000, 08/25/2004, 12/01/2008    Meningococcal, Unknown Serogroups 04/10/2012, 10/13/2015    Tdap 04/10/2012, 10/13/2015    Tuberculin Skin Test-PPD Intradermal 10/13/2015    Varicella 08/25/2004, 03/10/2011     Objective   /84   Pulse (!) 111   Resp 16   Ht 5' 5\" (1.651 m)   Wt 73.5 kg (162 lb)   SpO2 98%   BMI 26.96 kg/m²     Physical Exam  Vitals and nursing note reviewed.   Constitutional:       Appearance: Normal appearance.   Cardiovascular:      Rate and Rhythm: Tachycardia present.      Heart sounds: No murmur heard.  Pulmonary:      Effort: Pulmonary effort is normal.   Abdominal:      Palpations: Abdomen is soft.   Musculoskeletal:      Cervical back: Normal range of motion.   Skin:     General: Skin is warm.   Neurological:      General: No focal deficit present.   Psychiatric:         Mood and Affect: Mood normal.         "

## 2025-01-07 NOTE — ASSESSMENT & PLAN NOTE
Heart racing on mild exertion and changing positions advised to see cardiologist, had recent ER visit, advised to stop the caffeine intake  Orders:    Ambulatory Referral to Cardiology; Future  Vies to have follow-up again and  for physical

## 2025-01-07 NOTE — ASSESSMENT & PLAN NOTE
Depression Screening Follow-up Plan: Patient's depression screening was positive with a PHQ-9 score of 17. Continue regular follow-up with their psychologist/therapist/psychiatrist who is managing their mental health condition(s).  Stable

## 2025-01-07 NOTE — ASSESSMENT & PLAN NOTE
She has persistent tachycardia for last few months, and she get more palpitation when she go upstairs or exertion and difficult for her to do the exercise, advised to see the cardiologist  Orders:    Ambulatory Referral to Cardiology; Future

## 2025-01-08 ENCOUNTER — TELEPHONE (OUTPATIENT)
Age: 25
End: 2025-01-08

## 2025-01-08 NOTE — TELEPHONE ENCOUNTER
Patient is calling regarding cancelling an appointment.    Date/Time: 1/8/25 at 9 am.    Reason: Pt overslept    Patient was rescheduled: YES [x] NO []  If yes, when was Patient reschedule for: 2/11/25 at 9:30 am.    Patient requesting call back to reschedule: YES [] NO [x]

## 2025-01-13 DIAGNOSIS — F41.1 GAD (GENERALIZED ANXIETY DISORDER): ICD-10-CM

## 2025-01-13 DIAGNOSIS — G47.00 INSOMNIA, UNSPECIFIED TYPE: ICD-10-CM

## 2025-01-13 DIAGNOSIS — F33.1 MDD (MAJOR DEPRESSIVE DISORDER), RECURRENT EPISODE, MODERATE (HCC): ICD-10-CM

## 2025-01-15 RX ORDER — ARIPIPRAZOLE 5 MG/1
5 TABLET ORAL DAILY
Qty: 90 TABLET | Refills: 0 | Status: SHIPPED | OUTPATIENT
Start: 2025-01-15 | End: 2025-04-15

## 2025-01-22 DIAGNOSIS — Z30.41 ENCOUNTER FOR SURVEILLANCE OF CONTRACEPTIVE PILLS: ICD-10-CM

## 2025-01-22 RX ORDER — LEVONORGESTREL AND ETHINYL ESTRADIOL 0.15-0.03
1 KIT ORAL DAILY
Qty: 84 TABLET | Refills: 0 | Status: SHIPPED | OUTPATIENT
Start: 2025-01-22

## 2025-01-22 NOTE — TELEPHONE ENCOUNTER
Patient needs an appointment. Please contact the patient to schedule an appointment. Last office visit: 2/5/24

## 2025-02-12 ENCOUNTER — TELEPHONE (OUTPATIENT)
Dept: CARDIOLOGY CLINIC | Facility: CLINIC | Age: 25
End: 2025-02-12

## 2025-02-18 ENCOUNTER — TELEPHONE (OUTPATIENT)
Age: 25
End: 2025-02-18

## 2025-03-04 DIAGNOSIS — F41.1 GAD (GENERALIZED ANXIETY DISORDER): ICD-10-CM

## 2025-03-04 NOTE — TELEPHONE ENCOUNTER
Reason for call:   [x] Refill   [] Prior Auth  [] Other:     Office:   [] PCP/Provider -   [x] Specialty/Provider - [facility] / Shad Agrawal MD     Medication:   ~ LORazepam (ATIVAN) 1 mg tablet - Take 1 tablet (1 mg total) by mouth daily as needed for anxiety     Pharmacy:   [facility]/pharmacy #[id number] - [place] - [address] [place] [address].     Does the patient have enough for 3 days?   [] Yes   [x] No - Send as HP to POD

## 2025-03-05 ENCOUNTER — OFFICE VISIT (OUTPATIENT)
Dept: CARDIOLOGY CLINIC | Facility: CLINIC | Age: 25
End: 2025-03-05
Payer: COMMERCIAL

## 2025-03-05 VITALS
HEART RATE: 93 BPM | DIASTOLIC BLOOD PRESSURE: 80 MMHG | SYSTOLIC BLOOD PRESSURE: 110 MMHG | OXYGEN SATURATION: 99 % | WEIGHT: 164 LBS | BODY MASS INDEX: 27.29 KG/M2

## 2025-03-05 DIAGNOSIS — R00.0 TACHYCARDIA: ICD-10-CM

## 2025-03-05 DIAGNOSIS — R55 VASOVAGAL NEAR-SYNCOPE: ICD-10-CM

## 2025-03-05 DIAGNOSIS — R00.2 PALPITATION: Primary | ICD-10-CM

## 2025-03-05 DIAGNOSIS — R07.89 CHEST DISCOMFORT: ICD-10-CM

## 2025-03-05 PROCEDURE — 93000 ELECTROCARDIOGRAM COMPLETE: CPT | Performed by: INTERNAL MEDICINE

## 2025-03-05 PROCEDURE — 99204 OFFICE O/P NEW MOD 45 MIN: CPT | Performed by: INTERNAL MEDICINE

## 2025-03-05 NOTE — PROGRESS NOTES
"[facility] [place]  [address] [facility] [address]  [address]  [facility] [address]  [phone]                                              Cardiology Office Consult  Grace Doherty, 24 y.o. female  YOB: 2000  MRN: [id number] Encounter: [id number]      PCP - Karla Szymanski MD  Referring Provider - Karla Szymanski MD    Chief Complaint   Patient presents with   • New Patient Visit     Refer by Dr. Szymanski    • Chest Pain     Sometimes in her chest in the middle and her back        Assessment  Palpitations  Tachycardia  Chest discomfort  Vasovagal near syncope    Plan  Assessment & Plan  Palpitation  Overview  Mainly has intermittent heart racing sensation, at times when she is active  No high risk features of arrhythmia/dizziness/near syncope associated with palpitation  Impression  Possibly related to sinus tachycardia from anxiety/stress  Plan  Check 1 week ZIO XT monitor   counseled regarding working on reducing stress/anxiety  Continue yoga  Add cardio-exercises with walking/light jogging/treadmill/elliptical for 30 minutes 5 days a week  Limit caffeine/alcohol use  I discussed with her that medical therapy for the same is likely to lower blood pressure as well, which in turn will likely increase likelihood of dizziness/near syncope, limiting use  Tachycardia  She reports concerns of postural tachycardia  I counseled her extensively regarding   the physiology behind postural tachycardia,   the fact that at her age her heart rate \"go up to as high as 195 and be perfectly normal  Some of her current medications have propensity to cause both orthostatic hypotension and tachycardia (aripirazole, duloxetine), and that she should discuss these with her prescribing physician  Will additionally check on a Zio patch monitor to assess for tachyarrhythmia  Vasovagal near-syncope  Single episode, related to strong odor of bleach at the hair salon  Counseled regarding physiology behind the same and need to " primarily work on reducing exposure to triggering stimuli  Monitor and follow-up on Zio patch monitor  Chest discomfort  Symptoms mainly related to tachycardia/mild deconditioning  Increase card exercises and monitor for exertional symptoms  Follow-up on Zio patch monitor      Results for orders placed or performed in visit on 25   POCT ECG    Impression    Normal sinus rhythm       Orders Placed This Encounter   Procedures   • Zio Monitor   • POCT ECG       Return in about 2 months (around 2025), or if symptoms worsen or fail to improve.      History of Present Illness   24 y.o. female, who is a graduating microbiology student, comes in as a new patient for consultation regarding concerns about palpitations/tachycardia/POTS    She has had longstanding issues with anxiety/depression and has been on multiple medical therapies for the same over the years.  Ativan 2019 --> + Abilify (~) --> + Trazodone --> + Cymbalta (~)    Recently, she reports having increased symptoms of palpitations/heart racing, associated with some chest tightness.  She went to the ED in November with chest discomfort and vaginal bleeding.  24 - ED visit with chest pain, vaginal bleeding. Chest tightness on waking up, making it difficult to breath - progressed through the day --> did not improve. Ruled out for ACS --> discharged with cardiology follow up.   She still continues to have intermittent episodes of heart racing, at times associated with some chest discomfort    She then reports an episode of dizziness, which she nearly passed out recently while at the hair salon.  1/15/25 - hair salon, bleach smell, near syncope while being seated.  No subsequent major sequelae.    Major shortness of breath or any major limitations to activity.    Family history  Paternal both grandparents - CABGx3  Maternal grandfather -  of lung cancer  Maternal grandmother - HTN  Father - alive at 55, no known CAD  Mother - alive at 47,  no known CAD or heart problems    Historical Information   Past Medical History:   Diagnosis Date   • Anemia    • Anxiety    • Asthma     hx of as child - outgrew    • Depression    • Gastritis    • Gastroparesis    • GERD (gastroesophageal reflux disease)    • Lyme disease    • Migraine      Past Surgical History:   Procedure Laterality Date   • ESOPHAGOGASTRODUODENOSCOPY      EGD with bipsy   • OK CONIZATION CERVIX W/WO D&C RPR ELTRD EXC N/A 11/11/2024    Procedure: EXAM UNDER ANESTHESIA; BIOPSY LEEP CERVIX;  Surgeon: Ariella Madsen MD;  Location[facility];  Service: Gynecology   • UPPER GASTROINTESTINAL ENDOSCOPY       Family History   Problem Relation Age of Onset   • Anxiety disorder Mother    • Miscarriages / Stillbirths Mother    • Bipolar disorder Father    • Sleep disorder Father    • Anxiety disorder Maternal Grandmother    • Hyperlipidemia Maternal Grandmother    • Hypertension Maternal Grandmother    • Thyroid disease Maternal Grandmother    • Ovarian cancer Maternal Grandmother    • Miscarriages / Stillbirths Maternal Grandmother    • Lung cancer Maternal Grandfather    • Kidney disease Paternal Grandmother    • Prostate cancer Paternal Grandfather    • Skin cancer Paternal Grandfather    • Breast cancer Maternal Aunt    • Breast cancer Other    • Substance Abuse Neg Hx      Current Outpatient Medications   Medication Instructions   • Altavera 0.15-30 MG-MCG per tablet 1 tablet, Oral, Daily   • ARIPiprazole (ABILIFY) 5 mg, Oral, Daily   • DULoxetine (CYMBALTA) 30 mg delayed release capsule TAKE 1 CAPSULE BY MOUTH EVERY DAY IN THE MORNING WITH 60MG CAPSULE FOR TOTAL DOSE OF 90MG   • DULoxetine (CYMBALTA) 60 mg delayed release capsule Take 90 mg (one 30 mg tablet and one 60 mg tablet) every morning for a total of 90 mg daily   • LORazepam (ATIVAN) 1 mg, Oral, Daily PRN   • traZODone (DESYREL) 100 mg, Oral, Daily at bedtime,         Allergies   Allergen Reactions   • Pineapple - Food Allergy  Anaphylaxis   • Penicillins Hives and Vomiting     Social History     Socioeconomic History   • Marital status: Single     Spouse name: None   • Number of children: None   • Years of education: None   • Highest education level: None   Occupational History   • None   Tobacco Use   • Smoking status: Never   • Smokeless tobacco: Never   Vaping Use   • Vaping status: Never Used   Substance and Sexual Activity   • Alcohol use: Yes     Alcohol/week: 2.0 standard drinks of alcohol     Types: 2 Standard drinks or equivalent per week     Comment: Socially   • Drug use: Yes     Frequency: 7.0 times per week     Types: Marijuana     Comment: Medical Marijuana   • Sexual activity: Yes     Partners: Male     Birth control/protection: OCP   Other Topics Concern   • None   Social History Narrative    Caffeine use    Currently in college    Poor dental hygiene    Tea      Social Drivers of Health     Financial Resource Strain: Not on file   Food Insecurity: Not on file   Transportation Needs: Not on file   Physical Activity: Not on file   Stress: Not on file   Social Connections: Not on file   Intimate Partner Violence: Not on file   Housing Stability: Not on file        Review of Systems   All other systems reviewed and are negative.        Vitals:  Vitals:    03/05/25 0827   BP: 110/80   BP Location: Left arm   Patient Position: Sitting   Cuff Size: Large   Pulse: 93   SpO2: 99%   Weight: 74.4 kg (164 lb)     BMI - Body mass index is 27.29 kg/m².  Wt Readings from Last 7 Encounters:   03/05/25 74.4 kg (164 lb)   01/07/25 73.5 kg (162 lb)   11/25/24 72.6 kg (160 lb)   11/21/24 70.3 kg (155 lb)   11/11/24 70.3 kg (155 lb)   10/28/24 73.3 kg (161 lb 9.6 oz)   09/27/24 71.3 kg (157 lb 3.2 oz)       Physical Exam  Vitals and nursing note reviewed.   Constitutional:       General: She is not in acute distress.     Appearance: Normal appearance. She is well-developed. She is not ill-appearing.   HENT:      Head: Normocephalic and  "atraumatic.      Nose: No congestion.   Eyes:      General: No scleral icterus.     Conjunctiva/sclera: Conjunctivae normal.   Neck:      Vascular: No carotid bruit or JVD.   Cardiovascular:      Rate and Rhythm: Normal rate and regular rhythm.      Pulses: Normal pulses.      Heart sounds: Normal heart sounds. No murmur heard.     No friction rub. No gallop.   Pulmonary:      Effort: Pulmonary effort is normal. No respiratory distress.      Breath sounds: Normal breath sounds. No rales.   Abdominal:      General: There is no distension.      Palpations: Abdomen is soft.      Tenderness: There is no abdominal tenderness.   Musculoskeletal:         General: No swelling or tenderness.      Cervical back: Neck supple.      Right lower leg: No edema.      Left lower leg: No edema.   Skin:     General: Skin is warm.   Neurological:      General: No focal deficit present.      Mental Status: She is alert and oriented to person, place, and time. Mental status is at baseline.   Psychiatric:         Mood and Affect: Mood normal.         Behavior: Behavior normal.         Thought Content: Thought content normal.           Labs:  CBC:   Lab Results   Component Value Date    WBC 11.99 (H) 11/21/2024    RBC 4.55 11/21/2024    HGB 13.2 11/21/2024    HCT 40.7 11/21/2024    MCV 90 11/21/2024     11/21/2024    RDW 12.9 11/21/2024       CMP:   Lab Results   Component Value Date     01/04/2016    K 3.5 11/21/2024     11/21/2024    CO2 25 11/21/2024    ANIONGAP 9 01/04/2016    BUN 8 11/21/2024    CREATININE 0.78 11/21/2024    EGFR 105 11/21/2024    GLUCOSE 75 01/04/2016    CALCIUM 9.3 11/21/2024    AST 14 11/21/2024    ALT 10 11/21/2024    ALKPHOS 68 11/21/2024    PROT 8.3 (H) 01/04/2016    BILITOT 0.23 01/04/2016       Magnesium:  No results found for: \"MG\"    Lipid Profile:   Lab Results   Component Value Date    HDL 80 11/08/2024    TRIG 110 11/08/2024    LDLCALC 82 11/08/2024       Thyroid Studies:   Lab Results " "  Component Value Date    HDR2OKZBYZNX 2.689 11/08/2024    FREET4 0.60 (L) 11/08/2024       A1c:  No components found for: \"HGA1C\"    INR:  No results found for: \"INR\"5    Cardiac testing:   No results found for this or any previous visit.    No results found for this or any previous visit.    No results found for this or any previous visit.    No results found for this or any previous visit.      CTA chest pe study  Narrative: CTA - CHEST WITH IV CONTRAST - PULMONARY ANGIOGRAM    INDICATION: High risk wells. CHEST PAIN / TACHYCARDIC.    COMPARISON: None.    TECHNIQUE: CTA examination of the chest was performed using angiographic technique according to a protocol specifically tailored to evaluate for pulmonary embolism. Multiplanar 2D reformatted images were created from the source data. In addition, coronal   3D MIP postprocessing was performed on the acquisition scanner.    Radiation dose length product (DLP) for this visit: 302 mGy-cm . This examination, like all CT scans performed in the [facility], was performed utilizing techniques to minimize radiation dose exposure, including the use of iterative   reconstruction and automated exposure control.    IV Contrast: 65 mL of iohexol (OMNIPAQUE)    FINDINGS:    PULMONARY ARTERIAL TREE:  No pulmonary embolus.    LUNGS: No focal airspace consolidation. There is no tracheal or endobronchial lesion.    PLEURA: Unremarkable.    HEART/GREAT VESSELS: Heart is unremarkable for patient's age. No thoracic aortic aneurysm.    MEDIASTINUM AND SERA: Unremarkable.    CHEST WALL AND LOWER NECK: Unremarkable.    VISUALIZED STRUCTURES IN THE UPPER ABDOMEN: Unremarkable.    OSSEOUS STRUCTURES: No acute fracture or destructive osseous lesion.  Impression: No evidence for pulmonary embolism.    Workstation performed: [id number]         "

## 2025-03-05 NOTE — ASSESSMENT & PLAN NOTE
"She reports concerns of postural tachycardia  I counseled her extensively regarding   the physiology behind postural tachycardia,   the fact that at her age her heart rate \"go up to as high as 195 and be perfectly normal  Some of her current medications have propensity to cause both orthostatic hypotension and tachycardia (aripirazole, duloxetine), and that she should discuss these with her prescribing physician  Will additionally check on a Zio patch monitor to assess for tachyarrhythmia  "

## 2025-03-05 NOTE — ASSESSMENT & PLAN NOTE
Overview  Mainly has intermittent heart racing sensation, at times when she is active  No high risk features of arrhythmia/dizziness/near syncope associated with palpitation  Impression  Possibly related to sinus tachycardia from anxiety/stress  Plan  Check 1 week OBIO SILVANO monitor   counseled regarding working on reducing stress/anxiety  Continue yoga  Add cardio-exercises with walking/light jogging/treadmill/elliptical for 30 minutes 5 days a week  Limit caffeine/alcohol use  I discussed with her that medical therapy for the same is likely to lower blood pressure as well, which in turn will likely increase likelihood of dizziness/near syncope, limiting use

## 2025-03-06 RX ORDER — LORAZEPAM 1 MG/1
1 TABLET ORAL DAILY PRN
Qty: 30 TABLET | Refills: 0 | Status: SHIPPED | OUTPATIENT
Start: 2025-03-06

## 2025-03-06 NOTE — TELEPHONE ENCOUNTER
Patient requested refill for:  Requested Prescriptions     Pending Prescriptions Disp Refills    LORazepam (ATIVAN) 1 mg tablet 30 tablet 2     Sig: Take 1 tablet (1 mg total) by mouth daily as needed for anxiety       PDMP reviewed on 03/06/25. Grace has been appropriately adherent to their controlled psychotropic medication regimen in the absence of apparent abuse or misuse. Therefore, this writer will send a 30-day refill to their pharmacy of choice including recommendation for patient to adhere to their outpatient appointment(s) with this writer to assure appropriate continuity of care.      Shad Agrawal MD

## 2025-04-01 ENCOUNTER — RESULTS FOLLOW-UP (OUTPATIENT)
Dept: CARDIOLOGY CLINIC | Facility: CLINIC | Age: 25
End: 2025-04-01

## 2025-04-07 ENCOUNTER — TELEPHONE (OUTPATIENT)
Age: 25
End: 2025-04-07

## 2025-04-07 NOTE — TELEPHONE ENCOUNTER
Pt called to get her appt for 4/8/25 at 9:30 am switched to a virtual visit due to feeling sick.  Writer switched appt.

## 2025-04-08 ENCOUNTER — TELEMEDICINE (OUTPATIENT)
Dept: PSYCHIATRY | Facility: CLINIC | Age: 25
End: 2025-04-08
Payer: COMMERCIAL

## 2025-04-08 DIAGNOSIS — G47.00 INSOMNIA, UNSPECIFIED TYPE: ICD-10-CM

## 2025-04-08 DIAGNOSIS — F41.1 GAD (GENERALIZED ANXIETY DISORDER): ICD-10-CM

## 2025-04-08 DIAGNOSIS — F33.1 MDD (MAJOR DEPRESSIVE DISORDER), RECURRENT EPISODE, MODERATE (HCC): ICD-10-CM

## 2025-04-08 DIAGNOSIS — Z87.898 HISTORY OF PALPITATIONS: ICD-10-CM

## 2025-04-08 DIAGNOSIS — F41.0 PANIC ATTACKS: ICD-10-CM

## 2025-04-08 DIAGNOSIS — F12.10 CANNABIS ABUSE, DAILY USE: ICD-10-CM

## 2025-04-08 DIAGNOSIS — F33.1 MDD (MAJOR DEPRESSIVE DISORDER), RECURRENT EPISODE, MODERATE (HCC): Primary | ICD-10-CM

## 2025-04-08 PROCEDURE — 99214 OFFICE O/P EST MOD 30 MIN: CPT | Performed by: PSYCHIATRY & NEUROLOGY

## 2025-04-08 PROCEDURE — 90833 PSYTX W PT W E/M 30 MIN: CPT | Performed by: PSYCHIATRY & NEUROLOGY

## 2025-04-08 RX ORDER — DULOXETIN HYDROCHLORIDE 30 MG/1
CAPSULE, DELAYED RELEASE ORAL
Qty: 14 CAPSULE | Refills: 0 | Status: SHIPPED | OUTPATIENT
Start: 2025-04-08 | End: 2025-04-08

## 2025-04-08 RX ORDER — ESCITALOPRAM OXALATE 5 MG/1
TABLET ORAL
Qty: 90 TABLET | Refills: 1 | Status: SHIPPED | OUTPATIENT
Start: 2025-04-08

## 2025-04-08 RX ORDER — DULOXETIN HYDROCHLORIDE 30 MG/1
CAPSULE, DELAYED RELEASE ORAL
Qty: 14 CAPSULE | Refills: 0 | Status: SHIPPED | OUTPATIENT
Start: 2025-04-08

## 2025-04-08 RX ORDER — LORAZEPAM 1 MG/1
1 TABLET ORAL DAILY PRN
Qty: 30 TABLET | Refills: 0 | Status: SHIPPED | OUTPATIENT
Start: 2025-04-08

## 2025-04-08 RX ORDER — DULOXETIN HYDROCHLORIDE 60 MG/1
CAPSULE, DELAYED RELEASE ORAL
Qty: 14 CAPSULE | Refills: 0 | Status: SHIPPED | OUTPATIENT
Start: 2025-04-08

## 2025-04-08 RX ORDER — ARIPIPRAZOLE 5 MG/1
5 TABLET ORAL DAILY
Qty: 90 TABLET | Refills: 1 | Status: SHIPPED | OUTPATIENT
Start: 2025-04-08 | End: 2025-07-07

## 2025-04-08 RX ORDER — DULOXETIN HYDROCHLORIDE 60 MG/1
CAPSULE, DELAYED RELEASE ORAL
Qty: 14 CAPSULE | Refills: 0 | OUTPATIENT
Start: 2025-04-08

## 2025-04-08 RX ORDER — TRAZODONE HYDROCHLORIDE 50 MG/1
50 TABLET ORAL
Qty: 90 TABLET | Refills: 1 | Status: SHIPPED | OUTPATIENT
Start: 2025-04-08

## 2025-04-08 NOTE — PATIENT INSTRUCTIONS
"Cymbalta - Take 60 mg (one 60 mg tablet) every morning for 2 weeks then take 30 mg (one 30 mg tablet) for 2 weeks then stop the medication  Lexapro - Take 1 tablet (one 5 mg tablet) when you are on the lowest dose of cymbalta then when you stop cymbalta increase lexapro to 10 mg (2 of the 5 mg tablets) daily       Please call the office nursing staff for medication issues including refills, problems getting medications, bothersome side effects, etc., at [phone].     Please return for a follow up appointment as discussed and arrive approximately 15 minutes prior to your appointment time. If you are running late or are unable to attend your appointment, please call our [place] office at [phone] (fax: [phone]), or if you were seen in the [facility] office, please call [phone] (fax [phone]).    National Suicide Prevention Hotline: 1-142.243.5097 or call 277.    To improve sleep:  - Keep a consistent sleep schedule. Get up at the same time every day, even on weekends or during vacations.  - Set a bedtime that is early enough for you to get at least 7-8 hours of sleep.  - Don’t go to bed unless you are sleepy.  - If you don’t fall asleep after 20 minutes, get out of bed and take a brief \"break\". Go to a quiet activity without a lot of light exposure. It is especially important to not get on electronics. During this \"break,\" you might consider sitting in a chair and reading a boring book or listening to soft music, until your eyelids start to feel heavy.  Then, would go back to sleep and try again.    - Establish a relaxing bedtime routine.  - Make your bedroom quiet and relaxing. Keep the room at a comfortable, cool temperature.  - Limit exposure to bright light in the evenings.  - Turn off electronic devices at least 30 minutes before bedtime.  - Avoid watching stressful or suspenseful TV programs right before bedtime.  - Don’t eat a large meal before bedtime. If you are hungry at " "night, eat a light, healthy snack.  - Exercise regularly and maintain a healthy diet.  Participate in regular physical activities like exercise, although avoid approximately 3-4 hours prior to bed.  Morning exercise is ideal.  - Avoid consuming caffeine in the afternoon or evening.  - Avoid consuming alcohol before bedtime.  - Reduce your fluid intake before bedtime.  - Avoid daytime napping.  - Consider reading \"No More Sleepless nights\" by John Cook, Ph.D.  - Consider use of online resources including:  - http://[employer]/cbt-online-insomnia-treatment.html  - http://[employer]  - C[employer]  Mag[employer] on your Smart Phone.  - Go! To Sleep by the [facility].    Look up \"grounding techniques\" and/or \"anchoring demonstration\" online and try a few to see what may work for you. Practice these skills before you need them, when you are not feeling too anxious or triggered. You can also search for free guided meditation videos online to help improve your head space when you are feeling very anxious or triggered.      Healthy Diet   The American Heart Association and the American College of Cardiology have long recommended a healthy diet for not only patients who are at risk for atherosclerotic cardiovascular disease (ASCVD) but also the general public. In keeping with this evidence-based recommendation, the \"2018 Guideline on the Management of Blood Cholesterol\" stresses that a healthy diet should include adequate intake of these essentials:   Vegetables, fruits, and whole grains   Legumes and nuts   Low-fat dairy products   Low-fat poultry (without the skin)   Fish and seafood   Nontropical vegetable oils     The recent guidelines do provide room for cultural food preferences in a healthy diet, but in general, all patients should limit their intake of saturated and avoid all trans fats, sweets, sugar-sweetened beverages, and red meats.  Please maintain adequate hydration of at least 2 Liters of water per day, " and improve nutrition by decreasing portion sizes, avoiding fried foods, fast foods, inappropriate snacking and overly processed and packaged items with 'added sugars' (whether in drinks or foods), and observing nutritional facts information on any items that are packaged to reduce intake of saturated fats and AVOID trans fats as mentioned above. Again, consume whole foods such as vegetables, higher lean protein intake, and using healthier lifestyle plans such as the Mediterranean diet with healthier fats such as those from seeds, nuts, and using organic extra virgin olive oil or avocado oil in lieu of processed vegetable oils or margarine.    Physical Activity   Recommended DAILY exercise for at least 150 minutes of moderate exercise weekly!  In addition to a healthy diet, all patients should include regular physical activity in their weekly routines, at moderate to vigorous intensity. Any activity is better than nothing, so if your patients can't meet the recommendation of vigorous activity, moderate-intensity activity can still help them reduce their risk of ASCVD.     Below are the American Heart Association's recommendations for physical activity per week (preferably spread throughout the week):     For Overall Cardiovascular Health and Lowering Cholesterol   At least 150 minutes of moderate-intensity physical activity (for example, 30 minutes, 5 days a week), or   At least 75 minutes of vigorous-intensity physical activity (for example, 25 minutes, 3 days a week); or   A combination of moderate- and vigorous-intensity aerobic activity, and   At least 2 days of moderate- to high-intensity muscle-strengthening activities (such as resistance weight training) for additional health benefits    If you have thoughts of harming yourself or are otherwise in psychological crisis, do not hesitate to contact your County Crisis hotline, or 911 or go to the nearest emergency room.  Adult Crisis Numbers  Suicide Prevention  Hotline - Dial 9-8-8  [place]: [phone] or [phone]  [facility]: [phone]  [facility]: [phone]  [facility]: [phone]  [place]: [phone] or [phone]  [facility]: [phone]  [facility]: [phone] or [facility] Crisis: [phone]  [facility]: [phone] (daytime).       [phone] (after hours, weekends, holidays)     Child/Adolescent Crisis Numbers   [facility]: [phone]   [facility]: [phone]   [place], [place]: [phone]   [place]: [phone]  Please note: Some [facility] do not have a separate number for Child/Adolescent specific crisis. If your county is not listed under Child/Adolescent, please call the adult number for your county     National Talk to Text Line   [employer] - [phone]    National Suicide Prevention Hotline: 1-503.444.4468 or call 721.

## 2025-04-08 NOTE — PSYCH
Administrative Statements   Encounter provider Shad Agrawal MD    The Patient is located at Home and in the following state in which I hold an active license [place].    The patient was identified by name and date of birth. Grace Doherty was informed that this is a telemedicine visit and that the visit is being conducted through the Epic Embedded platform. She agrees to proceed..  My office door was closed. No one else was in the room.  She acknowledged consent and understanding of privacy and security of the video platform. The patient has agreed to participate and understands they can discontinue the visit at any time.    I have spent a total time of 25 minutes in caring for this patient on the day of the visit/encounter including Diagnostic results, Prognosis, Risks and benefits of tx options, Instructions for management, Patient and family education, and Importance of tx compliance, not including the time spent for establishing the audio/video connection.    _________________________________________________     Psychiatric Follow Up Visit - Behavioral Health  MRN: [id number]  Visit Time  Start: 0930 (9:30 am)  Stop: 0926  Total: ~26 minutes  Assessment & Plan  MDD (major depressive disorder), recurrent episode, mild (HCC)  Status: Not at goal, improving  Palpitations, most likely culprit is Cymbalta as symptoms started to worsen in 2023 when Cymbalta was increased from 60 to 90 mg.  Also tiredness began at this point in time.  No other changes were made at this point in time.  Likely cause is Cymbalta medication side effect    Cross taper Cymbalta for Lexapro as below (see order details):    Orders:    DULoxetine (CYMBALTA) 60 mg delayed release capsule; Take 60 mg (one 60 mg tablet every morning for 2 weeks then take 30 mg (one 30 mg tablet) for 2 weeks then stop the medication    escitalopram (LEXAPRO) 5 mg tablet; Take 1 tablet (one 5 mg tablet) when you are on the lowest dose of cymbalta then when you stop  cymbalta increase lexapro to 10 mg (2 of the 5 mg tablets) daily    ARIPiprazole (ABILIFY) 5 mg tablet; Take 1 tablet (5 mg total) by mouth daily    DULoxetine (CYMBALTA) 30 mg delayed release capsule; Take 60 mg (one 60 mg tablet) every morning for 2 weeks then take 30 mg (one 30 mg tablet) for 2 weeks then stop the medication    JADEN (generalized anxiety disorder)  Status: Not at goal, improving    See above plan  Orders:    LORazepam (ATIVAN) 1 mg tablet; Take 1 tablet (1 mg total) by mouth daily as needed for anxiety    DULoxetine (CYMBALTA) 60 mg delayed release capsule; Take 60 mg (one 60 mg tablet every morning for 2 weeks then take 30 mg (one 30 mg tablet) for 2 weeks then stop the medication    escitalopram (LEXAPRO) 5 mg tablet; Take 1 tablet (one 5 mg tablet) when you are on the lowest dose of cymbalta then when you stop cymbalta increase lexapro to 10 mg (2 of the 5 mg tablets) daily    traZODone (DESYREL) 50 mg tablet; Take 1 tablet (50 mg total) by mouth daily at bedtime    ARIPiprazole (ABILIFY) 5 mg tablet; Take 1 tablet (5 mg total) by mouth daily    DULoxetine (CYMBALTA) 30 mg delayed release capsule; Take 60 mg (one 60 mg tablet) every morning for 2 weeks then take 30 mg (one 30 mg tablet) for 2 weeks then stop the medication    Insomnia, unspecified type  Status: At goal -but since being on trazodone at 100 mg has had hypersomnia and grogginess in the morning, we will decrease the dose to 50 mg nightly and monitor    Decrease trazodone due to side effects (see order below for new dose)  Orders:    DULoxetine (CYMBALTA) 60 mg delayed release capsule; Take 60 mg (one 60 mg tablet every morning for 2 weeks then take 30 mg (one 30 mg tablet) for 2 weeks then stop the medication    traZODone (DESYREL) 50 mg tablet; Take 1 tablet (50 mg total) by mouth daily at bedtime    ARIPiprazole (ABILIFY) 5 mg tablet; Take 1 tablet (5 mg total) by mouth daily    DULoxetine (CYMBALTA) 30 mg delayed release capsule;  Take 60 mg (one 60 mg tablet) every morning for 2 weeks then take 30 mg (one 30 mg tablet) for 2 weeks then stop the medication    Cannabis abuse, daily use  Status: Not at goal, improving    Educated on cessation       Panic attacks  Status: At goal    See above plan  Orders:    escitalopram (LEXAPRO) 5 mg tablet; Take 1 tablet (one 5 mg tablet) when you are on the lowest dose of cymbalta then when you stop cymbalta increase lexapro to 10 mg (2 of the 5 mg tablets) daily    History of palpitations  Status: Not at goal    See above plan         Grace Doherty is a 24 y.o. female, Single with prior psychiatric diagnoses of anxiety, depression, insomnia (rule out bipolar), no past psychiatric hospitalizations, no past suicide attempts, h/o self-injurious behaviors (cutting), no h/o physical altercations, PMH significant for h/o lyme disease, gastritis, h/o esophageal candidiasis, daily medical marijuana use, and history of heavy alcohol use in college; with suicide risk factors including history of self-harm as recently as 2022 jan, chronic mental illness, medical problems, chronic pain, financial problems, anxiety, impulsivity, substance abuse and never .      In the setting of palpitations which have been present for an extended period of time and cardiologist recommended adjustment in medication as he has ruled out organic causes of palpitations. Most likely culprit is Cymbalta as symptoms started to worsen in 2023 when Cymbalta was increased from 60 to 90 mg.  Also tiredness began at this point in time.  No other changes were made at this point in time.  Likely cause is Cymbalta medication side effect.  In the setting of hypersomnia around 10 hours of sleep at night, she is agreeable with decreasing trazodone to 50 mg nightly, which may also benefit of these palpitations symptoms.  She was counseled again on decreasing and eliminating marijuana usage which she takes medically for relaxation she states and  "sleep.  She verbalized understanding.  Regarding Abilify, this medication will be continued at the current dosage at should palpitations be a concern after the cross taper from Cymbalta to Lexapro over the next 6 weeks, we will consider adjusting Abilify moving forward as this may have activating properties contributing to the palpitations.  Denies any EPS side effects or symptoms.      Medical  Follow-up with PCP / specialists for ongoing medical care.      Labs  Reviewed labs / imaging.  Continue monitoring CBC/diff, CMP, lipid profile, hemoglobin A1C, TSH and free T4 every 6 months    -------------------------------------------------------  SUBJECTIVE     Started when she got COVID 2023 - palpitations - then to chest pain. All day randomly increased.  Decreasing MJ use - less than 1/2 g a day - med MJ card.  Now is good time to switch because no classes.      Per chart review at 3/5/25 cardiology visit for syncope and tachycardia::   \"Tachycardia  She reports concerns of postural tachycardia  I counseled her extensively regarding   the physiology behind postural tachycardia,   the fact that at her age her heart rate \"go up to as high as 195 and be perfectly normal  Some of her current medications have propensity to cause both orthostatic hypotension and tachycardia (aripirazole, duloxetine), and that she should discuss these with her prescribing physician\"    Was thenn given heart monitor and cardiology states:  \"As discussed with you during the visit, this can be related to anxiety, your medications (aripiprazole, duloxetine, etc) and young age. I urge you to discuss this primarily with your psychiatrist to see if there is any adjustments possible with your medications and to help your anxiety simultaneously. \"    Since following up at previous visit, symptoms changed as follows:   Sleep: normal sleep; adequate number of sleep hours  Anxiety: has continued symptoms, 7/10 in severity (10 being most severe) - " "related to school / family  Depression: denies symptoms, 4/10 in severity (10 being most severe)  Intermittent racing heart / dizziness / passing out: improving  Panic Attacks: denies symptoms    Regarding drug/substance use:  Marijuana: see above 1 g / day decreasing usage  Alcohol: denies    Other:  Stressors: stable and manageable  Med. AE's: denies any side effects from medications.  ------------------------------------------  Rating Scales  None completed today.  IMPROVED SUBJECTIVE level of anxiety and depression compared to previous appointment    Psychiatric Review Of Systems:  Grace reports Symptoms as described in HPI  Grace denies Current suicidal thoughts, plan, or intent, Current thoughts of self-harm, or Current homicidal thoughts, plan, or intent    Medical Review Of Systems:  Complete review of systems is negative except as noted above.    Mental Status Exam:  Appearance:  alert, good eye contact, appears stated age, casually dressed, and appropriate grooming and hygiene   Behavior:  calm and cooperative   Motor: no abnormal movements and normal gait and balance   Speech:  spontaneous and coherent   Mood:  \"improving depression and anxiety\"   Affect:  mood-congruent   Thought Process:  Organized, logical, goal-directed   Thought Content: no verbalized delusions or overt paranoia   Perceptual disturbances: no reported hallucinations and does not appear to be responding to internal stimuli at this time   Risk Potential: No active or passive suicidal or homicidal ideation was verbalized during interview   Cognition: oriented to self and situation, appears to be of average intelligence, and cognition not formally tested   Insight:  Good   Judgment: Good     Past Medical History:   Diagnosis Date    Anemia     Anxiety     Asthma     hx of as child - outgrew     Depression     Gastritis     Gastroparesis     GERD (gastroesophageal reflux disease)     Lyme disease     Migraine       Past Surgical History: "   Procedure Laterality Date    ESOPHAGOGASTRODUODENOSCOPY      EGD with bipsy    OH CONIZATION CERVIX W/WO D&C RPR ELTRD EXC N/A 11/11/2024    Procedure: EXAM UNDER ANESTHESIA; BIOPSY LEEP CERVIX;  Surgeon: Ariella Madsen MD;  Location: [facility];  Service: Gynecology    UPPER GASTROINTESTINAL ENDOSCOPY       Visit Vitals  LMP 03/11/2025   OB Status Unknown   Smoking Status Never      Wt Readings from Last 6 Encounters:   03/05/25 74.4 kg (164 lb)   01/07/25 73.5 kg (162 lb)   11/25/24 72.6 kg (160 lb)   11/21/24 70.3 kg (155 lb)   11/11/24 70.3 kg (155 lb)   10/28/24 73.3 kg (161 lb 9.6 oz)      Labs & Imaging:  I have personally reviewed all pertinent laboratory tests and imaging results.   Ancillary Procedure on 03/05/2025   Component Date Value Ref Range Status    Heart rate minimum 03/05/2025 39  bpm Final    Heart rate maximum 03/05/2025 184  bpm Final    Heart rate (average) 03/05/2025 97  bpm Final    Ventricular tachycardia - number o* 03/05/2025 0   Final    Supraventricular tachycardia - num* 03/05/2025 0   Final    Pause - number of episodes 03/05/2025 0   Final    Isolated SVE frequency 03/05/2025 Rare   Final    Isolated SVE count 03/05/2025 133  episodes Final    Ectopic SVE isolated percent 03/05/2025 0.01  % Final    SVE couplets frequency 03/05/2025 0   Final    SVE couplets counts 03/05/2025 0  episodes Final    Ecptopic SVE couplet percent 03/05/2025 0  % Final    SVE triplets frequency 03/05/2025 0   Final    SVE triplets counts 03/05/2025 0  episodes Final    Ectopic SVE triplet percent 03/05/2025 0  % Final    Isolated VE frequency 03/05/2025 Rare   Final    Isolated VE counts 03/05/2025 60  episodes Final    Ectopic VE isolated percent 03/05/2025 0.01  % Final    VE couplets frequency 03/05/2025 0   Final    VE couplets counts 03/05/2025 0  episodes Final    Ecptopic VE couplet percent 03/05/2025 0  % Final    VE triplets frequency 03/05/2025 0   Final    VE triplets counts  03/05/2025 0  episodes Final    Ecptopic VE triplet percent 03/05/2025 0  % Final    Enrollment period start 03/05/2025 03/10/2025  7:38:58 AM EDT   Final    Enrollment period end 03/05/2025 03/17/2025  5:45:48 AM EDT   Final    Total enrollment period 03/05/2025 6 days 22 hours   Final    Device analysis time 03/05/2025 6 days 20 hours   Final    Total patient event diaries 03/05/2025 14   Final    Total patient event triggers 03/05/2025 11   Final    Combined report prescription status 03/05/2025 COMPLETE   Final     Meds / Allergies  Allergies   Allergen Reactions    Pineapple - Food Allergy Anaphylaxis    Penicillins Hives and Vomiting     Current Outpatient Medications   Medication Instructions    Altavera 0.15-30 MG-MCG per tablet 1 tablet, Oral, Daily    ARIPiprazole (ABILIFY) 5 mg, Oral, Daily    DULoxetine (CYMBALTA) 30 mg delayed release capsule TAKE 1 CAPSULE BY MOUTH EVERY DAY IN THE MORNING WITH 60MG CAPSULE FOR TOTAL DOSE OF 90MG    DULoxetine (CYMBALTA) 60 mg delayed release capsule Take 90 mg (one 30 mg tablet and one 60 mg tablet) every morning for a total of 90 mg daily    LORazepam (ATIVAN) 1 mg, Oral, Daily PRN    traZODone (DESYREL) 100 mg, Oral, Daily at bedtime,         -------------------------------------------------------  Medical Decision Making / Counseling / Coordination of Care:  Treatment Plan was previously completed and not due prior to the next visit.    Interventions recommended today: follow-up for regularly scheduled psychiatry appointments (and if needed, agreeable to move appointment sooner), if patient is in psychotherapy, continue with regularly scheduled individual psychotherapy appointments, and contracts for safety at present - agrees to call Crisis Intervention Service or go to ED if feeling unsafe; Psychoeducation provided to the patient and was educated about the importance of compliance with the medications and psychiatric treatment  Supportive psychotherapy provided  to the patient  Solution Focused Brief Therapy (SFBT) provided  Patient's emotions were validated and specific labeled praise provided.   [place] suggestions were offered in a supportive non-critical way. . Patient understands the importance of obtaining regular labs, maintaining routine follow-ups, reaching out to provider for any concerns, and going to ED for full evaluation if necessary.  We will continue with routine follow-ups and medication adjustments as appropriate.    Lethality Statement: Although patient's acute lethality risk is LOW, long-term/chronic lethality risk is mildly elevated given the risk factors listed above. However, at the current moment, Grace is future-oriented, forward-thinking, and demonstrates ability to act in a self-preserving manner as evidenced by volitionally seeking psychiatric evaluation and treatment today. To mitigate future risk, patient should adhere to treatment recommendations, avoid alcohol/illicit substance use, utilize community-based resources and familiar support, and prioritize mental health treatment. The diagnosis and treatment plan were reviewed with the patient. Risks, benefits, and alternatives to treatment were discussed and the importance of medication and treatment compliance was reviewed with the patient and they verbalize understanding and agreement for treatment.  Presently, patient denies suicidal/homicidal ideation in addition to thoughts of self-injury; contracts for safety, see below for risk assessment. At conclusion of evaluation, patient is amenable to the recommendations of this writer including: starting/continuing/adjusting psychotropic medications as prescribed.  Also, patient is amenable to calling/contacting the outpatient office including this writer if any acute adverse effects of their medication regimen arise in addition to any comments or concerns pertaining to their psychiatric management.  Patient is amenable to calling/contacting  crisis and/or attending to the nearest emergency department if their clinical condition deteriorates to assure their safety and stability, stating that they are able to appropriately confide in their supports regarding their psychiatric state.    -------------------------------------------------------  Therapy today: Individual supportive psychotherapy was provided at todays visit, I have spent 16 minutes providing psychotherapy    Controlled Medication Discussion: BENZODIAZEPINES and/or OPIOIDS: Grace has been filling controlled prescriptions on time as prescribed according to [place] Prescription Drug Monitoring Program. Discussed with Grace the risks of sedation, respiratory depression, impairment of ability to drive and potential for abuse and addiction related to treatment with benzodiazepine medications. She understands risk of treatment with benzodiazepine medications, agrees to not drive if feels impaired and agrees to take medications as prescribed. Discussed with Grace [place] warning on concurrent use of benzodiazepines and opioid medications including sedation, respiratory depression, coma and death. She understands the risk of treatment with benzodiazepines in addition to opioids and wants to continue taking those medications. Discussed with Grace increased risk of impairment related to concurrent use of benzodiazepines and hypnotic medications including excessive sedation, psychomotor impairment and respiratory depression. She understands the risk of treatment with benzodiazepines in addition to hypnotic medications and wants to continue taking those medications. Discussed with Grace need to slowly taper off benzodiazepines as recommended by [place] Prescription Drug Monitoring Program, due to concurrent use of opioid medications and increased risk of sedation, respiratory depression, coma and death with that combination  PDMP Review         Value Time User    PDMP Reviewed  Yes 3/6/2025   3:50 PM Shad Agrawal MD          -------------------------------------------------------  Historical Information:  Information is copied from the previous visit and updated today as appropriate.    Past Psychiatric History:    No significant PPH, no past psychiatric hospitalizations, no past suicide attempts, no h/o self-injurious behaviors, teen superficial cutting 2 months in setting of anxiety, jan 2022 ago cut again due to dropping out of masters program because disliked it, no h/o physical altercations. Currently in outpatient therapy through [facility] on weekly basis.    Past Med Trials:   Fluoxetine up to 40 mg daily (ineffective after some time)  Seroquel XR up to 150 mg qhs (drowsiness)  gabapentin (drowsy, hard to remember doses)  zoloft (ineffective after some time)        Family Psychiatric History:    Father- Bipolar I d/o (on Prozac, Seroquel)  Pat. Great Grandmother- Schizophrenia  Mother- Panic Attacks (Xanax, previously on Zoloft)  Mat. Grandmother- Anxiety  Maternal Side- Chronic abdominal pain     Denies substance abuse or suicidality in immediate relations.         Social History:  Domiciled with parents, brother (12 y/o) in [place]. Mother employed in [employer], father employed as pharmaceutical consultant. Reports infrequent caffeine use. No active substance use. Reports that she broke up with boyfriend in 2/2021.  No current relationships.        Substance Abuse History:  Alcohol- drinking occasionally on weekends, a couple of drinks, about once per week, limited alcohol use  Cannabis- Daily use morning until sleep, for anxiety.  No desire to quit, medical MJ she reports.        Traumatic History:  Denies any h/o physical or sexual abuse. The following portions of the patient's history were reviewed and updated as appropriate: allergies, current medications, past family history, past medical history, past social history, past surgical history and problem list.     Denies  access to lethal means / firearms.  -------------------------------------------------------  This note was not shared with the patient due to reasonable likelihood of causing patient harm    Note: This chart was completed utilizing speech software.  Grammatical errors, random word insertions, pronoun errors, and incomplete sentences are an occasional consequence of the system due to software limitation, ambient noise, and hardware issues.  Any formal questions or concerns about the context, text, or information contained within the body of this dictation should be directly addressed to the physician for clarification.    Shad Agrawal MD

## 2025-04-09 NOTE — TELEPHONE ENCOUNTER
Duplicate, patient has enough pills left complete tapering off oc cymbalta without a refill.    Dr. Agrawal

## 2025-04-16 DIAGNOSIS — Z30.41 ENCOUNTER FOR SURVEILLANCE OF CONTRACEPTIVE PILLS: ICD-10-CM

## 2025-04-17 RX ORDER — LEVONORGESTREL AND ETHINYL ESTRADIOL 0.15-0.03
1 KIT ORAL DAILY
Qty: 84 TABLET | Refills: 0 | Status: SHIPPED | OUTPATIENT
Start: 2025-04-17

## 2025-06-03 ENCOUNTER — TELEMEDICINE (OUTPATIENT)
Dept: OTHER | Facility: HOSPITAL | Age: 25
End: 2025-06-03
Payer: COMMERCIAL

## 2025-06-03 DIAGNOSIS — L24.81 IRRITANT CONTACT DERMATITIS DUE TO METALS: Primary | ICD-10-CM

## 2025-06-03 PROCEDURE — 99213 OFFICE O/P EST LOW 20 MIN: CPT | Performed by: PHYSICIAN ASSISTANT

## 2025-06-03 RX ORDER — TRIAMCINOLONE ACETONIDE 1 MG/G
CREAM TOPICAL 2 TIMES DAILY
Qty: 15 G | Refills: 0 | Status: SHIPPED | OUTPATIENT
Start: 2025-06-03

## 2025-06-03 NOTE — PATIENT INSTRUCTIONS
[facility] Phone number is [phone] if you need assistance or have further questions     Patient Education     Contact dermatitis   The Basics   Written by the doctors and editors at [facility]   What is dermatitis? -- Dermatitis is a type of skin rash that can happen after your skin touches something that irritates it or something you are allergic to.  Things that irritate the skin can be found in products that you use every day, such as soaps or cleansers. Some of the things that can cause skin allergies include:   Certain medicines, perfumes, or cosmetics   The metal in some kinds of jewelry   Plants, such as poison ivy and poison oak  Sometimes, you can develop a rash the first time you touch something. But it is also possible to get a rash from something that you have used before without any problems.  What other symptoms should I watch for? -- If you have a rash, your skin might be dry, itchy, or cracked (picture 1). In people with light skin, the rash is often red. In people with darker skin, it might appear purple, brown, gray, or black (picture 2). If your rash is caused by an allergy, you might also have some swelling or blisters where you have the rash.  Severe symptoms include:   Pain   Widespread swelling   Blisters, oozing, or crusting of the skin  Is there anything I can do on my own? -- Yes. You can:   Avoid using or touching whatever might have caused your rash.   Protect your skin from anything that might irritate it or cause an allergy. For example, wear gloves if you need to work with harsh soaps.   Use cool or warm water, not hot, for baths and showers. You can also try a special kind of bath called an oatmeal bath.   Try using soothing skin products to help with the itching and discomfort. Examples include thick moisturizing cream or petroleum jelly. Put this on your skin right after you get out of the bath or shower and after washing your hands.   Avoid scratching your skin. It might help  to:   Wear cotton gloves at night.   Keep your nails short and clean.   Cover the parts of your skin that itch.  How are skin rashes treated? -- Your doctor might prescribe different treatments or medicines to help your rash heal. These can include:   Steroid creams and ointments - These go on the skin, and they relieve itching and redness.   Steroid pills - You might need to take these for a short time if your rash is severe. But your doctor or nurse will want to take you off of the steroid pills as soon as possible. Even though these medicines help, they can also cause problems of their own.   Wet or damp dressings - These can be helpful for skin that is crusting or oozing. To use a wet or damp dressing, you need to wear 2 layers of clothing. First, put on a layer of damp cotton clothes over your rash. Then, put on a layer of dry clothes on top of the damp ones. People who need these dressings often wear them at night when they sleep.  When should I call the doctor? -- Call your doctor or nurse for advice if:   You have a rash that does not go away within 2 weeks.   Your rash gets worse or spreads over large parts of your body.   You have signs of infection like swelling, warmth, pain, or fever.  All topics are updated as new evidence becomes available and our peer review process is complete.  This topic retrieved from [employer] on: Feb 26, 2024.  Topic 99110 Version 12.0  Release: 32.2.4 - C32.56  © 2024 UpToDate, Inc. and/or its affiliates. All rights reserved.  picture 1: Chronic irritant contact dermatitis     If you have dermatitis, your skin might be red, dry, itchy, or cracked.  Graphic 699760 Version 2.0  picture 2: Dermatitis caused by nickel allergy     This person has an allergy to nickel, a type of metal. They have dermatitis where the button of their jeans touched their skin.  Graphic 193570 Version 1.0  Consumer Information Use and Disclaimer   Disclaimer: This generalized information is a limited  summary of diagnosis, treatment, and/or medication information. It is not meant to be comprehensive and should be used as a tool to help the user understand and/or assess potential diagnostic and treatment options. It does NOT include all information about conditions, treatments, medications, side effects, or risks that may apply to a specific patient. It is not intended to be medical advice or a substitute for the medical advice, diagnosis, or treatment of a health care provider based on the health care provider's examination and assessment of a patient's specific and unique circumstances. Patients must speak with a health care provider for complete information about their health, medical questions, and treatment options, including any risks or benefits regarding use of medications. This information does not endorse any treatments or medications as safe, effective, or approved for treating a specific patient. UpToDate, Inc. and its affiliates disclaim any warranty or liability relating to this information or the use thereof.The use of this information is governed by the Terms of Use, available at https://www.wolterskluwer.com/en/know/clinical-effectiveness-terms. 2024© UpToDate, Inc. and its affiliates and/or licensors. All rights reserved.  Copyright   © 2024 UpToDate, Inc. and/or its affiliates. All rights reserved.

## 2025-06-03 NOTE — PROGRESS NOTES
Virtual Regular Visit  Name: Grace Doherty      : 2000      MRN: [id number]  Encounter Provider: Shannon D Severino, PA-C  Encounter Date: 6/3/2025   Encounter department: VIRTUAL CARE       Verification of patient location:  Patient is located at Home in the following state in which I hold an active license [place] :  Assessment & Plan  Irritant contact dermatitis due to metals    Orders:    triamcinolone (KENALOG) 0.1 % cream; Apply topically 2 (two) times a day        Encounter provider Shannon D Severino, PA-C    The patient was identified by name and date of birth. Grace Doherty was informed that this is a telemedicine visit and that the visit is being conducted through the Epic Embedded platform. She agrees to proceed..  My office door was closed. No one else was in the room. She acknowledged consent and understanding of privacy and security of the video platform. The patient has agreed to participate and understands they can discontinue the visit at any time.    Patient is aware this is a billable service.     History obtained from: patient  History of Present Illness     Pt reports rash to L wrist after wearing costume jewelry a few days ago. Tried benadryl and cortisone with some relief of itching. Doesn't help with hives.      Review of Systems   Constitutional:  Negative for fever.   HENT:  Negative for nosebleeds.    Eyes:  Negative for redness.   Respiratory:  Negative for shortness of breath.    Cardiovascular:  Negative for chest pain.   Gastrointestinal:  Negative for blood in stool.   Genitourinary:  Negative for hematuria.   Musculoskeletal:  Negative for gait problem.   Skin:  Positive for rash. Negative for color change.   Neurological:  Negative for seizures.   Psychiatric/Behavioral:  Negative for behavioral problems.        Objective   There were no vitals taken for this visit.    Physical Exam  Constitutional:       General: She is not in acute distress.     Appearance: Normal  appearance. She is not toxic-appearing.   HENT:      Head: Normocephalic and atraumatic.      Nose: No rhinorrhea.      Mouth/Throat:      Mouth: Mucous membranes are moist.     Eyes:      Conjunctiva/sclera: Conjunctivae normal.     Pulmonary:      Effort: Pulmonary effort is normal. No respiratory distress.      Breath sounds: No wheezing (no gross audible wheeze through computer).     Musculoskeletal:      Cervical back: Normal range of motion.     Skin:     Findings: Rash (small urticarial lesions around L wrist in watch band pattern) present.     Neurological:      Mental Status: She is alert.      Cranial Nerves: No dysarthria or facial asymmetry.     Psychiatric:         Mood and Affect: Mood normal.         Behavior: Behavior normal.         Visit Time  Total Visit Duration: 4 minutes not including the time spent for establishing the audio/video connection.

## 2025-06-29 DIAGNOSIS — F33.1 MDD (MAJOR DEPRESSIVE DISORDER), RECURRENT EPISODE, MODERATE (HCC): ICD-10-CM

## 2025-06-29 DIAGNOSIS — F41.1 GAD (GENERALIZED ANXIETY DISORDER): ICD-10-CM

## 2025-06-29 DIAGNOSIS — F41.0 PANIC ATTACKS: ICD-10-CM

## 2025-06-30 ENCOUNTER — OFFICE VISIT (OUTPATIENT)
Dept: FAMILY MEDICINE CLINIC | Facility: CLINIC | Age: 25
End: 2025-06-30
Payer: COMMERCIAL

## 2025-06-30 VITALS
HEIGHT: 65 IN | SYSTOLIC BLOOD PRESSURE: 130 MMHG | HEART RATE: 100 BPM | DIASTOLIC BLOOD PRESSURE: 80 MMHG | BODY MASS INDEX: 28.66 KG/M2 | OXYGEN SATURATION: 98 % | WEIGHT: 172 LBS

## 2025-06-30 DIAGNOSIS — M25.562 ACUTE PAIN OF LEFT KNEE: Primary | ICD-10-CM

## 2025-06-30 PROCEDURE — 99213 OFFICE O/P EST LOW 20 MIN: CPT | Performed by: FAMILY MEDICINE

## 2025-06-30 RX ORDER — FAMOTIDINE 20 MG/1
20 TABLET, FILM COATED ORAL 2 TIMES DAILY
Qty: 28 TABLET | Refills: 0 | Status: SHIPPED | OUTPATIENT
Start: 2025-06-30

## 2025-06-30 RX ORDER — NABUMETONE 500 MG/1
500 TABLET, FILM COATED ORAL 2 TIMES DAILY
Qty: 28 TABLET | Refills: 0 | Status: SHIPPED | OUTPATIENT
Start: 2025-06-30

## 2025-06-30 RX ORDER — ESCITALOPRAM OXALATE 10 MG/1
10 TABLET ORAL DAILY
Qty: 90 TABLET | Refills: 1 | Status: SHIPPED | OUTPATIENT
Start: 2025-06-30 | End: 2025-07-01 | Stop reason: SDUPTHER

## 2025-06-30 NOTE — PROGRESS NOTES
Answers submitted by the patient for this visit:  Lower Extremity Injury Questionnaire (Submitted on 2025)  Chief Complaint: Lower extremity pain  Incident occurred: 5 to 7 days ago  Incident location: at home  Injury mechanism: unknown  Pain location: left knee  Pain quality: aching, burning, shooting  Pain - numeric: 5/10  Pain course: fluctuating  tingling: Yes  inability to bear weight: No  loss of motion: Yes  loss of sensation: No  muscle weakness: No  Foreign body present: no foreign bodies  Aggravated by: movement, palpation, weight bearing  Name: Grace Doherty      : 2000      MRN: [id number]  Encounter Provider: Richard Bess MD  Encounter Date: 2025   Encounter department: [facility]    :  Assessment & Plan  Acute pain of left knee    Orders:    nabumetone (RELAFEN) 500 mg tablet; Take 1 tablet (500 mg total) by mouth 2 (two) times a day    famotidine (PEPCID) 20 mg tablet; Take 1 tablet (20 mg total) by mouth 2 (two) times a day      Assessment & Plan  1. Left knee pain.  - Symptoms include aching, burning, shooting pain rated at 5 out of 10, fluctuating tingling, and discomfort with movement or touch, suggesting a potential soft tissue injury.  - Physical examination revealed lateral joint line tenderness, suprapatellar tenderness, and mild pain with certain movements, but no radiating pain to the toes.  - Discussed the use of ibuprofen (3 tablets, 3 times daily for 3 days) with Prilosec twice daily to prevent stomach issues. Anti-inflammatory medication prescribed for 2 weeks along with Pepcid twice daily.  - Recommended using a knee sleeve for 3 to 5 days during activity. If symptoms persist after 2 weeks, further diagnostics such as x-rays, physical therapy, orthopedic consultation, or injections may be considered.           History of Present Illness     History of Present Illness  The patient presents for evaluation of left knee pain.    Approximately 1.5  "weeks ago, she began experiencing an unusual sensation in her left leg, describing it as feeling akin to her entire leg being numb up to the knee. This sensation would intermittently subside and return daily. A few days ago, she started experiencing pain in her left knee, which she describes as a feeling of looseness. The pain is exacerbated by movement or touch and occasionally accompanied by a popping sound. She has been managing the pain with ice, rest, and compression but has not taken ibuprofen or naproxen due to gastrointestinal discomfort. Instead, she has been using Tylenol sparingly, which has provided minimal relief. Her daily activities involve frequent walking and standing.     Review of Systems   Musculoskeletal:  Positive for arthralgias.     Objective   /80   Pulse 100   Ht 5' 5\" (1.651 m)   Wt 78 kg (172 lb)   SpO2 98%   BMI 28.62 kg/m²     Physical Exam  - Musculoskeletal: Lateral joint line tenderness and suprapatellar tenderness in the left knee  - No pain radiating down to the toes  Physical Exam    "

## 2025-07-01 ENCOUNTER — TELEMEDICINE (OUTPATIENT)
Dept: PSYCHIATRY | Facility: CLINIC | Age: 25
End: 2025-07-01
Payer: COMMERCIAL

## 2025-07-01 ENCOUNTER — TELEPHONE (OUTPATIENT)
Dept: PSYCHIATRY | Facility: CLINIC | Age: 25
End: 2025-07-01

## 2025-07-01 DIAGNOSIS — F12.10 CANNABIS ABUSE, DAILY USE: ICD-10-CM

## 2025-07-01 DIAGNOSIS — Z13.21 SCREENING FOR ENDOCRINE, NUTRITIONAL, METABOLIC AND IMMUNITY DISORDER: ICD-10-CM

## 2025-07-01 DIAGNOSIS — Z13.29 SCREENING FOR ENDOCRINE, NUTRITIONAL, METABOLIC AND IMMUNITY DISORDER: ICD-10-CM

## 2025-07-01 DIAGNOSIS — F41.0 PANIC ATTACKS: ICD-10-CM

## 2025-07-01 DIAGNOSIS — Z13.0 SCREENING FOR ENDOCRINE, NUTRITIONAL, METABOLIC AND IMMUNITY DISORDER: ICD-10-CM

## 2025-07-01 DIAGNOSIS — F33.1 MDD (MAJOR DEPRESSIVE DISORDER), RECURRENT EPISODE, MODERATE (HCC): Primary | ICD-10-CM

## 2025-07-01 DIAGNOSIS — Z13.228 SCREENING FOR ENDOCRINE, NUTRITIONAL, METABOLIC AND IMMUNITY DISORDER: ICD-10-CM

## 2025-07-01 DIAGNOSIS — Z87.898 HISTORY OF PALPITATIONS: ICD-10-CM

## 2025-07-01 DIAGNOSIS — E55.9 VITAMIN D DEFICIENCY: ICD-10-CM

## 2025-07-01 DIAGNOSIS — G47.00 INSOMNIA, UNSPECIFIED TYPE: ICD-10-CM

## 2025-07-01 DIAGNOSIS — F41.1 GAD (GENERALIZED ANXIETY DISORDER): ICD-10-CM

## 2025-07-01 PROCEDURE — 99214 OFFICE O/P EST MOD 30 MIN: CPT | Performed by: PSYCHIATRY & NEUROLOGY

## 2025-07-01 PROCEDURE — 90833 PSYTX W PT W E/M 30 MIN: CPT | Performed by: PSYCHIATRY & NEUROLOGY

## 2025-07-01 RX ORDER — ARIPIPRAZOLE 5 MG/1
7.5 TABLET ORAL DAILY
Qty: 135 TABLET | Refills: 1 | Status: SHIPPED | OUTPATIENT
Start: 2025-07-01 | End: 2025-09-29

## 2025-07-01 RX ORDER — TRAZODONE HYDROCHLORIDE 50 MG/1
50 TABLET ORAL
Qty: 90 TABLET | Refills: 1 | Status: SHIPPED | OUTPATIENT
Start: 2025-07-01

## 2025-07-01 RX ORDER — ESCITALOPRAM OXALATE 10 MG/1
15 TABLET ORAL DAILY
Qty: 135 TABLET | Refills: 1 | Status: SHIPPED | OUTPATIENT
Start: 2025-07-01

## 2025-07-01 RX ORDER — LORAZEPAM 1 MG/1
1 TABLET ORAL DAILY PRN
Qty: 30 TABLET | Refills: 0 | Status: SHIPPED | OUTPATIENT
Start: 2025-07-01

## 2025-07-01 NOTE — BH CRISIS PLAN
Client Name: Grace Doherty       Client YOB: 2000    [facility] Safety Plan      Creation Date: 2/16/24 Update Date: 2/16/24   Created By: Shad Agrawal MD Last Updated By: Shad Agrawal MD      Step 1: Warning Signs:   Warning Signs   eating too much   sleeping too much            Step 2: Internal Coping Strategies:   Internal Coping Strategies   getting movement, yoga   light mediation with music            Step 3: People and social settings that provide distraction:   Name Contact Information   Bernardino friend call him   Grandmother lola to her    Places   Bedroom   [place]town [place]           Step 4: People whom I can ask for help during a crisis:      Name Contact Information    Kellie Doherty (Mother) [phone] (Mobile)     Bernardino     Grandmother       Step 5: Professionals or agencies I can contact during a crisis:      [place]/Agency Name Phone Emergency Contact    Suicide Prevention Lifeline: Call or Text 196 368          Crisis Phone Numbers:   Suicide Prevention Lifeline: Call or Text  988 Crisis Text Line: Text HOME to 046-346   Please note: Some [facility] do not have a separate number for Child/Adolescent specific crisis. If your county is not listed under Child/Adolescent, please call the adult number for your county      Adult Crisis Numbers: Child/Adolescent Crisis Numbers   [facility]: [phone] [facility]: [phone]   [facility]: [phone] [facility]: [phone]   [facility]: [phone] [place]: [phone]   [facility]: [phone] [place]: [phone]   [place]/[facility]: [phone]   [facility]: [phone]   [facility]: [phone]   [place] [facility]: [phone] (daytime) [phone] (after hours, weekends, holidays)      Step 6: Making the environment safer (plan for lethal means safety):   Plan: Sharps things are put away     Optional: What is most important to me  and worth living for?   Baby cat, family     [facility] Safety Plan. Tessy Marcelino and John Adler. Used with permission of the authors.

## 2025-07-01 NOTE — PATIENT INSTRUCTIONS
"    Please call the office nursing staff for medication issues including refills, problems getting medications, bothersome side effects, etc., at [phone].     Please return for a follow up appointment as discussed and arrive approximately 15 minutes prior to your appointment time. If you are running late or are unable to attend your appointment, please call our [place] office at [phone] (fax: [phone]), or if you were seen in the [facility] office, please call [phone] (fax [phone]).    National Suicide Prevention Hotline: 1-220.933.8317 or call 658.    To improve sleep:  - Keep a consistent sleep schedule. Get up at the same time every day, even on weekends or during vacations.  - Set a bedtime that is early enough for you to get at least 7-8 hours of sleep.  - Don’t go to bed unless you are sleepy.  - If you don’t fall asleep after 20 minutes, get out of bed and take a brief \"break\". Go to a quiet activity without a lot of light exposure. It is especially important to not get on electronics. During this \"break,\" you might consider sitting in a chair and reading a boring book or listening to soft music, until your eyelids start to feel heavy.  Then, would go back to sleep and try again.    - Establish a relaxing bedtime routine.  - Make your bedroom quiet and relaxing. Keep the room at a comfortable, cool temperature.  - Limit exposure to bright light in the evenings.  - Turn off electronic devices at least 30 minutes before bedtime.  - Avoid watching stressful or suspenseful TV programs right before bedtime.  - Don’t eat a large meal before bedtime. If you are hungry at night, eat a light, healthy snack.  - Exercise regularly and maintain a healthy diet.  Participate in regular physical activities like exercise, although avoid approximately 3-4 hours prior to bed.  Morning exercise is ideal.  - Avoid consuming caffeine in the afternoon or evening.  - Avoid consuming alcohol " "before bedtime.  - Reduce your fluid intake before bedtime.  - Avoid daytime napping.  - Consider reading \"No More Sleepless nights\" by John Cook, Ph.D.  - Consider use of online resources including:  - http://PollitoIngles/cbt-online-insomnia-treatment.html  - http://www.[employer]  - CBT-I  Magaly on your Smart Phone.  - Go! To Sleep by the [facility].    Look up \"grounding techniques\" and/or \"anchoring demonstration\" online and try a few to see what may work for you. Practice these skills before you need them, when you are not feeling too anxious or triggered. You can also search for free guided meditation videos online to help improve your head space when you are feeling very anxious or triggered.      Healthy Diet   The American Heart Association and the American College of Cardiology have long recommended a healthy diet for not only patients who are at risk for atherosclerotic cardiovascular disease (ASCVD) but also the general public. In keeping with this evidence-based recommendation, the \"2018 Guideline on the Management of Blood Cholesterol\" stresses that a healthy diet should include adequate intake of these essentials:   Vegetables, fruits, and whole grains   Legumes and nuts   Low-fat dairy products   Low-fat poultry (without the skin)   Fish and seafood   Nontropical vegetable oils     The recent guidelines do provide room for cultural food preferences in a healthy diet, but in general, all patients should limit their intake of saturated and avoid all trans fats, sweets, sugar-sweetened beverages, and red meats.  Please maintain adequate hydration of at least 2 Liters of water per day, and improve nutrition by decreasing portion sizes, avoiding fried foods, fast foods, inappropriate snacking and overly processed and packaged items with 'added sugars' (whether in drinks or foods), and observing nutritional facts information on any items that are packaged to reduce intake of saturated fats and " AVOID trans fats as mentioned above. Again, consume whole foods such as vegetables, higher lean protein intake, and using healthier lifestyle plans such as the Mediterranean diet with healthier fats such as those from seeds, nuts, and using organic extra virgin olive oil or avocado oil in lieu of processed vegetable oils or margarine.    Physical Activity   Recommended DAILY exercise for at least 150 minutes of moderate exercise weekly!  In addition to a healthy diet, all patients should include regular physical activity in their weekly routines, at moderate to vigorous intensity. Any activity is better than nothing, so if your patients can't meet the recommendation of vigorous activity, moderate-intensity activity can still help them reduce their risk of ASCVD.     Below are the American Heart Association's recommendations for physical activity per week (preferably spread throughout the week):     For Overall Cardiovascular Health and Lowering Cholesterol   At least 150 minutes of moderate-intensity physical activity (for example, 30 minutes, 5 days a week), or   At least 75 minutes of vigorous-intensity physical activity (for example, 25 minutes, 3 days a week); or   A combination of moderate- and vigorous-intensity aerobic activity, and   At least 2 days of moderate- to high-intensity muscle-strengthening activities (such as resistance weight training) for additional health benefits    If you have thoughts of harming yourself or are otherwise in psychological crisis, do not hesitate to contact your County Crisis hotline, or 911 or go to the nearest emergency room.  Adult Crisis Numbers  Suicide Prevention Hotline - Dial 9-8-8  [facility]: [phone] or [phone]  [facility]: [phone]  [facility]: [phone]  [facility]: [phone]  [place]/[facility]: [phone] or [phone]  [facility]: [phone]  [facility]: [phone] or [facility] Crisis:  [phone]  [place] [facility]: [phone] (daytime).       [phone] (after hours, weekends, holidays)     Child/Adolescent Crisis Numbers   [facility]: [phone]   [facility]: [phone]   [place], [place]: [phone]   [place]/[facility]: [phone]  Please note: Some [facility] do not have a separate number for Child/Adolescent specific crisis. If your county is not listed under Child/Adolescent, please call the adult number for your county     National Talk to Text Line   [facility] - [phone]    National Suicide Prevention Hotline: 1-741.711.6266 or call 727.

## 2025-07-01 NOTE — PSYCH
Administrative Statements   Encounter provider Shad Agrawal MD    The Patient is located at Home and in the following state in which I hold an active license [place].    The patient was identified by name and date of birth. Grace Doherty was informed that this is a telemedicine visit and that the visit is being conducted through the Epic Embedded platform. She agrees to proceed..  My office door was closed. No one else was in the room.  She acknowledged consent and understanding of privacy and security of the video platform. The patient has agreed to participate and understands they can discontinue the visit at any time.    I have spent a total time of 26 minutes in caring for this patient on the day of the visit/encounter including Diagnostic results, Prognosis, Risks and benefits of tx options, Instructions for management, Patient and family education, Importance of tx compliance, and Risk factor reductions, not including the time spent for establishing the audio/video connection.    _________________________________________________     Psychiatric Follow Up Visit - Behavioral Health  MRN: [id number]  Visit Time  Start: 0930 (9:30 am)  Stop: 0956  Total: ~26 minutes  Assessment & Plan  MDD (major depressive disorder), recurrent episode, mild (HCC)  Status: Not at goal, worsening due to recent break-up, stressors, and Lexapro not fully optimized    Increase lexapro to 15 mg daily due to continued symptoms  (see order below for new dose)  Increase abilify to 7.5 mg daily due to continued symptoms  (see order below for new dose) -for more immediate control of anxiety       JADEN (generalized anxiety disorder)  Status: Not at goal, worsening    See above plan  Orders:    LORazepam (ATIVAN) 1 mg tablet; Take 1 tablet (1 mg total) by mouth daily as needed for anxiety    Cannabis abuse, daily use  Status: Not at goal    Educated on cessation       Panic attacks  Status: Not at goal    See above plan       Insomnia,  unspecified type  Status: At goal    Continue medications as prescribed       History of palpitations  Status: At goal, resolved when Cymbalta stopped       Screening for endocrine, nutritional, metabolic and immunity disorder         Vitamin D deficiency            Grace Doherty is a 24 y.o.  female, Single with prior psychiatric diagnoses of anxiety, depression, insomnia (rule out bipolar), no past psychiatric hospitalizations, no past suicide attempts, h/o self-injurious behaviors (cutting), no h/o physical altercations, PMH significant for h/o lyme disease, gastritis, h/o esophageal candidiasis, daily medical marijuana use, and history of heavy alcohol use in college; with suicide risk factors including history of self-harm as recently as 2022 jan, chronic mental illness, medical problems, chronic pain, financial problems, anxiety, impulsivity, substance abuse and never .      In the setting of patient's report that stopping Cymbalta and initiating Lexapro has significantly help the palpitations and side effects that she was experiencing but she is still complaining of significant anxiety and depression that seem to have gotten slightly worse since her previous appointment especially in the setting of elevated psychosocial stressors, recent break-up occurring this morning prior to the appointment, and difficulty with her job search which continues due too bad job market for her specialty.  He is agreeable with optimizing Lexapro up to a dose of 15 mg and due to significant symptoms of anxiety and depression which are both moderate to severe on subjective and objective measurements, she is agreeable with initiating Abilify up to a dose of 7.5 mg daily.  She understands the importance of getting routine laboratory test completed prior to follow-up visit and they will be ordered today in order to rule out any organic causes of her symptoms along with rule out any cause for the tiredness that she is  experiencing.  She was counseled extensively on discontinuing medical marijuana usage as this may be contributing to her symptoms and she verbalized understanding and agreement.  Adamantly denies any desires for self-harm and denies any SI, HI, AVH.      Medical  Follow-up with PCP / specialists for ongoing medical care.      Labs  Reviewed labs / imaging.  Continue monitoring CBC/diff, CMP, lipid profile, hemoglobin A1C, TSH and free T4 every 6 months  -------------------------------------------------------  SUBJECTIVE     Breakup today, tearful today.  Job searching for a job. Graduated school so no work now.    Cross tapering cymbalta for  lexapro at previous visit, symptoms changed as follows:   Sleep: normal sleep; adequate number of sleep hours  Anxiety: has symptoms, improving, WOULD like a med adjustment, 7/10 in severity (10 being most severe)  Depression: has symptoms, improving, WOULD like a med adjustment, 7/10 in severity (10 being most severe)  Intermittent racing heart / dizziness / passing out: denies  Panic Attacks: denies symptoms  Irritability: has continued symptoms, worsened, would like med increase - sometime feels a strong rage    Regarding drug/substance use:  Marijuana: 1 g / day on average, medical MJ  Alcohol: socially    Regarding PRN medications:  Reports taking as needed ativan 1 times a month for anxiety / panic / heightened anxiety episodes    Other:  Stressors: many stressors and stable and manageable  Med. AE's: denies any side effects from medications.  ------------------------------------------  Rating Scales  Current JADEN-7 is   JADEN-7 Flowsheet Screening      Flowsheet Row Most Recent Value   Over the last two weeks, how often have you been bothered by the following problems?     Feeling nervous, anxious, or on edge 1   Not being able to stop or control worrying 1   Worrying too much about different things 2   Trouble relaxing  1   Being so restless that it's hard to sit still 3  "  Becoming easily annoyed or irritable  3   Feeling afraid as if something awful might happen 1   How difficult have these problems made it for you to do your work, take care of things at home, or get along with other people?  Very difficult   JADEN Score  12           Current PHQ-9   PHQ-2/9 Depression Screening    Little interest or pleasure in doing things: 3 - nearly every day  Feeling down, depressed, or hopeless: 2 - more than half the days  Trouble falling or staying asleep, or sleeping too much: 3 - nearly every day  Feeling tired or having little energy: 3 - nearly every day  Poor appetite or overeating: 3 - nearly every day  Feeling bad about yourself - or that you are a failure or have let yourself or your family down: 2 - more than half the days  Trouble concentrating on things, such as reading the newspaper or watching television: 2 - more than half the days  Moving or speaking so slowly that other people could have noticed. Or the opposite - being so fidgety or restless that you have been moving around a lot more than usual: 0 - not at all  Thoughts that you would be better off dead, or of hurting yourself in some way: 0 - not at all  PHQ-9 Score: 18  PHQ-9 Interpretation: Moderately severe depression         Psychiatric Review Of Systems:  Grace reports Symptoms as described in HPI  Grace denies Current suicidal thoughts, plan, or intent, Current thoughts of self-harm, or Current homicidal thoughts, plan, or intent    Medical Review Of Systems:  Complete review of systems is negative except as noted above.    Mental Status Exam:  Appearance:  alert, good eye contact, appears stated age, casually dressed, and appropriate grooming and hygiene   Behavior:  calm and cooperative   Motor: no abnormal movements and normal gait and balance   Speech:  spontaneous and coherent   Mood:  \"worsening depression and anxiety\"   Affect:  constricted   Thought Process:  Organized, logical, goal-directed   Thought " Content: no verbalized delusions or overt paranoia   Perceptual disturbances: no reported hallucinations and does not appear to be responding to internal stimuli at this time   Risk Potential: No active or passive suicidal or homicidal ideation was verbalized during interview   Cognition: oriented to self and situation, appears to be of average intelligence, and cognition not formally tested   Insight:  Good   Judgment: Good   Past Medical History[1]   Past Surgical History[2]  Visit Vitals  OB Status Unknown   Smoking Status Never      Wt Readings from Last 6 Encounters:   06/30/25 78 kg (172 lb)   03/05/25 74.4 kg (164 lb)   01/07/25 73.5 kg (162 lb)   11/25/24 72.6 kg (160 lb)   11/21/24 70.3 kg (155 lb)   11/11/24 70.3 kg (155 lb)      Labs & Imaging:  I have personally reviewed all pertinent laboratory tests and imaging results.   No visits with results within 2 Month(s) from this visit.   Latest known visit with results is:   Ancillary Procedure on 03/05/2025   Component Date Value Ref Range Status    Heart rate minimum 03/05/2025 39  bpm Final    Heart rate maximum 03/05/2025 184  bpm Final    Heart rate (average) 03/05/2025 97  bpm Final    Ventricular tachycardia - number o* 03/05/2025 0   Final    Supraventricular tachycardia - num* 03/05/2025 0   Final    Pause - number of episodes 03/05/2025 0   Final    Isolated SVE frequency 03/05/2025 Rare   Final    Isolated SVE count 03/05/2025 133  episodes Final    Ectopic SVE isolated percent 03/05/2025 0.01  % Final    SVE couplets frequency 03/05/2025 0   Final    SVE couplets counts 03/05/2025 0  episodes Final    Ecptopic SVE couplet percent 03/05/2025 0  % Final    SVE triplets frequency 03/05/2025 0   Final    SVE triplets counts 03/05/2025 0  episodes Final    Ectopic SVE triplet percent 03/05/2025 0  % Final    Isolated VE frequency 03/05/2025 Rare   Final    Isolated VE counts 03/05/2025 60  episodes Final    Ectopic VE isolated percent 03/05/2025 0.01   % Final    VE couplets frequency 03/05/2025 0   Final    VE couplets counts 03/05/2025 0  episodes Final    Ecptopic VE couplet percent 03/05/2025 0  % Final    VE triplets frequency 03/05/2025 0   Final    VE triplets counts 03/05/2025 0  episodes Final    Ecptopic VE triplet percent 03/05/2025 0  % Final    Enrollment period start 03/05/2025 03/10/2025  7:38:58 AM EDT   Final    Enrollment period end 03/05/2025 03/17/2025  5:45:48 AM EDT   Final    Total enrollment period 03/05/2025 6 days 22 hours   Final    Device analysis time 03/05/2025 6 days 20 hours   Final    Total patient event diaries 03/05/2025 14   Final    Total patient event triggers 03/05/2025 11   Final    Combined report prescription status 03/05/2025 COMPLETE   Final     Meds / Allergies  Allergies[3]  Current Outpatient Medications   Medication Instructions    Altavera 0.15-30 MG-MCG per tablet 1 tablet, Oral, Daily    ARIPiprazole (ABILIFY) 5 mg, Oral, Daily    escitalopram (LEXAPRO) 10 mg, Oral, Daily    famotidine (PEPCID) 20 mg, Oral, 2 times daily    LORazepam (ATIVAN) 1 mg, Oral, Daily PRN    nabumetone (RELAFEN) 500 mg, Oral, 2 times daily    traZODone (DESYREL) 50 mg, Oral, Daily at bedtime,         -------------------------------------------------------  Medical Decision Making / Counseling / Coordination of Care:  Treatment Plan was completed and signed.    Interventions recommended today: follow-up for regularly scheduled psychiatry appointments (and if needed, agreeable to move appointment sooner), if patient is in psychotherapy, continue with regularly scheduled individual psychotherapy appointments, and contracts for safety at present - agrees to call Crisis Intervention Service or go to ED if feeling unsafe; Psychoeducation provided to the patient and was educated about the importance of compliance with the medications and psychiatric treatment  Supportive psychotherapy provided to the patient  Solution Focused Brief Therapy  (SFBT) provided  Patient's emotions were validated and specific labeled praise provided.   Urania suggestions were offered in a supportive non-critical way. . Patient understands the importance of obtaining regular labs, maintaining routine follow-ups, reaching out to provider for any concerns, and going to ED for full evaluation if necessary.  We will continue with routine follow-ups and medication adjustments as appropriate.    Lethality Statement: Although patient's acute lethality risk is LOW, long-term/chronic lethality risk is mildly elevated given the risk factors listed above. However, at the current moment, Grace is future-oriented, forward-thinking, and demonstrates ability to act in a self-preserving manner as evidenced by volitionally seeking psychiatric evaluation and treatment today. To mitigate future risk, patient should adhere to treatment recommendations, avoid alcohol/illicit substance use, utilize community-based resources and familiar support, and prioritize mental health treatment. The diagnosis and treatment plan were reviewed with the patient. Risks, benefits, and alternatives to treatment were discussed and the importance of medication and treatment compliance was reviewed with the patient and they verbalize understanding and agreement for treatment.  Presently, patient denies suicidal/homicidal ideation in addition to thoughts of self-injury; contracts for safety, see below for risk assessment. At conclusion of evaluation, patient is amenable to the recommendations of this writer including: starting/continuing/adjusting psychotropic medications as prescribed.  Also, patient is amenable to calling/contacting the outpatient office including this writer if any acute adverse effects of their medication regimen arise in addition to any comments or concerns pertaining to their psychiatric management.  Patient is amenable to calling/contacting crisis and/or attending to the nearest emergency  department if their clinical condition deteriorates to assure their safety and stability, stating that they are able to appropriately confide in their supports regarding their psychiatric state.    -------------------------------------------------------  Therapy today: Individual supportive psychotherapy was provided at todays visit, I have spent 16 minutes providing psychotherapy    Controlled Medication Discussion: BENZODIAZEPINES and/or OPIOIDS: Grace has been filling controlled prescriptions on time as prescribed according to [place] Prescription Drug Monitoring Program. Discussed with Grace the risks of sedation, respiratory depression, impairment of ability to drive and potential for abuse and addiction related to treatment with benzodiazepine medications. She understands risk of treatment with benzodiazepine medications, agrees to not drive if feels impaired and agrees to take medications as prescribed. Discussed with Grace Black Box warning on concurrent use of benzodiazepines and opioid medications including sedation, respiratory depression, coma and death. She understands the risk of treatment with benzodiazepines in addition to opioids and wants to continue taking those medications. Discussed with Grace increased risk of impairment related to concurrent use of benzodiazepines and hypnotic medications including excessive sedation, psychomotor impairment and respiratory depression. She understands the risk of treatment with benzodiazepines in addition to hypnotic medications and wants to continue taking those medications. Discussed with Grace need to slowly taper off benzodiazepines as recommended by [place] Prescription Drug Monitoring Program, due to concurrent use of opioid medications and increased risk of sedation, respiratory depression, coma and death with that combination  PDMP Review         Value Time User    PDMP Reviewed  Yes 4/8/2025  9:47 AM Shad Agrawal MD           -------------------------------------------------------  Historical Information    Information is copied from the previous visit and updated today as appropriate.    Past Psychiatric History:    No significant PPH, no past psychiatric hospitalizations, no past suicide attempts, no h/o self-injurious behaviors, teen superficial cutting 2 months in setting of anxiety, jan 2022 ago cut again due to dropping out of [employer] program because disliked it, no h/o physical altercations. Currently in outpatient therapy through [facility] on weekly basis.    Past Med Trials:   Fluoxetine up to 40 mg daily (ineffective after some time)  Seroquel XR up to 150 mg qhs (drowsiness)  gabapentin (drowsy, hard to remember doses)  zoloft (ineffective after some time)        Family Psychiatric History:    Father- Bipolar I d/o (on Prozac, Seroquel)  Pat. Great Grandmother- Schizophrenia  Mother- Panic Attacks (Xanax, previously on Zoloft)  Mat. Grandmother- Anxiety  Maternal Side- Chronic abdominal pain     Denies substance abuse or suicidality in immediate relations.         Social History:  Domiciled with parents, brother (14 y/o) in [place]. Mother employed in [employer], father employed as pharmaceutical consultant. Reports infrequent caffeine use. No active substance use.     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  No desire to quit, medical MJ she reports.  Still using 1 g a day regularly for years.        Traumatic History:  Denies any h/o physical or sexual abuse. The following portions of the patient's history were reviewed and updated as  appropriate: allergies, current medications, past family history, past medical history, past social history, past surgical history and problem list.     Denies access to lethal means / firearms.  -------------------------------------------------------  This note was not shared with the patient due to reasonable likelihood of causing patient harm    Note: This chart was completed utilizing speech software.  Grammatical errors, random word insertions, pronoun errors, and incomplete sentences are an occasional consequence of the system due to software limitation, ambient noise, and hardware issues.  Any formal questions or concerns about the context, text, or information contained within the body of this dictation should be directly addressed to the physician for clarification.    Shad Agrawal MD         [1]   Past Medical History:  Diagnosis Date    Anemia     Anxiety     Asthma     hx of as child - outgrew     Depression     Gastritis     Gastroparesis     GERD (gastroesophageal reflux disease)     Lyme disease     Migraine    [2]   Past Surgical History:  Procedure Laterality Date    ESOPHAGOGASTRODUODENOSCOPY      EGD with bipsy    DE CONIZATION CERVIX W/WO D&C RPR ELTRD EXC N/A 11/11/2024    Procedure: EXAM UNDER ANESTHESIA; BIOPSY LEEP CERVIX;  Surgeon: Ariella Madsen MD;  Location: [facility];  Service: Gynecology    UPPER GASTROINTESTINAL ENDOSCOPY     [3]   Allergies  Allergen Reactions    Pineapple - Food Allergy Anaphylaxis    Penicillins Hives and Vomiting    Nickel Rash

## 2025-07-01 NOTE — TELEPHONE ENCOUNTER
Patient requested refill of :  Requested Prescriptions     Pending Prescriptions Disp Refills    escitalopram (LEXAPRO) 10 mg tablet [Pharmacy Med Name: ESCITALOPRAM 5 MG TABLET] 90 tablet 1     Sig: Take 1 tablet (10 mg total) by mouth daily         Refill request approved and sent to pharmacy on file per patient request.     Shad Agrawal MD

## 2025-07-01 NOTE — TELEPHONE ENCOUNTER
Called and spoke to patient to inform patient of conflict with f/u date spoken about with provider, as patient has another appt on same day, around same time.    F/u scheduled for 8/6/25 @ 11:30 AM virtual.

## 2025-07-01 NOTE — BH TREATMENT PLAN
TREATMENT PLAN        [facility]    Name and Date of Birth:  Grace Doherty 24 y.o. 2000  Date of Treatment Plan: July 1, 2025  Diagnosis/Diagnoses:    1. MDD (major depressive disorder), recurrent episode, mild (HCC)    2. JADEN (generalized anxiety disorder)    3. Cannabis abuse, daily use    4. Panic attacks    5. Insomnia, unspecified type    6. History of palpitations    7. Screening for endocrine, nutritional, metabolic and immunity disorder    8. Vitamin D deficiency        Strengths/Personal Resources for Self-Care: taking medications as prescribed, ability to adapt to life changes, ability to communicate needs, ability to communicate well, ability to listen, ability to reason, ability to understand psychiatric illness, financial means, independence, motivation for treatment, ability to negotiate basic needs, being resoureceful, self-reliance, well educated, willingness to work on problems, work skills     Area/Areas of need: anxiety, anxiety symptoms, depression, depressive symptoms, mood instability, sleep problems, lack of energy, lack of motivation, lack of sleep, stress at work, substance abuse, unstable mood     Long Term Goal: improve anxiety, improve depression, improve mood, improve sleep, improve impulse control, Feel better about self, Feel calmer, Feel happier, Feel more positive about the future, Free of suicidal thoughts, and No self abusive behavior  Target Date: 6 months - January 1, 2026  Person/Persons responsible for completion of goal: Grace and Shad Agrawal MD     Short Term Objective (s) - How will we reach this goal?:   Take medications as prescribed  Attend psychiatry appointments regularly  Get blood work drawn  Continue psychotherapy regularly  Practice coping skills  Try sleep hygiene techniques  Avoid alcohol   Avoid drugs   Take walks regularly  Try breathing exercises  Try relaxation techniques  Target Date: 6 months -  January 1, 2026  Person/Persons Responsible for Completion of Goal: Grace     Progress Towards Goals: Continuing treatment    Treatment Modality: medication management every 1-3 months as needed, continue psychotherapy (if patient is currently involved in therapy), and follow up with PCP regularly  Review due 180 days from date of this plan: December 28, 2025   Expected length of service: Ongoing treatment    My physician and I have developed this plan together, and I agree to work on the goals and objectives. I understand the treatment goals that were developed for my treatment.    The treatment plan was created between Shad Agrawal MD and Grace Doherty on 07/01/25 at 10:01 AM but not signed at the time of the visit due to COVID social distancing. The plan was reviewed, and verbal consent was given.

## 2025-07-08 DIAGNOSIS — Z30.41 ENCOUNTER FOR SURVEILLANCE OF CONTRACEPTIVE PILLS: ICD-10-CM

## 2025-07-08 RX ORDER — LEVONORGESTREL AND ETHINYL ESTRADIOL 0.15-0.03
1 KIT ORAL DAILY
Qty: 84 TABLET | Refills: 0 | Status: SHIPPED | OUTPATIENT
Start: 2025-07-08

## 2025-08-06 ENCOUNTER — TELEPHONE (OUTPATIENT)
Dept: PSYCHIATRY | Facility: CLINIC | Age: 25
End: 2025-08-06

## 2025-08-06 ENCOUNTER — TELEMEDICINE (OUTPATIENT)
Dept: PSYCHIATRY | Facility: CLINIC | Age: 25
End: 2025-08-06
Payer: MEDICARE

## 2025-08-06 DIAGNOSIS — Z87.898 HISTORY OF PALPITATIONS: ICD-10-CM

## 2025-08-06 DIAGNOSIS — F41.1 GAD (GENERALIZED ANXIETY DISORDER): ICD-10-CM

## 2025-08-06 DIAGNOSIS — F41.0 PANIC ATTACKS: ICD-10-CM

## 2025-08-06 DIAGNOSIS — G47.00 INSOMNIA, UNSPECIFIED TYPE: ICD-10-CM

## 2025-08-06 DIAGNOSIS — F12.10 CANNABIS ABUSE, DAILY USE: ICD-10-CM

## 2025-08-06 DIAGNOSIS — F33.1 MDD (MAJOR DEPRESSIVE DISORDER), RECURRENT EPISODE, MODERATE (HCC): Primary | ICD-10-CM

## 2025-08-06 DIAGNOSIS — F33.1 MDD (MAJOR DEPRESSIVE DISORDER), RECURRENT EPISODE, MODERATE (HCC): ICD-10-CM

## 2025-08-06 PROCEDURE — 90833 PSYTX W PT W E/M 30 MIN: CPT | Performed by: PSYCHIATRY & NEUROLOGY

## 2025-08-06 PROCEDURE — 99214 OFFICE O/P EST MOD 30 MIN: CPT | Performed by: PSYCHIATRY & NEUROLOGY

## 2025-08-06 RX ORDER — ESCITALOPRAM OXALATE 10 MG/1
15 TABLET ORAL DAILY
Qty: 135 TABLET | Refills: 1 | Status: SHIPPED | OUTPATIENT
Start: 2025-08-06

## 2025-08-06 RX ORDER — TRAZODONE HYDROCHLORIDE 50 MG/1
50 TABLET ORAL
Qty: 90 TABLET | Refills: 1 | Status: SHIPPED | OUTPATIENT
Start: 2025-08-06

## 2025-08-06 RX ORDER — LORAZEPAM 1 MG/1
1 TABLET ORAL DAILY PRN
Qty: 30 TABLET | Refills: 0 | Status: SHIPPED | OUTPATIENT
Start: 2025-08-06

## 2025-08-06 RX ORDER — ARIPIPRAZOLE 5 MG/1
7.5 TABLET ORAL DAILY
Qty: 135 TABLET | Refills: 1 | Status: SHIPPED | OUTPATIENT
Start: 2025-08-06 | End: 2025-11-04

## 2025-08-06 RX ORDER — ARIPIPRAZOLE 5 MG/1
7.5 TABLET ORAL DAILY
Qty: 135 TABLET | Refills: 1 | OUTPATIENT
Start: 2025-08-06

## 2025-08-12 ENCOUNTER — ANNUAL EXAM (OUTPATIENT)
Dept: OBGYN CLINIC | Facility: CLINIC | Age: 25
End: 2025-08-12
Payer: MEDICARE

## (undated) DEVICE — GLOVE INDICATOR PI UNDERGLOVE SZ 7 BLUE

## (undated) DEVICE — STERILE 8 INCH PROCTO SWAB: Brand: CARDINAL HEALTH

## (undated) DEVICE — ELECTRODE BALL E-Z CLEAN 2IN -0015

## (undated) DEVICE — SMOKE EVACUATION TUBING WITH 7/8 IN TO 1/4 IN REDUCER: Brand: BUFFALO FILTER

## (undated) DEVICE — NEEDLE 25G X 1 1/2

## (undated) DEVICE — GLOVE PI ULTRA TOUCH SZ.6.5

## (undated) DEVICE — 1820 FOAM BLOCK NEEDLE COUNTER: Brand: DEVON

## (undated) DEVICE — STERILE LUBRICATING JELLY, TUBE: Brand: HR LUBRICATING JELLY

## (undated) DEVICE — DECANTER: Brand: UNBRANDED

## (undated) DEVICE — PVC URETHRAL CATHETER: Brand: DOVER

## (undated) DEVICE — CHLORHEXIDINE 4PCT 4 OZ

## (undated) DEVICE — STRL ALLENTOWN HYSTEROSCOPY PK: Brand: CARDINAL HEALTH

## (undated) DEVICE — PENCIL ELECTROSURG E-Z CLEAN -0035H

## (undated) DEVICE — SYRINGE 10ML LL

## (undated) DEVICE — PREMIUM DRY TRAY LF: Brand: MEDLINE INDUSTRIES, INC.

## (undated) DEVICE — SPONGE LAP 18 X 18 IN STRL RFD

## (undated) DEVICE — SPECIMEN CONTAINER STERILE PEEL PACK

## (undated) DEVICE — NEEDLE SPINAL 22G X 3.5IN  QUINCKE